# Patient Record
Sex: FEMALE | Race: WHITE | Employment: FULL TIME | URBAN - METROPOLITAN AREA
[De-identification: names, ages, dates, MRNs, and addresses within clinical notes are randomized per-mention and may not be internally consistent; named-entity substitution may affect disease eponyms.]

---

## 2017-07-24 ENCOUNTER — HOSPITAL ENCOUNTER (OUTPATIENT)
Dept: MAMMOGRAPHY | Age: 57
Discharge: HOME | End: 2017-07-24

## 2017-07-24 DIAGNOSIS — R92.8 ABNORMAL MAMMOGRAM: ICD-10-CM

## 2017-07-24 DIAGNOSIS — Z12.31 VISIT FOR SCREENING MAMMOGRAM: ICD-10-CM

## 2018-11-16 ENCOUNTER — HOSPITAL ENCOUNTER (OUTPATIENT)
Dept: MAMMOGRAPHY | Age: 58
Discharge: HOME | End: 2018-11-16
Payer: COMMERCIAL

## 2018-11-16 ENCOUNTER — HOSPITAL ENCOUNTER (OUTPATIENT)
Dept: ULTRASOUND IMAGING | Age: 58
Discharge: HOME | End: 2018-11-16
Payer: COMMERCIAL

## 2018-11-16 DIAGNOSIS — Z12.31 VISIT FOR SCREENING MAMMOGRAM: ICD-10-CM

## 2018-11-16 DIAGNOSIS — R92.8 ABNORMAL MAMMOGRAM: ICD-10-CM

## 2018-11-16 PROCEDURE — 77067 SCR MAMMO BI INCL CAD: CPT

## 2018-11-16 PROCEDURE — 76642 ULTRASOUND BREAST LIMITED: CPT

## 2018-11-19 ENCOUNTER — PRE-PROCEDURE TELEPHONE (OUTPATIENT)
Dept: WOMENS IMAGING | Age: 58
End: 2018-11-19

## 2018-11-19 RX ORDER — LORAZEPAM 1 MG/1
1 TABLET ORAL
COMMUNITY
Start: 2018-10-02 | End: 2018-12-31

## 2018-11-19 RX ORDER — ZOLPIDEM TARTRATE 12.5 MG/1
TABLET, FILM COATED, EXTENDED RELEASE ORAL
COMMUNITY
Start: 2018-09-21 | End: 2018-12-20

## 2018-11-19 RX ORDER — DEXLANSOPRAZOLE 60 MG/1
60 CAPSULE, DELAYED RELEASE ORAL
COMMUNITY
End: 2022-02-25

## 2018-11-19 RX ORDER — CIPROFLOXACIN 250 MG/1
250 TABLET, FILM COATED ORAL
COMMUNITY
Start: 2018-11-05 | End: 2019-08-06 | Stop reason: ALTCHOICE

## 2018-11-19 RX ORDER — IRBESARTAN 300 MG/1
300 TABLET ORAL
COMMUNITY
End: 2021-05-14

## 2018-12-04 ENCOUNTER — HOSPITAL ENCOUNTER (OUTPATIENT)
Dept: WOMENS IMAGING | Age: 58
Discharge: HOME | End: 2018-12-04
Payer: COMMERCIAL

## 2018-12-04 DIAGNOSIS — R92.8 ABNORMAL MAMMOGRAM: ICD-10-CM

## 2018-12-04 PROCEDURE — G0279 TOMOSYNTHESIS, MAMMO: HCPCS

## 2019-06-06 ENCOUNTER — HOSPITAL ENCOUNTER (OUTPATIENT)
Dept: WOMENS IMAGING | Age: 59
Discharge: HOME | End: 2019-06-06
Payer: COMMERCIAL

## 2019-06-06 DIAGNOSIS — R92.8 ABNORMAL MAMMOGRAM: ICD-10-CM

## 2019-06-06 PROCEDURE — G0279 TOMOSYNTHESIS, MAMMO: HCPCS

## 2019-07-02 ENCOUNTER — HOSPITAL ENCOUNTER (OUTPATIENT)
Age: 59
Setting detail: OUTPATIENT SURGERY
Discharge: HOME | End: 2019-07-02
Attending: INTERNAL MEDICINE | Admitting: INTERNAL MEDICINE
Payer: COMMERCIAL

## 2019-07-02 ENCOUNTER — ANESTHESIA EVENT (OUTPATIENT)
Dept: ENDOSCOPY | Age: 59
End: 2019-07-02
Payer: COMMERCIAL

## 2019-07-02 ENCOUNTER — ANESTHESIA (OUTPATIENT)
Dept: ENDOSCOPY | Age: 59
End: 2019-07-02
Payer: COMMERCIAL

## 2019-07-02 VITALS — SYSTOLIC BLOOD PRESSURE: 112 MMHG | DIASTOLIC BLOOD PRESSURE: 75 MMHG | OXYGEN SATURATION: 94 %

## 2019-07-02 VITALS
HEART RATE: 45 BPM | DIASTOLIC BLOOD PRESSURE: 74 MMHG | HEIGHT: 70 IN | WEIGHT: 200 LBS | OXYGEN SATURATION: 96 % | BODY MASS INDEX: 28.63 KG/M2 | RESPIRATION RATE: 20 BRPM | TEMPERATURE: 97.3 F | SYSTOLIC BLOOD PRESSURE: 141 MMHG

## 2019-07-02 PROCEDURE — 7100000010 HC PHASE II RECOVERY - FIRST 15 MIN: Performed by: INTERNAL MEDICINE

## 2019-07-02 PROCEDURE — 3609010300 HC COLONOSCOPY W/BIOPSY SINGLE/MULTIPLE: Performed by: INTERNAL MEDICINE

## 2019-07-02 PROCEDURE — 2709999900 HC NON-CHARGEABLE SUPPLY: Performed by: INTERNAL MEDICINE

## 2019-07-02 PROCEDURE — 7100000011 HC PHASE II RECOVERY - ADDTL 15 MIN: Performed by: INTERNAL MEDICINE

## 2019-07-02 PROCEDURE — 3609010600 HC COLONOSCOPY POLYPECTOMY SNARE/COLD BIOPSY: Performed by: INTERNAL MEDICINE

## 2019-07-02 PROCEDURE — 88305 TISSUE EXAM BY PATHOLOGIST: CPT

## 2019-07-02 PROCEDURE — 3700000001 HC ADD 15 MINUTES (ANESTHESIA): Performed by: INTERNAL MEDICINE

## 2019-07-02 PROCEDURE — 3700000000 HC ANESTHESIA ATTENDED CARE: Performed by: INTERNAL MEDICINE

## 2019-07-02 PROCEDURE — 6370000000 HC RX 637 (ALT 250 FOR IP): Performed by: ANESTHESIOLOGY

## 2019-07-02 PROCEDURE — 2500000003 HC RX 250 WO HCPCS: Performed by: NURSE ANESTHETIST, CERTIFIED REGISTERED

## 2019-07-02 PROCEDURE — 2580000003 HC RX 258: Performed by: NURSE ANESTHETIST, CERTIFIED REGISTERED

## 2019-07-02 PROCEDURE — 6360000002 HC RX W HCPCS: Performed by: NURSE ANESTHETIST, CERTIFIED REGISTERED

## 2019-07-02 RX ORDER — LIDOCAINE HYDROCHLORIDE 20 MG/ML
INJECTION, SOLUTION EPIDURAL; INFILTRATION; INTRACAUDAL; PERINEURAL PRN
Status: DISCONTINUED | OUTPATIENT
Start: 2019-07-02 | End: 2019-07-02 | Stop reason: SDUPTHER

## 2019-07-02 RX ORDER — SODIUM CHLORIDE, SODIUM LACTATE, POTASSIUM CHLORIDE, CALCIUM CHLORIDE 600; 310; 30; 20 MG/100ML; MG/100ML; MG/100ML; MG/100ML
INJECTION, SOLUTION INTRAVENOUS CONTINUOUS PRN
Status: DISCONTINUED | OUTPATIENT
Start: 2019-07-02 | End: 2019-07-02 | Stop reason: SDUPTHER

## 2019-07-02 RX ORDER — ZOLPIDEM TARTRATE 10 MG/1
12.5 TABLET ORAL NIGHTLY PRN
COMMUNITY
End: 2021-05-14 | Stop reason: CLARIF

## 2019-07-02 RX ORDER — PROPOFOL 10 MG/ML
INJECTION, EMULSION INTRAVENOUS PRN
Status: DISCONTINUED | OUTPATIENT
Start: 2019-07-02 | End: 2019-07-02 | Stop reason: SDUPTHER

## 2019-07-02 RX ORDER — ALPRAZOLAM 1 MG/1
1 TABLET ORAL NIGHTLY PRN
COMMUNITY
End: 2021-05-14 | Stop reason: ALTCHOICE

## 2019-07-02 RX ORDER — LORAZEPAM 1 MG/1
2 TABLET ORAL NIGHTLY
COMMUNITY

## 2019-07-02 RX ORDER — SCOLOPAMINE TRANSDERMAL SYSTEM 1 MG/1
1 PATCH, EXTENDED RELEASE TRANSDERMAL ONCE
Status: DISCONTINUED | OUTPATIENT
Start: 2019-07-02 | End: 2019-07-02 | Stop reason: HOSPADM

## 2019-07-02 RX ADMIN — PROPOFOL 25 MG: 10 INJECTION, EMULSION INTRAVENOUS at 08:55

## 2019-07-02 RX ADMIN — PROPOFOL 75 MG: 10 INJECTION, EMULSION INTRAVENOUS at 08:29

## 2019-07-02 RX ADMIN — PROPOFOL 20 MG: 10 INJECTION, EMULSION INTRAVENOUS at 09:02

## 2019-07-02 RX ADMIN — PROPOFOL 25 MG: 10 INJECTION, EMULSION INTRAVENOUS at 08:58

## 2019-07-02 RX ADMIN — PROPOFOL 25 MG: 10 INJECTION, EMULSION INTRAVENOUS at 08:46

## 2019-07-02 RX ADMIN — PROPOFOL 75 MG: 10 INJECTION, EMULSION INTRAVENOUS at 08:30

## 2019-07-02 RX ADMIN — PROPOFOL 50 MG: 10 INJECTION, EMULSION INTRAVENOUS at 08:28

## 2019-07-02 RX ADMIN — SODIUM CHLORIDE, SODIUM LACTATE, POTASSIUM CHLORIDE, AND CALCIUM CHLORIDE: 600; 310; 30; 20 INJECTION, SOLUTION INTRAVENOUS at 08:23

## 2019-07-02 RX ADMIN — PROPOFOL 50 MG: 10 INJECTION, EMULSION INTRAVENOUS at 08:37

## 2019-07-02 RX ADMIN — PROPOFOL 100 MG: 10 INJECTION, EMULSION INTRAVENOUS at 08:32

## 2019-07-02 RX ADMIN — PROPOFOL 50 MG: 10 INJECTION, EMULSION INTRAVENOUS at 08:40

## 2019-07-02 RX ADMIN — PROPOFOL 10 MG: 10 INJECTION, EMULSION INTRAVENOUS at 09:00

## 2019-07-02 RX ADMIN — PROPOFOL 25 MG: 10 INJECTION, EMULSION INTRAVENOUS at 08:50

## 2019-07-02 RX ADMIN — PROPOFOL 25 MG: 10 INJECTION, EMULSION INTRAVENOUS at 08:42

## 2019-07-02 RX ADMIN — PROPOFOL 50 MG: 10 INJECTION, EMULSION INTRAVENOUS at 08:34

## 2019-07-02 RX ADMIN — LIDOCAINE HYDROCHLORIDE 60 MG: 20 INJECTION, SOLUTION EPIDURAL; INFILTRATION; INTRACAUDAL; PERINEURAL at 08:28

## 2019-07-02 RX ADMIN — PROPOFOL 25 MG: 10 INJECTION, EMULSION INTRAVENOUS at 08:52

## 2019-07-02 RX ADMIN — PROPOFOL 50 MG: 10 INJECTION, EMULSION INTRAVENOUS at 08:35

## 2019-07-02 ASSESSMENT — PULMONARY FUNCTION TESTS
PIF_VALUE: 0
PIF_VALUE: 1
PIF_VALUE: 0

## 2019-07-02 ASSESSMENT — PAIN DESCRIPTION - LOCATION: LOCATION: ABDOMEN

## 2019-07-02 ASSESSMENT — PAIN - FUNCTIONAL ASSESSMENT
PAIN_FUNCTIONAL_ASSESSMENT: 0-10
PAIN_FUNCTIONAL_ASSESSMENT: FACES
PAIN_FUNCTIONAL_ASSESSMENT: 0-10
PAIN_FUNCTIONAL_ASSESSMENT: 0-10

## 2019-07-02 ASSESSMENT — PAIN SCALES - GENERAL: PAINLEVEL_OUTOF10: 3

## 2019-07-02 ASSESSMENT — PAIN DESCRIPTION - DESCRIPTORS: DESCRIPTORS: PRESSURE

## 2019-07-02 ASSESSMENT — PAIN DESCRIPTION - ORIENTATION: ORIENTATION: UPPER

## 2019-07-02 NOTE — H&P
[address] [place] [address]   Pre-operative History and Physical    Patient: Aby Kinney  : 1960     History Obtained From:  patient, electronic medical record    HISTORY OF PRESENT ILLNESS:    The patient is a 60 y.o. female who presents for a colonoscopy for screening for cancer/surveillance of polyps. Past Medical History:    Cervical dystonia  IBS  Anxiety  GERD  Rosacea  Anemia  GERD  Fatt liver  EPI  H/o polyp  Past Surgical History:    Breast surgery  Medications Prior to Admission:   No current facility-administered medications on file prior to encounter. Current Outpatient Medications on File Prior to Encounter   Medication Sig Dispense Refill    Evolocumab (REPATHA SC) Inject into the skin      LORazepam (ATIVAN) 1 MG tablet Take 1 mg by mouth every 6 hours as needed for Anxiety.  zolpidem (AMBIEN) 10 MG tablet Take 12.5 mg by mouth nightly as needed for Sleep.  ALPRAZolam (XANAX) 1 MG tablet Take 1 mg by mouth nightly as needed for Sleep.  dexlansoprazole (DEXILANT) 60 MG CPDR delayed release capsule Take 60 mg by mouth      irbesartan (AVAPRO) 300 MG tablet Take 300 mg by mouth      ciprofloxacin (CIPRO) 250 MG tablet Take 250 mg by mouth       Allergies:  Atorvastatin; Iodine; Pravastatin; Rosuvastatin; Shellfish allergy; and Sulfa antibiotics    History of allergic reaction to anesthesia:  No    Social History:   TOBACCO:   has no tobacco history on file. ETOH:  +EtOH  DRUGS:   has no drug history on file. Family History:   History reviewed. No pertinent family history.     PHYSICAL EXAM:      BP (!) 121/96   Pulse 72   Temp 97.3 °F (36.3 °C)   Resp 16   Ht 5' 10\" (1.778 m)   Wt 200 lb (90.7 kg)   SpO2 97%   BMI 28.70 kg/m²  I        Heart:  No m/r/g +s1/s2 RRR    Lungs:  CTA bilaterally    Abdomen:  Soft, nontender, non distended; +bs    ASA Grade:  ASA 2 - Patient with mild systemic disease with no functional limitations    Mallampati Class:  Class

## 2019-07-02 NOTE — ANESTHESIA PRE PROCEDURE
Department of Anesthesiology  Preprocedure Note       Name:  Vivian Sherman   Age:  60 y.o.  :  1960                                          MRN:  [id number]         Date:  2019      Surgeon: Talita Leach):  Raul Shin MD    Procedure: COLONOSCOPY WITH MAC (N/A )    Medications prior to admission:   Prior to Admission medications    Medication Sig Start Date End Date Taking? Authorizing Provider   dexlansoprazole (DEXILANT) 60 MG CPDR delayed release capsule Take 60 mg by mouth    Historical Provider, MD   irbesartan (AVAPRO) 300 MG tablet Take 300 mg by mouth    Historical Provider, MD   ciprofloxacin (CIPRO) 250 MG tablet Take 250 mg by mouth 18   Historical Provider, MD       Current medications:    Current Facility-Administered Medications   Medication Dose Route Frequency Provider Last Rate Last Dose    scopolamine (TRANSDERM-SCOP) transdermal patch 1 patch  1 patch Transdermal Once Harriett Toth MD           Allergies: Allergies   Allergen Reactions    Atorvastatin     Iodine Swelling    Pravastatin     Rosuvastatin     Shellfish Allergy Itching    Sulfa Antibiotics Nausea Only       Problem List:  There is no problem list on file for this patient. Past Medical History:  History reviewed. No pertinent past medical history. Past Surgical History:  History reviewed. No pertinent surgical history.     Social History:    Social History     Tobacco Use    Smoking status: Not on file   Substance Use Topics    Alcohol use: Not on file                                Counseling given: Not Answered      Vital Signs (Current):   Vitals:    19 0718   BP: (!) 121/96   Pulse: 72   Resp: 16   Temp: 97.3 °F (36.3 °C)   SpO2: 97%   Weight: 200 lb (90.7 kg)   Height: 5' 10\" (1.778 m)                                              BP Readings from Last 3 Encounters:   19 (!) 121/96       NPO Status:

## 2019-07-02 NOTE — PROCEDURES
orifice the ileocecal valve and other normal anatomy and a picture was obtained. The scope was then withdrawn (>6minutes) with close inspection of the mucosa in a circumferential manor. TI normal.    Retroflexed views under the ICV and of right colon normal.     8 polyps 1 to 11mm removed with forceps and cold snare from throughout the colon. Mild diverticulosis throughout the colon. Retroflexion showed hemorrhoids. No immediate complications. The preparation was good. Estimated blood loss none    Impression:  8 polyps  Mild diverticulosis  hemorrhoids  Plan:  The patient is aware it is their responsibility to call for biopsy results in 7 days. Repeat colonoscopy in 2 years.   High fiber diet

## 2019-07-23 ENCOUNTER — FOLLOWUP TELEPHONE ENCOUNTER (OUTPATIENT)
Dept: WOMENS IMAGING | Age: 59
End: 2019-07-23

## 2019-07-24 ENCOUNTER — CLINICAL DOCUMENTATION (OUTPATIENT)
Dept: INTERVENTIONAL RADIOLOGY/VASCULAR | Age: 59
End: 2019-07-24

## 2019-07-24 NOTE — PROGRESS NOTES
Spoke to patient to schedule Liver biopsy requested with Dr. Uma Alfaro.  Patient scheduled for Aug 6th at 9 am.

## 2019-08-01 ENCOUNTER — FOLLOWUP TELEPHONE ENCOUNTER (OUTPATIENT)
Dept: WOMENS IMAGING | Age: 59
End: 2019-08-01

## 2019-08-06 ENCOUNTER — ANESTHESIA EVENT (OUTPATIENT)
Dept: CT IMAGING | Age: 59
End: 2019-08-06

## 2019-08-06 ENCOUNTER — HOSPITAL ENCOUNTER (OUTPATIENT)
Dept: CT IMAGING | Age: 59
Discharge: HOME | End: 2019-08-06
Payer: COMMERCIAL

## 2019-08-06 ENCOUNTER — ANESTHESIA (OUTPATIENT)
Dept: CT IMAGING | Age: 59
End: 2019-08-06

## 2019-08-06 VITALS
TEMPERATURE: 97.4 F | SYSTOLIC BLOOD PRESSURE: 159 MMHG | DIASTOLIC BLOOD PRESSURE: 84 MMHG | HEART RATE: 52 BPM | RESPIRATION RATE: 16 BRPM | OXYGEN SATURATION: 96 % | WEIGHT: 210 LBS | HEIGHT: 70 IN | BODY MASS INDEX: 30.06 KG/M2

## 2019-08-06 DIAGNOSIS — K76.0 FATTY METAMORPHOSIS OF LIVER: ICD-10-CM

## 2019-08-06 LAB
BASOPHILS ABSOLUTE: 0.1 K/UL (ref 0–0.2)
BASOPHILS RELATIVE PERCENT: 1.2 %
EOSINOPHILS ABSOLUTE: 0.1 K/UL (ref 0–0.6)
EOSINOPHILS RELATIVE PERCENT: 1.2 %
GLUCOSE BLD-MCNC: 86 MG/DL (ref 70–99)
HCT VFR BLD CALC: 36.8 % (ref 36–48)
HEMOGLOBIN: 12.1 G/DL (ref 12–16)
INR BLD: 1.03 (ref 0.86–1.14)
LYMPHOCYTES ABSOLUTE: 1.4 K/UL (ref 1–5.1)
LYMPHOCYTES RELATIVE PERCENT: 31.5 %
MCH RBC QN AUTO: 29.4 PG (ref 26–34)
MCHC RBC AUTO-ENTMCNC: 33 G/DL (ref 31–36)
MCV RBC AUTO: 89 FL (ref 80–100)
MONOCYTES ABSOLUTE: 0.4 K/UL (ref 0–1.3)
MONOCYTES RELATIVE PERCENT: 9.5 %
NEUTROPHILS ABSOLUTE: 2.4 K/UL (ref 1.7–7.7)
NEUTROPHILS RELATIVE PERCENT: 56.6 %
PDW BLD-RTO: 15.7 % (ref 12.4–15.4)
PERFORMED ON: NORMAL
PLATELET # BLD: 166 K/UL (ref 135–450)
PMV BLD AUTO: 8.6 FL (ref 5–10.5)
PROTHROMBIN TIME: 11.7 SEC (ref 9.8–13)
RBC # BLD: 4.13 M/UL (ref 4–5.2)
WBC # BLD: 4.3 K/UL (ref 4–11)

## 2019-08-06 PROCEDURE — 7100000000 HC PACU RECOVERY - FIRST 15 MIN

## 2019-08-06 PROCEDURE — 2709999900 CT NEEDLE BIOPSY LIVER PERCUTANEOUS

## 2019-08-06 PROCEDURE — 7100000011 HC PHASE II RECOVERY - ADDTL 15 MIN

## 2019-08-06 PROCEDURE — 88307 TISSUE EXAM BY PATHOLOGIST: CPT

## 2019-08-06 PROCEDURE — 6360000002 HC RX W HCPCS: Performed by: ANESTHESIOLOGY

## 2019-08-06 PROCEDURE — 88313 SPECIAL STAINS GROUP 2: CPT

## 2019-08-06 PROCEDURE — 7100000010 HC PHASE II RECOVERY - FIRST 15 MIN

## 2019-08-06 PROCEDURE — 85025 COMPLETE CBC W/AUTO DIFF WBC: CPT

## 2019-08-06 PROCEDURE — 85610 PROTHROMBIN TIME: CPT

## 2019-08-06 PROCEDURE — 7100000001 HC PACU RECOVERY - ADDTL 15 MIN

## 2019-08-06 PROCEDURE — 36415 COLL VENOUS BLD VENIPUNCTURE: CPT

## 2019-08-06 RX ORDER — FENTANYL CITRATE 50 UG/ML
50 INJECTION, SOLUTION INTRAMUSCULAR; INTRAVENOUS EVERY 5 MIN PRN
Status: DISCONTINUED | OUTPATIENT
Start: 2019-08-06 | End: 2019-08-07 | Stop reason: HOSPADM

## 2019-08-06 RX ORDER — ONDANSETRON 2 MG/ML
4 INJECTION INTRAMUSCULAR; INTRAVENOUS EVERY 8 HOURS PRN
Status: DISCONTINUED | OUTPATIENT
Start: 2019-08-06 | End: 2019-08-07 | Stop reason: HOSPADM

## 2019-08-06 RX ORDER — PROMETHAZINE HYDROCHLORIDE 25 MG/ML
6.25 INJECTION, SOLUTION INTRAMUSCULAR; INTRAVENOUS
Status: ACTIVE | OUTPATIENT
Start: 2019-08-06 | End: 2019-08-06

## 2019-08-06 RX ORDER — ONDANSETRON 2 MG/ML
4 INJECTION INTRAMUSCULAR; INTRAVENOUS
Status: COMPLETED | OUTPATIENT
Start: 2019-08-06 | End: 2019-08-06

## 2019-08-06 RX ORDER — SODIUM CHLORIDE, SODIUM LACTATE, POTASSIUM CHLORIDE, CALCIUM CHLORIDE 600; 310; 30; 20 MG/100ML; MG/100ML; MG/100ML; MG/100ML
INJECTION, SOLUTION INTRAVENOUS CONTINUOUS
Status: DISCONTINUED | OUTPATIENT
Start: 2019-08-06 | End: 2019-08-07 | Stop reason: HOSPADM

## 2019-08-06 RX ORDER — FENTANYL CITRATE 50 UG/ML
25 INJECTION, SOLUTION INTRAMUSCULAR; INTRAVENOUS EVERY 5 MIN PRN
Status: DISCONTINUED | OUTPATIENT
Start: 2019-08-06 | End: 2019-08-07 | Stop reason: HOSPADM

## 2019-08-06 RX ADMIN — HYDROMORPHONE HYDROCHLORIDE 0.25 MG: 1 INJECTION, SOLUTION INTRAMUSCULAR; INTRAVENOUS; SUBCUTANEOUS at 12:18

## 2019-08-06 RX ADMIN — ONDANSETRON 4 MG: 2 INJECTION INTRAMUSCULAR; INTRAVENOUS at 14:02

## 2019-08-06 RX ADMIN — HYDROMORPHONE HYDROCHLORIDE 0.25 MG: 1 INJECTION, SOLUTION INTRAMUSCULAR; INTRAVENOUS; SUBCUTANEOUS at 12:08

## 2019-08-06 RX ADMIN — HYDROMORPHONE HYDROCHLORIDE 0.5 MG: 1 INJECTION, SOLUTION INTRAMUSCULAR; INTRAVENOUS; SUBCUTANEOUS at 12:46

## 2019-08-06 ASSESSMENT — PAIN SCALES - GENERAL
PAINLEVEL_OUTOF10: 0
PAINLEVEL_OUTOF10: 2
PAINLEVEL_OUTOF10: 4
PAINLEVEL_OUTOF10: 7
PAINLEVEL_OUTOF10: 4
PAINLEVEL_OUTOF10: 2

## 2019-08-06 ASSESSMENT — PAIN - FUNCTIONAL ASSESSMENT
PAIN_FUNCTIONAL_ASSESSMENT: 0-10
PAIN_FUNCTIONAL_ASSESSMENT: 0-10

## 2019-08-06 ASSESSMENT — PAIN DESCRIPTION - DESCRIPTORS: DESCRIPTORS: SORE

## 2019-08-08 NOTE — ANESTHESIA POSTPROCEDURE EVALUATION
Department of Anesthesiology  Postprocedure Note    Patient: Melina Arreaga  MRN: [id number]  YOB: 1960  Date of evaluation: 8/8/2019  Time:  9:09 AM     Procedure Summary     Date:  08/06/19 Room / Location:  [facility] CT Scan; [facility] Radiology    Anesthesia Start:  21  Anesthesia Stop:  1140    Procedure:  CT NEEDLE BIOPSY LIVER PERCUTANEOUS Diagnosis:  Fatty metamorphosis of liver    Scheduled Providers:  [address] [place] [address] Radiologist Responsible Provider:      Anesthesia Type:  MAC ASA Status:  3          Anesthesia Type: MAC    Matthieu Phase I: Matthieu Score: 9    Matthieu Phase II: Matthieu Score: 10    Last vitals: Reviewed and per EMR flowsheets.        Anesthesia Post Evaluation    Patient location during evaluation: PACU  Patient participation: complete - patient participated  Level of consciousness: awake and alert  Airway patency: patent  Nausea & Vomiting: no nausea and no vomiting  Complications: no  Cardiovascular status: blood pressure returned to baseline  Respiratory status: acceptable  Hydration status: stable

## 2019-10-10 ENCOUNTER — APPOINTMENT (RX ONLY)
Dept: URBAN - METROPOLITAN AREA CLINIC 170 | Facility: CLINIC | Age: 59
Setting detail: DERMATOLOGY
End: 2019-10-10

## 2019-10-10 DIAGNOSIS — L71.8 OTHER ROSACEA: ICD-10-CM

## 2019-10-10 DIAGNOSIS — L82.1 OTHER SEBORRHEIC KERATOSIS: ICD-10-CM

## 2019-10-10 DIAGNOSIS — L81.4 OTHER MELANIN HYPERPIGMENTATION: ICD-10-CM

## 2019-10-10 DIAGNOSIS — L73.8 OTHER SPECIFIED FOLLICULAR DISORDERS: ICD-10-CM

## 2019-10-10 DIAGNOSIS — T78.40 ALLERGY, UNSPECIFIED: ICD-10-CM

## 2019-10-10 DIAGNOSIS — D22 MELANOCYTIC NEVI: ICD-10-CM

## 2019-10-10 DIAGNOSIS — L64.8 OTHER ANDROGENIC ALOPECIA: ICD-10-CM

## 2019-10-10 DIAGNOSIS — D18.0 HEMANGIOMA: ICD-10-CM

## 2019-10-10 DIAGNOSIS — L57.0 ACTINIC KERATOSIS: ICD-10-CM

## 2019-10-10 PROBLEM — D18.01 HEMANGIOMA OF SKIN AND SUBCUTANEOUS TISSUE: Status: ACTIVE | Noted: 2019-10-10

## 2019-10-10 PROBLEM — D22.5 MELANOCYTIC NEVI OF TRUNK: Status: ACTIVE | Noted: 2019-10-10

## 2019-10-10 PROBLEM — D48.5 NEOPLASM OF UNCERTAIN BEHAVIOR OF SKIN: Status: ACTIVE | Noted: 2019-10-10

## 2019-10-10 PROBLEM — T78.40XA ALLERGY, UNSPECIFIED, INITIAL ENCOUNTER: Status: ACTIVE | Noted: 2019-10-10

## 2019-10-10 PROBLEM — D89.9 DISORDER INVOLVING THE IMMUNE MECHANISM, UNSPECIFIED: Status: ACTIVE | Noted: 2019-10-10

## 2019-10-10 PROCEDURE — ? FULL BODY SKIN EXAM

## 2019-10-10 PROCEDURE — ? BIOPSY BY SHAVE METHOD

## 2019-10-10 PROCEDURE — 11103 TANGNTL BX SKIN EA SEP/ADDL: CPT

## 2019-10-10 PROCEDURE — ? COUNSELING

## 2019-10-10 PROCEDURE — ? PRESCRIPTION MEDICATION MANAGEMENT

## 2019-10-10 PROCEDURE — ? ORDER TESTS

## 2019-10-10 PROCEDURE — ? PRESCRIPTION SAMPLES PROVIDED

## 2019-10-10 PROCEDURE — ? ADDITIONAL NOTES

## 2019-10-10 PROCEDURE — 11102 TANGNTL BX SKIN SINGLE LES: CPT

## 2019-10-10 PROCEDURE — 99203 OFFICE O/P NEW LOW 30 MIN: CPT | Mod: 25

## 2019-10-10 PROCEDURE — ? PRESCRIPTION

## 2019-10-10 PROCEDURE — ? EDUCATIONAL RESOURCES PROVIDED

## 2019-10-10 RX ORDER — FLUOROURACIL 5 MG/G
CREAM TOPICAL
Qty: 1 | Refills: 2 | Status: ERX | COMMUNITY
Start: 2019-10-10

## 2019-10-10 RX ADMIN — FLUOROURACIL: 5 CREAM TOPICAL at 00:00

## 2019-10-10 ASSESSMENT — LOCATION SIMPLE DESCRIPTION DERM
LOCATION SIMPLE: CHEST
LOCATION SIMPLE: LEFT TEMPLE
LOCATION SIMPLE: LEFT CHEEK
LOCATION SIMPLE: LEFT ZYGOMA
LOCATION SIMPLE: RIGHT TEMPLE
LOCATION SIMPLE: RIGHT CHEEK
LOCATION SIMPLE: LEFT BREAST
LOCATION SIMPLE: LEFT LIP

## 2019-10-10 ASSESSMENT — LOCATION DETAILED DESCRIPTION DERM
LOCATION DETAILED: LEFT MEDIAL BREAST 10-11:00 REGION
LOCATION DETAILED: RIGHT INFERIOR LATERAL MALAR CHEEK
LOCATION DETAILED: LEFT INFERIOR MEDIAL MALAR CHEEK
LOCATION DETAILED: LEFT INFERIOR VERMILION LIP
LOCATION DETAILED: LEFT LATERAL BUCCAL CHEEK
LOCATION DETAILED: LEFT CENTRAL ZYGOMA
LOCATION DETAILED: LEFT MID TEMPLE
LOCATION DETAILED: RIGHT LATERAL SUPERIOR CHEST
LOCATION DETAILED: STERNAL NOTCH
LOCATION DETAILED: RIGHT CENTRAL TEMPLE
LOCATION DETAILED: MIDDLE STERNUM

## 2019-10-10 ASSESSMENT — LOCATION ZONE DERM
LOCATION ZONE: FACE
LOCATION ZONE: FACE
LOCATION ZONE: TRUNK
LOCATION ZONE: LIP

## 2019-10-10 NOTE — PROCEDURE: MIPS QUALITY
Detail Level: Detailed
Quality 226: Preventive Care And Screening: Tobacco Use: Screening And Cessation Intervention: Patient screened for tobacco use and is an ex/non-smoker
Quality 431: Preventive Care And Screening: Unhealthy Alcohol Use - Screening: Patient screened for unhealthy alcohol use using a single question and scores 2 or greater episodes per year and brief intervention did not occur
Quality 110: Preventive Care And Screening: Influenza Immunization: Influenza immunization was not ordered or administered, reason not given
Quality 130: Documentation Of Current Medications In The Medical Record: Current Medications Documented

## 2019-10-10 NOTE — PROCEDURE: ADDITIONAL NOTES
Additional Notes: Hairloss questionairre given to patient. Will bring back at next appointment.
Detail Level: Simple

## 2019-10-10 NOTE — PROCEDURE: PRESCRIPTION MEDICATION MANAGEMENT
Otc Regimen: Zyrtec, claritin, or allegra. If continues then  follow up with allergist
Render In Strict Bullet Format?: No
Detail Level: Zone

## 2019-10-10 NOTE — HPI: EVALUATION OF SKIN LESION(S)
What Type Of Note Output Would You Prefer (Optional)?: Bullet Format
Hpi Title: Evaluation of Skin Lesions
How Severe Are Your Spot(S)?: mild
Have Your Spot(S) Been Treated In The Past?: has not been treated
Family Member: Sister
Additional History: Left cheek yellow indent spot.  Multiple Small yellow bumps on face. Pimple type growth on left lower eyelid.

## 2019-10-10 NOTE — PROCEDURE: BIOPSY BY SHAVE METHOD
Electrodesiccation Text: The wound bed was treated with electrodesiccation after the biopsy was performed.
X Size Of Lesion In Cm: 0.9
Biopsy Type: H and E
Wound Care: Aquaphor
Consent: Written consent was obtained and risks were reviewed including but not limited to scarring, infection, bleeding, scabbing, incomplete removal, nerve damage and allergy to anesthesia.
Bill For Surgical Tray: no
Depth Of Biopsy: dermis
Notification Instructions: Patient will be notified of biopsy results. However, patient instructed to call the office if not contacted within 2 weeks.
Dressing: Band-Aid
Type Of Destruction Used: Curettage
Anesthesia Volume In Cc (Will Not Render If 0): 2
Electrodesiccation And Curettage Text: The wound bed was treated with electrodesiccation and curettage after the biopsy was performed.
Detail Level: Detailed
Biopsy Method: double edge Personna blade
Hemostasis: Aluminum Chloride
Curettage Text: The wound bed was treated with curettage after the biopsy was performed.
Billing Type: Third-Party Bill
Silver Nitrate Text: The wound bed was treated with silver nitrate after the biopsy was performed.
Was A Bandage Applied: Yes
Anticipated Plan (Based On Presumed Biopsy Results): Call either way
Anesthesia Type: 1% Xylocaine with epinephrine
Cryotherapy Text: The wound bed was treated with cryotherapy after the biopsy was performed.
Additional Anesthesia Volume In Cc (Will Not Render If 0): 0
Size Of Lesion In Cm: 0.6
Lab Facility: [id number]
Lab: -[id number]
Additional Anticipated Plans: If malignant consider Mohs surgery
Post-Care Instructions: I reviewed with the patient in detail post-care instructions. Patient is to keep the biopsy site dry overnight, and then apply bacitracin twice daily until healed. Patient may apply hydrogen peroxide soaks to remove any crusting.
X Size Of Lesion In Cm: 0.7
Additional Anticipated Plans: If malignant consider excision
Size Of Lesion In Cm: 1.9

## 2019-10-10 NOTE — HPI: OTHER
Condition:: Lip swelling
Please Describe Your Condition:: After eating popcorn. Used hydrocortisone and aquaphor and took benadryl 3 times and it helped some.

## 2019-12-03 ENCOUNTER — HOSPITAL ENCOUNTER (OUTPATIENT)
Dept: WOMENS IMAGING | Age: 59
Discharge: HOME | End: 2019-12-03
Payer: COMMERCIAL

## 2019-12-03 DIAGNOSIS — Z12.31 BREAST CANCER SCREENING BY MAMMOGRAM: ICD-10-CM

## 2019-12-03 PROCEDURE — 77063 BREAST TOMOSYNTHESIS BI: CPT

## 2019-12-10 ENCOUNTER — RX ONLY (OUTPATIENT)
Age: 59
Setting detail: RX ONLY
End: 2019-12-10

## 2019-12-10 RX ORDER — IVERMECTIN 10 MG/G
CREAM TOPICAL
Qty: 1 | Refills: 3 | Status: ERX | COMMUNITY
Start: 2019-12-10

## 2019-12-10 RX ORDER — OXYMETAZOLINE HYDROCHLORIDE 10 MG/G
CREAM TOPICAL
Qty: 1 | Refills: 3 | Status: ERX | COMMUNITY
Start: 2019-12-10

## 2020-03-16 ENCOUNTER — APPOINTMENT (RX ONLY)
Dept: URBAN - METROPOLITAN AREA CLINIC 170 | Facility: CLINIC | Age: 60
Setting detail: DERMATOLOGY
End: 2020-03-16

## 2020-03-16 DIAGNOSIS — Z85.828 PERSONAL HISTORY OF OTHER MALIGNANT NEOPLASM OF SKIN: ICD-10-CM

## 2020-03-16 DIAGNOSIS — L57.0 ACTINIC KERATOSIS: ICD-10-CM

## 2020-03-16 PROCEDURE — ? PRESCRIPTION

## 2020-03-16 PROCEDURE — 99213 OFFICE O/P EST LOW 20 MIN: CPT

## 2020-03-16 PROCEDURE — ? PRESCRIPTION MEDICATION MANAGEMENT

## 2020-03-16 PROCEDURE — ? COUNSELING

## 2020-03-16 RX ORDER — LIDOCAINE AND PRILOCAINE 25; 25 MG/G; MG/G
CREAM TOPICAL
Qty: 1 | Refills: 1 | Status: ERX | COMMUNITY
Start: 2020-03-16

## 2020-03-16 RX ADMIN — LIDOCAINE AND PRILOCAINE: 25; 25 CREAM TOPICAL at 00:00

## 2020-03-16 ASSESSMENT — LOCATION SIMPLE DESCRIPTION DERM: LOCATION SIMPLE: LEFT CHEEK

## 2020-03-16 ASSESSMENT — LOCATION DETAILED DESCRIPTION DERM: LOCATION DETAILED: LEFT INFERIOR MEDIAL MALAR CHEEK

## 2020-03-16 ASSESSMENT — LOCATION ZONE DERM: LOCATION ZONE: FACE

## 2020-03-16 NOTE — PROCEDURE: MIPS QUALITY
Detail Level: Detailed
Quality 226: Preventive Care And Screening: Tobacco Use: Screening And Cessation Intervention: Patient screened for tobacco use and is an ex/non-smoker
Quality 130: Documentation Of Current Medications In The Medical Record: Current Medications with Name, Dosage, Frequency, or Route not Documented, Reason not Given
Quality 431: Preventive Care And Screening: Unhealthy Alcohol Use - Screening: Patient screened for unhealthy alcohol use using a single question and scores less than 2 times per year

## 2020-03-16 NOTE — PROCEDURE: PRESCRIPTION MEDICATION MANAGEMENT
Samples Given: Cetaphil cream
Discontinue Regimen: Fluorouracil cream 5%
Render In Strict Bullet Format?: No
Detail Level: Zone

## 2020-05-21 ENCOUNTER — OFFICE VISIT (OUTPATIENT)
Dept: PRIMARY CARE CLINIC | Age: 60
End: 2020-05-21
Payer: COMMERCIAL

## 2020-05-21 PROCEDURE — 99213 OFFICE O/P EST LOW 20 MIN: CPT | Performed by: NURSE PRACTITIONER

## 2020-05-24 LAB
SARS-COV-2: NOT DETECTED
SOURCE: NORMAL

## 2020-10-22 ENCOUNTER — APPOINTMENT (RX ONLY)
Dept: URBAN - METROPOLITAN AREA CLINIC 170 | Facility: CLINIC | Age: 60
Setting detail: DERMATOLOGY
End: 2020-10-22

## 2020-10-22 DIAGNOSIS — L57.0 ACTINIC KERATOSIS: ICD-10-CM | Status: RESOLVED

## 2020-10-22 DIAGNOSIS — Z85.828 PERSONAL HISTORY OF OTHER MALIGNANT NEOPLASM OF SKIN: ICD-10-CM

## 2020-10-22 DIAGNOSIS — L81.4 OTHER MELANIN HYPERPIGMENTATION: ICD-10-CM

## 2020-10-22 DIAGNOSIS — D18.0 HEMANGIOMA: ICD-10-CM

## 2020-10-22 DIAGNOSIS — L82.1 OTHER SEBORRHEIC KERATOSIS: ICD-10-CM

## 2020-10-22 DIAGNOSIS — D22 MELANOCYTIC NEVI: ICD-10-CM

## 2020-10-22 PROBLEM — D22.5 MELANOCYTIC NEVI OF TRUNK: Status: ACTIVE | Noted: 2020-10-22

## 2020-10-22 PROBLEM — D18.01 HEMANGIOMA OF SKIN AND SUBCUTANEOUS TISSUE: Status: ACTIVE | Noted: 2020-10-22

## 2020-10-22 PROCEDURE — ? PRESCRIPTION MEDICATION MANAGEMENT

## 2020-10-22 PROCEDURE — 99214 OFFICE O/P EST MOD 30 MIN: CPT

## 2020-10-22 PROCEDURE — ? COUNSELING

## 2020-10-22 PROCEDURE — ? ADDITIONAL NOTES

## 2020-10-22 ASSESSMENT — LOCATION ZONE DERM
LOCATION ZONE: FACE
LOCATION ZONE: TRUNK

## 2020-10-22 ASSESSMENT — LOCATION DETAILED DESCRIPTION DERM
LOCATION DETAILED: MIDDLE STERNUM
LOCATION DETAILED: LEFT LATERAL MALAR CHEEK
LOCATION DETAILED: STERNAL NOTCH
LOCATION DETAILED: LEFT MEDIAL BREAST 10-11:00 REGION
LOCATION DETAILED: UPPER STERNUM

## 2020-10-22 ASSESSMENT — LOCATION SIMPLE DESCRIPTION DERM
LOCATION SIMPLE: LEFT CHEEK
LOCATION SIMPLE: CHEST
LOCATION SIMPLE: LEFT BREAST

## 2020-10-22 NOTE — HPI: EVALUATION OF SKIN LESION(S)
What Type Of Note Output Would You Prefer (Optional)?: Bullet Format
Hpi Title: Evaluation of Skin Lesions
How Severe Are Your Spot(S)?: mild
Have Your Spot(S) Been Treated In The Past?: has not been treated
Additional History: Patient stated she used carac cream on her face, she said that she thinks they came back. Has a spot on the face next to her MOHS scar. Says she scrubbed it with a loofa x days and it would cause scabbing, then it would heal up and it repeats.

## 2020-10-22 NOTE — PROCEDURE: PRESCRIPTION MEDICATION MANAGEMENT
Render In Strict Bullet Format?: No
Detail Level: Zone
Plan: If scaliness comes back and is persistent she will restart carac as directed.

## 2020-11-16 ENCOUNTER — APPOINTMENT (RX ONLY)
Dept: URBAN - METROPOLITAN AREA CLINIC 170 | Facility: CLINIC | Age: 60
Setting detail: DERMATOLOGY
End: 2020-11-16

## 2020-11-16 DIAGNOSIS — L64.8 OTHER ANDROGENIC ALOPECIA: ICD-10-CM

## 2020-11-16 PROCEDURE — ? ORDER TESTS

## 2020-12-21 RX ORDER — FLUOROURACIL 5 MG/G
CREAM TOPICAL
Qty: 1 | Refills: 2 | Status: ERX

## 2021-01-20 ENCOUNTER — APPOINTMENT (RX ONLY)
Dept: URBAN - METROPOLITAN AREA CLINIC 170 | Facility: CLINIC | Age: 61
Setting detail: DERMATOLOGY
End: 2021-01-20

## 2021-01-20 PROBLEM — C44.311 BASAL CELL CARCINOMA OF SKIN OF NOSE: Status: ACTIVE | Noted: 2021-01-20

## 2021-01-20 PROCEDURE — ? BIOPSY BY SHAVE METHOD

## 2021-01-20 PROCEDURE — ? ADDITIONAL NOTES

## 2021-01-20 PROCEDURE — 11102 TANGNTL BX SKIN SINGLE LES: CPT

## 2021-01-20 NOTE — PROCEDURE: BIOPSY BY SHAVE METHOD
Detail Level: Detailed
Depth Of Biopsy: dermis
Was A Bandage Applied: Yes
Size Of Lesion In Cm: 1.2
X Size Of Lesion In Cm: 0.6
Anticipated Plan (Based On Presumed Biopsy Results): Call patient with results.
Additional Anticipated Plans: If malignant consider referral to plastic surgery
Biopsy Type: H and E
Biopsy Method: double edge Personna blade
Anesthesia Type: 1% Xylocaine with [id number] epinephrine and sodium bicarbonate
Anesthesia Volume In Cc (Will Not Render If 0): 3
Additional Anesthesia Volume In Cc (Will Not Render If 0): 0
Hemostasis: Electrodesiccation
Wound Care: Vaseline
Dressing: Band-Aid
Destruction After The Procedure: No
Type Of Destruction Used: Curettage
Curettage Text: The wound bed was treated with curettage after the biopsy was performed.
Cryotherapy Text: The wound bed was treated with cryotherapy after the biopsy was performed.
Electrodesiccation Text: The wound bed was treated with electrodesiccation after the biopsy was performed.
Electrodesiccation And Curettage Text: The wound bed was treated with electrodesiccation and curettage after the biopsy was performed.
Silver Nitrate Text: The wound bed was treated with silver nitrate after the biopsy was performed.
Lab: -[id number]
Consent: Written consent was obtained and risks were reviewed including but not limited to scarring, infection, bleeding, scabbing, incomplete removal, nerve damage and allergy to anesthesia.
Post-Care Instructions: I reviewed with the patient in detail post-care instructions. Patient is to keep the biopsy site dry overnight, and then apply bacitracin twice daily until healed. Patient may apply hydrogen peroxide soaks to remove any crusting.
Notification Instructions: Patient will be notified of biopsy results. However, patient instructed to call the office if not contacted within 2 weeks.
Billing Type: Third-Party Bill
Information: Selecting Yes will display possible errors in your note based on the variables you have selected. This validation is only offered as a suggestion for you. PLEASE NOTE THAT THE VALIDATION TEXT WILL BE REMOVED WHEN YOU FINALIZE YOUR NOTE. IF YOU WANT TO FAX A PRELIMINARY NOTE YOU WILL NEED TO TOGGLE THIS TO 'NO' IF YOU DO NOT WANT IT IN YOUR FAXED NOTE.

## 2021-01-20 NOTE — HPI: SKIN LESION
What Type Of Note Output Would You Prefer (Optional)?: Bullet Format
How Severe Is Your Skin Lesion?: moderate
Has Your Skin Lesion Been Treated?: been treated
Is This A New Presentation, Or A Follow-Up?: Skin Lesion
Additional History: She treated her whole nose. The nose is red, But the sore is near spot from previous  basal cell.   She has completed 25 days of the carac cream but it got worse. She could see white bumps under neath it and it scabbed.  She is worried about it being close to her eye and believes it looks a little yellow near her tear duct and the bottom of her left eye is swollen. She wants to know if this is something you would do or need to be referred out. This swelling never completely went away after surgery. It is uncomfortable. If you need to do a biopsy, she wants numbing cream prior to the lidocaine shot because she remembers how painful the last biopsy was. If needs further treatment she does not want to do MOHS because of where it is located.

## 2021-02-24 ENCOUNTER — IMMUNIZATION (OUTPATIENT)
Dept: PRIMARY CARE CLINIC | Age: 61
End: 2021-02-24
Payer: COMMERCIAL

## 2021-02-24 PROCEDURE — 0011A COVID-19, MODERNA VACCINE 100MCG/0.5ML DOSE: CPT | Performed by: FAMILY MEDICINE

## 2021-02-24 PROCEDURE — 91301 COVID-19, MODERNA VACCINE 100MCG/0.5ML DOSE: CPT | Performed by: FAMILY MEDICINE

## 2021-03-16 ENCOUNTER — HOSPITAL ENCOUNTER (OUTPATIENT)
Dept: ULTRASOUND IMAGING | Age: 61
Discharge: HOME | End: 2021-03-16
Payer: COMMERCIAL

## 2021-03-16 ENCOUNTER — HOSPITAL ENCOUNTER (OUTPATIENT)
Dept: WOMENS IMAGING | Age: 61
Discharge: HOME | End: 2021-03-16
Payer: COMMERCIAL

## 2021-03-16 DIAGNOSIS — N63.0 LUMP OR MASS IN BREAST: ICD-10-CM

## 2021-03-16 DIAGNOSIS — R92.8 ABNORMAL MAMMOGRAM: ICD-10-CM

## 2021-03-16 DIAGNOSIS — N63.0 LUMP IN FEMALE BREAST: ICD-10-CM

## 2021-03-16 PROCEDURE — 76642 ULTRASOUND BREAST LIMITED: CPT

## 2021-03-16 PROCEDURE — G0279 TOMOSYNTHESIS, MAMMO: HCPCS

## 2021-03-24 ENCOUNTER — IMMUNIZATION (OUTPATIENT)
Dept: PRIMARY CARE CLINIC | Age: 61
End: 2021-03-24
Payer: COMMERCIAL

## 2021-03-24 PROCEDURE — 91301 COVID-19, MODERNA VACCINE 100MCG/0.5ML DOSE: CPT | Performed by: FAMILY MEDICINE

## 2021-03-24 PROCEDURE — 0012A PR IMM ADMN SARSCOV2 100 MCG/0.5 ML 2ND DOSE: CPT | Performed by: FAMILY MEDICINE

## 2021-05-14 ENCOUNTER — APPOINTMENT (OUTPATIENT)
Dept: CT IMAGING | Age: 61
DRG: 897 | End: 2021-05-14
Payer: COMMERCIAL

## 2021-05-14 ENCOUNTER — HOSPITAL ENCOUNTER (INPATIENT)
Age: 61
LOS: 3 days | Discharge: HOME | DRG: 897 | End: 2021-05-17
Attending: EMERGENCY MEDICINE | Admitting: INTERNAL MEDICINE
Payer: COMMERCIAL

## 2021-05-14 DIAGNOSIS — F10.10 ALCOHOL ABUSE: ICD-10-CM

## 2021-05-14 DIAGNOSIS — F10.930 ALCOHOL WITHDRAWAL SYNDROME WITHOUT COMPLICATION (HCC): ICD-10-CM

## 2021-05-14 DIAGNOSIS — E87.1 HYPONATREMIA: Primary | ICD-10-CM

## 2021-05-14 LAB
ALBUMIN SERPL-MCNC: 4.3 G/DL (ref 3.4–5)
ALP BLD-CCNC: 86 U/L (ref 40–129)
ALT SERPL-CCNC: 73 U/L (ref 10–40)
AMORPHOUS: ABNORMAL /HPF
ANION GAP SERPL CALCULATED.3IONS-SCNC: 17 MMOL/L (ref 3–16)
AST SERPL-CCNC: 135 U/L (ref 15–37)
BACTERIA: ABNORMAL /HPF
BASOPHILS ABSOLUTE: 0 K/UL (ref 0–0.2)
BASOPHILS RELATIVE PERCENT: 1.3 %
BILIRUB SERPL-MCNC: 0.7 MG/DL (ref 0–1)
BILIRUBIN DIRECT: <0.2 MG/DL (ref 0–0.3)
BILIRUBIN URINE: NEGATIVE
BILIRUBIN, INDIRECT: ABNORMAL MG/DL (ref 0–1)
BLOOD, URINE: NEGATIVE
BUN BLDV-MCNC: 6 MG/DL (ref 7–20)
CALCIUM SERPL-MCNC: 10.1 MG/DL (ref 8.3–10.6)
CHLORIDE BLD-SCNC: 88 MMOL/L (ref 99–110)
CLARITY: CLEAR
CO2: 22 MMOL/L (ref 21–32)
COLOR: YELLOW
CREAT SERPL-MCNC: 0.5 MG/DL (ref 0.6–1.2)
CRYSTALS, UA: ABNORMAL /HPF
EOSINOPHILS ABSOLUTE: 0.1 K/UL (ref 0–0.6)
EOSINOPHILS RELATIVE PERCENT: 2.2 %
EPITHELIAL CELLS, UA: ABNORMAL /HPF (ref 0–5)
ETHANOL: 46 MG/DL (ref 0–0.08)
GFR AFRICAN AMERICAN: >60
GFR NON-AFRICAN AMERICAN: >60
GLUCOSE BLD-MCNC: 100 MG/DL (ref 70–99)
GLUCOSE URINE: NEGATIVE MG/DL
HCT VFR BLD CALC: 34.1 % (ref 36–48)
HEMOGLOBIN: 11.2 G/DL (ref 12–16)
KETONES, URINE: ABNORMAL MG/DL
LEUKOCYTE ESTERASE, URINE: ABNORMAL
LIPASE: 38 U/L (ref 13–60)
LYMPHOCYTES ABSOLUTE: 1.1 K/UL (ref 1–5.1)
LYMPHOCYTES RELATIVE PERCENT: 28.1 %
MCH RBC QN AUTO: 28.9 PG (ref 26–34)
MCHC RBC AUTO-ENTMCNC: 32.8 G/DL (ref 31–36)
MCV RBC AUTO: 88.1 FL (ref 80–100)
MICROSCOPIC EXAMINATION: YES
MONOCYTES ABSOLUTE: 0.4 K/UL (ref 0–1.3)
MONOCYTES RELATIVE PERCENT: 10.9 %
MUCUS: ABNORMAL /LPF
NEUTROPHILS ABSOLUTE: 2.2 K/UL (ref 1.7–7.7)
NEUTROPHILS RELATIVE PERCENT: 57.5 %
NITRITE, URINE: NEGATIVE
PDW BLD-RTO: 18.7 % (ref 12.4–15.4)
PH UA: 6 (ref 5–8)
PLATELET # BLD: 161 K/UL (ref 135–450)
PMV BLD AUTO: 8.6 FL (ref 5–10.5)
POTASSIUM REFLEX MAGNESIUM: 4.8 MMOL/L (ref 3.5–5.1)
PROTEIN UA: NEGATIVE MG/DL
RBC # BLD: 3.87 M/UL (ref 4–5.2)
RBC UA: ABNORMAL /HPF (ref 0–4)
SODIUM BLD-SCNC: 127 MMOL/L (ref 136–145)
SPECIFIC GRAVITY UA: 1.02 (ref 1–1.03)
TOTAL PROTEIN: 8.4 G/DL (ref 6.4–8.2)
URINE REFLEX TO CULTURE: ABNORMAL
URINE TYPE: ABNORMAL
UROBILINOGEN, URINE: 0.2 E.U./DL
WBC # BLD: 3.8 K/UL (ref 4–11)
WBC UA: ABNORMAL /HPF (ref 0–5)

## 2021-05-14 PROCEDURE — 85025 COMPLETE CBC W/AUTO DIFF WBC: CPT

## 2021-05-14 PROCEDURE — 2500000003 HC RX 250 WO HCPCS: Performed by: STUDENT IN AN ORGANIZED HEALTH CARE EDUCATION/TRAINING PROGRAM

## 2021-05-14 PROCEDURE — 96376 TX/PRO/DX INJ SAME DRUG ADON: CPT

## 2021-05-14 PROCEDURE — 74176 CT ABD & PELVIS W/O CONTRAST: CPT

## 2021-05-14 PROCEDURE — 6360000002 HC RX W HCPCS: Performed by: STUDENT IN AN ORGANIZED HEALTH CARE EDUCATION/TRAINING PROGRAM

## 2021-05-14 PROCEDURE — 83690 ASSAY OF LIPASE: CPT

## 2021-05-14 PROCEDURE — 96374 THER/PROPH/DIAG INJ IV PUSH: CPT

## 2021-05-14 PROCEDURE — 80076 HEPATIC FUNCTION PANEL: CPT

## 2021-05-14 PROCEDURE — 2580000003 HC RX 258: Performed by: EMERGENCY MEDICINE

## 2021-05-14 PROCEDURE — 82077 ASSAY SPEC XCP UR&BREATH IA: CPT

## 2021-05-14 PROCEDURE — 80048 BASIC METABOLIC PNL TOTAL CA: CPT

## 2021-05-14 PROCEDURE — 99283 EMERGENCY DEPT VISIT LOW MDM: CPT

## 2021-05-14 PROCEDURE — C9113 INJ PANTOPRAZOLE SODIUM, VIA: HCPCS | Performed by: STUDENT IN AN ORGANIZED HEALTH CARE EDUCATION/TRAINING PROGRAM

## 2021-05-14 PROCEDURE — 84443 ASSAY THYROID STIM HORMONE: CPT

## 2021-05-14 PROCEDURE — 6370000000 HC RX 637 (ALT 250 FOR IP): Performed by: STUDENT IN AN ORGANIZED HEALTH CARE EDUCATION/TRAINING PROGRAM

## 2021-05-14 PROCEDURE — 81001 URINALYSIS AUTO W/SCOPE: CPT

## 2021-05-14 PROCEDURE — 36415 COLL VENOUS BLD VENIPUNCTURE: CPT

## 2021-05-14 PROCEDURE — 6360000002 HC RX W HCPCS: Performed by: EMERGENCY MEDICINE

## 2021-05-14 PROCEDURE — 1200000000 HC SEMI PRIVATE

## 2021-05-14 PROCEDURE — 2580000003 HC RX 258: Performed by: STUDENT IN AN ORGANIZED HEALTH CARE EDUCATION/TRAINING PROGRAM

## 2021-05-14 RX ORDER — CHLORDIAZEPOXIDE HYDROCHLORIDE 5 MG/1
5 CAPSULE, GELATIN COATED ORAL EVERY 4 HOURS PRN
Status: DISCONTINUED | OUTPATIENT
Start: 2021-05-14 | End: 2021-05-14

## 2021-05-14 RX ORDER — CHLORDIAZEPOXIDE HYDROCHLORIDE 25 MG/1
25 CAPSULE, GELATIN COATED ORAL 4 TIMES DAILY
Status: DISCONTINUED | OUTPATIENT
Start: 2021-05-14 | End: 2021-05-15

## 2021-05-14 RX ORDER — VALSARTAN 160 MG/1
160 TABLET ORAL DAILY
Status: DISCONTINUED | OUTPATIENT
Start: 2021-05-15 | End: 2021-05-17 | Stop reason: HOSPADM

## 2021-05-14 RX ORDER — LORAZEPAM 1 MG/1
1 TABLET ORAL
Status: DISCONTINUED | OUTPATIENT
Start: 2021-05-14 | End: 2021-05-17 | Stop reason: HOSPADM

## 2021-05-14 RX ORDER — SODIUM CHLORIDE 9 MG/ML
25 INJECTION, SOLUTION INTRAVENOUS PRN
Status: DISCONTINUED | OUTPATIENT
Start: 2021-05-14 | End: 2021-05-17 | Stop reason: HOSPADM

## 2021-05-14 RX ORDER — ONDANSETRON 2 MG/ML
4 INJECTION INTRAMUSCULAR; INTRAVENOUS EVERY 6 HOURS PRN
Status: DISCONTINUED | OUTPATIENT
Start: 2021-05-14 | End: 2021-05-17 | Stop reason: HOSPADM

## 2021-05-14 RX ORDER — VALSARTAN 160 MG/1
160 TABLET ORAL DAILY
COMMUNITY

## 2021-05-14 RX ORDER — LORAZEPAM 2 MG/ML
2 INJECTION INTRAMUSCULAR ONCE
Status: COMPLETED | OUTPATIENT
Start: 2021-05-14 | End: 2021-05-14

## 2021-05-14 RX ORDER — MULTIVITAMIN WITH IRON
1 TABLET ORAL DAILY
Status: DISCONTINUED | OUTPATIENT
Start: 2021-05-14 | End: 2021-05-17 | Stop reason: HOSPADM

## 2021-05-14 RX ORDER — ZOLPIDEM TARTRATE 12.5 MG/1
12.5 TABLET, FILM COATED, EXTENDED RELEASE ORAL NIGHTLY PRN
COMMUNITY

## 2021-05-14 RX ORDER — PANTOPRAZOLE SODIUM 40 MG/1
40 GRANULE, DELAYED RELEASE ORAL
COMMUNITY

## 2021-05-14 RX ORDER — DIPHENHYDRAMINE HYDROCHLORIDE 50 MG/ML
25 INJECTION INTRAMUSCULAR; INTRAVENOUS ONCE
Status: DISCONTINUED | OUTPATIENT
Start: 2021-05-14 | End: 2021-05-14

## 2021-05-14 RX ORDER — SODIUM CHLORIDE 0.9 % (FLUSH) 0.9 %
5-40 SYRINGE (ML) INJECTION EVERY 12 HOURS SCHEDULED
Status: DISCONTINUED | OUTPATIENT
Start: 2021-05-14 | End: 2021-05-17 | Stop reason: HOSPADM

## 2021-05-14 RX ORDER — PROMETHAZINE HYDROCHLORIDE 12.5 MG/1
12.5 TABLET ORAL EVERY 6 HOURS PRN
Status: DISCONTINUED | OUTPATIENT
Start: 2021-05-14 | End: 2021-05-17 | Stop reason: HOSPADM

## 2021-05-14 RX ORDER — LORAZEPAM 2 MG/ML
4 INJECTION INTRAMUSCULAR
Status: DISCONTINUED | OUTPATIENT
Start: 2021-05-14 | End: 2021-05-17 | Stop reason: HOSPADM

## 2021-05-14 RX ORDER — LORAZEPAM 2 MG/ML
2 INJECTION INTRAMUSCULAR
Status: DISCONTINUED | OUTPATIENT
Start: 2021-05-14 | End: 2021-05-17 | Stop reason: HOSPADM

## 2021-05-14 RX ORDER — SODIUM CHLORIDE 0.9 % (FLUSH) 0.9 %
5-40 SYRINGE (ML) INJECTION PRN
Status: DISCONTINUED | OUTPATIENT
Start: 2021-05-14 | End: 2021-05-17 | Stop reason: HOSPADM

## 2021-05-14 RX ORDER — 0.9 % SODIUM CHLORIDE 0.9 %
1000 INTRAVENOUS SOLUTION INTRAVENOUS ONCE
Status: COMPLETED | OUTPATIENT
Start: 2021-05-14 | End: 2021-05-14

## 2021-05-14 RX ORDER — LORAZEPAM 2 MG/ML
1 INJECTION INTRAMUSCULAR
Status: DISCONTINUED | OUTPATIENT
Start: 2021-05-14 | End: 2021-05-17 | Stop reason: HOSPADM

## 2021-05-14 RX ORDER — ACETAMINOPHEN 650 MG/1
650 SUPPOSITORY RECTAL EVERY 6 HOURS PRN
Status: DISCONTINUED | OUTPATIENT
Start: 2021-05-14 | End: 2021-05-17 | Stop reason: HOSPADM

## 2021-05-14 RX ORDER — ACETAMINOPHEN 325 MG/1
650 TABLET ORAL EVERY 6 HOURS PRN
Status: DISCONTINUED | OUTPATIENT
Start: 2021-05-14 | End: 2021-05-17 | Stop reason: HOSPADM

## 2021-05-14 RX ORDER — LORAZEPAM 1 MG/1
4 TABLET ORAL
Status: DISCONTINUED | OUTPATIENT
Start: 2021-05-14 | End: 2021-05-17 | Stop reason: HOSPADM

## 2021-05-14 RX ORDER — SODIUM CHLORIDE 9 MG/ML
INJECTION, SOLUTION INTRAVENOUS CONTINUOUS
Status: DISCONTINUED | OUTPATIENT
Start: 2021-05-14 | End: 2021-05-16

## 2021-05-14 RX ORDER — AMOXICILLIN 500 MG/1
500 TABLET, FILM COATED ORAL 3 TIMES DAILY
Status: ON HOLD | COMMUNITY
End: 2021-05-17 | Stop reason: HOSPADM

## 2021-05-14 RX ORDER — PANTOPRAZOLE SODIUM 40 MG/10ML
40 INJECTION, POWDER, LYOPHILIZED, FOR SOLUTION INTRAVENOUS DAILY
Status: DISCONTINUED | OUTPATIENT
Start: 2021-05-14 | End: 2021-05-15

## 2021-05-14 RX ORDER — LORAZEPAM 1 MG/1
3 TABLET ORAL
Status: DISCONTINUED | OUTPATIENT
Start: 2021-05-14 | End: 2021-05-17 | Stop reason: HOSPADM

## 2021-05-14 RX ORDER — METHYLPREDNISOLONE SODIUM SUCCINATE 40 MG/ML
40 INJECTION, POWDER, LYOPHILIZED, FOR SOLUTION INTRAMUSCULAR; INTRAVENOUS ONCE
Status: DISCONTINUED | OUTPATIENT
Start: 2021-05-14 | End: 2021-05-14

## 2021-05-14 RX ORDER — LORAZEPAM 1 MG/1
2 TABLET ORAL
Status: DISCONTINUED | OUTPATIENT
Start: 2021-05-14 | End: 2021-05-17 | Stop reason: HOSPADM

## 2021-05-14 RX ORDER — LORAZEPAM 2 MG/ML
3 INJECTION INTRAMUSCULAR
Status: DISCONTINUED | OUTPATIENT
Start: 2021-05-14 | End: 2021-05-17 | Stop reason: HOSPADM

## 2021-05-14 RX ORDER — POLYETHYLENE GLYCOL 3350 17 G/17G
17 POWDER, FOR SOLUTION ORAL DAILY PRN
Status: DISCONTINUED | OUTPATIENT
Start: 2021-05-14 | End: 2021-05-17 | Stop reason: HOSPADM

## 2021-05-14 RX ADMIN — PANTOPRAZOLE SODIUM 40 MG: 40 INJECTION, POWDER, FOR SOLUTION INTRAVENOUS at 22:23

## 2021-05-14 RX ADMIN — LORAZEPAM 2 MG: 2 INJECTION INTRAMUSCULAR; INTRAVENOUS at 20:32

## 2021-05-14 RX ADMIN — SODIUM CHLORIDE: 9 INJECTION, SOLUTION INTRAVENOUS at 21:52

## 2021-05-14 RX ADMIN — FOLIC ACID: 5 INJECTION, SOLUTION INTRAMUSCULAR; INTRAVENOUS; SUBCUTANEOUS at 22:34

## 2021-05-14 RX ADMIN — SODIUM CHLORIDE 1000 ML: 9 INJECTION, SOLUTION INTRAVENOUS at 20:32

## 2021-05-14 RX ADMIN — THERA TABS 1 TABLET: TAB at 22:23

## 2021-05-14 RX ADMIN — LORAZEPAM 2 MG: 2 INJECTION INTRAMUSCULAR; INTRAVENOUS at 17:37

## 2021-05-14 RX ADMIN — CHLORDIAZEPOXIDE HYDROCHLORIDE 25 MG: 25 CAPSULE ORAL at 22:23

## 2021-05-14 ASSESSMENT — ENCOUNTER SYMPTOMS
NAUSEA: 0
ABDOMINAL PAIN: 1
DIARRHEA: 0
SHORTNESS OF BREATH: 1
VOMITING: 0
COUGH: 0

## 2021-05-14 NOTE — ED NOTES
Patient complains of bloating and alcohol withdrawal.  PIV placed using aseptic technique per hospital policy. Blood collected and sent to lab.      Annita Yoon RN  05/14/21 5740

## 2021-05-14 NOTE — ED NOTES
Bed: B22-22  Expected date:   Expected time:   Means of arrival:   Comments:  [address] Luís Garibay RN  05/14/21 5632

## 2021-05-14 NOTE — ED PROVIDER NOTES
[address] [place]          ATTENDING PHYSICIAN NOTE       Date of evaluation: 5/14/2021    Chief Complaint     Bloated (abdominal bloating and weight gain)      History of Present Illness     Satinder Davis is a 57 y.o. female who presents at the request of her gastroenterologist for an evaluation for progressive abdominal distention and bloating over the last several months and weight gain. The patient drinks alcohol and her consumption has gone up as a result of the pandemic. She reports drinking 1 [place] about every 4 hours around-the-clock to prevent symptoms of withdrawal developing. She wants to be alcohol free by July when her son is getting . She is afraid that she will go through fairly severe withdrawal if not managed medically. She tried to go to [facility] but did not get a good feeling about the place therefore she came here on the advice of her GI doctor. She is also concerned about the weight gain over the last several months. She feels like she is not eating very much but is gaining weight and is concerned about liver disease and ascites. She does have some slight right upper quadrant pain from time to time but is not nauseous or vomiting. She recently completed a course of antibiotics for a tooth problem and developed a yeast infection and is finishing treatment for that. She also complains of shortness of breath which has been present for many months as well which has already been evaluated by her cardiologist down at [facility].    Review of Systems     Review of Systems   Constitutional: Positive for activity change and appetite change. Negative for chills and fever. Respiratory: Positive for shortness of breath. Negative for cough. Cardiovascular: Negative for chest pain. Gastrointestinal: Positive for abdominal pain. Negative for diarrhea, nausea and vomiting.    Genitourinary: Negative for difficulty urinating and dysuria. Neurological: Positive for tremors. Psychiatric/Behavioral: Positive for sleep disturbance. Negative for agitation and hallucinations. The patient is nervous/anxious. All other systems reviewed and are negative. Past Medical, Surgical, Family, and Social History     She has a past medical history of CAD (coronary artery disease), Cervical dystonia, CHF (congestive heart failure) ([address] Dental crown present, Fatty liver, Hyperlipidemia, Hypertension, Lichen sclerosus, and PONV (postoperative nausea and vomiting). She has a past surgical history that includes Breast surgery; Endoscopy, colon, diagnostic; Colonoscopy; Endometrial ablation; Colonoscopy (N/A, 7/2/2019); and Colonoscopy (7/2/2019). Her family history is not on file. She reports that she has never smoked. She has never used smokeless tobacco. She reports current alcohol use of about 3.0 standard drinks of alcohol per week. She reports that she does not use drugs. Medications     Current Discharge Medication List      CONTINUE these medications which have NOT CHANGED    Details   pantoprazole sodium (PROTONIX) 40 MG PACK packet Take 40 mg by mouth every morning (before breakfast)      valsartan (DIOVAN) 160 MG tablet Take 160 mg by mouth daily 1/2 to 1 tablet daily based on BP      Amoxicillin 500 MG TABS Take 500 mg by mouth 3 times daily End date: 05/14/21      polyethyl glycol-propyl glycol 0.4-0.3 % (SYSTANE) 0.4-0.3 % ophthalmic solution Place 1 drop into both eyes nightly as needed for Dry Eyes      zolpidem (AMBIEN CR) 12.5 MG extended release tablet Take 12.5 mg by mouth nightly as needed for Sleep. TERCONAZ[place]LE [place] Place vaginally      LORazepam (ATIVAN) 1 MG tablet Take 2 mg by mouth nightly. Allergies     She is allergic to atorvastatin; iodine; pravastatin; rosuvastatin; shellfish allergy; sulfa antibiotics; and tylenol [acetaminophen].     Physical Exam     INITIAL VITALS: BP: (!) 166/98, Temp: 98.4 °F MPV 8.6 5.0 - 10.5 fL    Neutrophils % 57.5 %    Lymphocytes % 28.1 %    Monocytes % 10.9 %    Eosinophils % 2.2 %    Basophils % 1.3 %    Neutrophils Absolute 2.2 1.7 - 7.7 K/uL    Lymphocytes Absolute 1.1 1.0 - 5.1 K/uL    Monocytes Absolute 0.4 0.0 - 1.3 K/uL    Eosinophils Absolute 0.1 0.0 - 0.6 K/uL    Basophils Absolute 0.0 0.0 - 0.2 K/uL   Basic Metabolic Panel w/ Reflex to MG   Result Value Ref Range    Sodium 127 (L) 136 - 145 mmol/L    Potassium reflex Magnesium 4.8 3.5 - 5.1 mmol/L    Chloride 88 (L) 99 - 110 mmol/L    CO2 22 21 - 32 mmol/L    Anion Gap 17 (H) 3 - 16    Glucose 100 (H) 70 - 99 mg/dL    BUN 6 (L) 7 - 20 mg/dL    CREATININE 0.5 (L) 0.6 - 1.2 mg/dL    GFR Non-African American >60 >60    GFR African American >60 >60    Calcium 10.1 8.3 - 10.6 mg/dL   Hepatic Function Panel   Result Value Ref Range    Total Protein 8.4 (H) 6.4 - 8.2 g/dL    Albumin 4.3 3.4 - 5.0 g/dL    Alkaline Phosphatase 86 40 - 129 U/L    ALT 73 (H) 10 - 40 U/L     (H) 15 - 37 U/L    Total Bilirubin 0.7 0.0 - 1.0 mg/dL    Bilirubin, Direct <0.2 0.0 - 0.3 mg/dL    Bilirubin, Indirect see below 0.0 - 1.0 mg/dL   Lipase   Result Value Ref Range    Lipase 38.0 13.0 - 60.0 U/L   Urinalysis Reflex to Culture    Specimen: Urine, clean catch   Result Value Ref Range    Color, UA Yellow Straw/Yellow    Clarity, UA Clear Clear    Glucose, Ur Negative Negative mg/dL    Bilirubin Urine Negative Negative    Ketones, Urine TRACE (A) Negative mg/dL    Specific Gravity, UA 1.025 1.005 - 1.030    Blood, Urine Negative Negative    pH, UA 6.0 5.0 - 8.0    Protein, UA Negative Negative mg/dL    Urobilinogen, Urine 0.2 <2.0 E.U./dL    Nitrite, Urine Negative Negative    Leukocyte Esterase, Urine SMALL (A) Negative    Microscopic Examination YES     Urine Type NotGiven     Urine Reflex to Culture Not Indicated    Microscopic Urinalysis   Result Value Ref Range    Mucus, UA 1+ (A) None Seen /LPF    WBC, UA 0-2 0 - 5 /HPF    RBC, UA 0-2 0 - 4 /HPF    Epithelial Cells, UA 11-20 (A) 0 - 5 /HPF    Bacteria, UA 2+ (A) None Seen /HPF    Amorphous, UA 2+ /HPF    Crystals, UA 1+ Ca. Oxalate (A) None Seen /HPF     RECENT VITALS:  BP: (!) 171/97,Temp: 98.4 °F (36.9 °C), Pulse: 87, Resp: 18, SpO2: 95 %       ED Course     Nursing Notes, Past Medical Hx, Past Surgical Hx, Social Hx,Allergies, and Family Hx were reviewed. patient was given the following medications:  Orders Placed This Encounter   Medications    LORazepam (ATIVAN) injection 2 mg    DISCONTD: methylPREDNISolone sodium (SOLU-MEDROL) injection 40 mg    DISCONTD: diphenhydrAMINE (BENADRYL) injection 25 mg    LORazepam (ATIVAN) injection 2 mg    0.9 % sodium chloride bolus       CONSULTS:  IP CONSULT TO HOSPITALIST  IP CONSULT TO [facility] Lucas Mcqueen DECISIONMAKING / ASSESSMENT / Rashid Petros is a 57 y.o. female presenting with anxiety over the COVID-19 pandemic and resulting use of alcohol who now wants to stop drinking. Her evaluation has found her to be hyponatremic to 127 for which IV fluids were initiated. The patient has a drink every 4 hours and here has required 5 mg of Ativan to treat the tremors and withdrawal symptoms. I do think the patient could experience significant withdrawal if not managed medically based on the amount of Ativan she has needed here. Evaluation of her abdomen found only gallstones and sludge but no other acute intra-abdominal process. There was no ascites. I suspect the abdominal distention that she is describing is related to weight gain as opposed to anything intra-abdominal as there is nothing on the scan that would account for it. I think the patient needs to be admitted for correction of her electrolytes and treatment of alcohol withdrawal.  The hospitalist was contacted and admitting resident contacted. Clinical Impression     1. Hyponatremia    2.  [facility] withdrawal syndrome [facility]        Disposition DISPOSITION Admitted 05/14/2021 09:03:12 PM       Roger Robbins MD  05/14/21 [id number]

## 2021-05-15 LAB
A/G RATIO: 1.2 (ref 1.1–2.2)
ALBUMIN SERPL-MCNC: 3.9 G/DL (ref 3.4–5)
ALP BLD-CCNC: 74 U/L (ref 40–129)
ALT SERPL-CCNC: 53 U/L (ref 10–40)
AMPHETAMINE SCREEN, URINE: NORMAL
ANION GAP SERPL CALCULATED.3IONS-SCNC: 11 MMOL/L (ref 3–16)
AST SERPL-CCNC: 84 U/L (ref 15–37)
BARBITURATE SCREEN URINE: NORMAL
BASOPHILS ABSOLUTE: 0 K/UL (ref 0–0.2)
BASOPHILS RELATIVE PERCENT: 1.2 %
BENZODIAZEPINE SCREEN, URINE: NORMAL
BILIRUB SERPL-MCNC: 1.2 MG/DL (ref 0–1)
BUN BLDV-MCNC: 7 MG/DL (ref 7–20)
C DIFF TOXIN/ANTIGEN: NORMAL
CALCIUM SERPL-MCNC: 9.6 MG/DL (ref 8.3–10.6)
CANNABINOID SCREEN URINE: NORMAL
CHLORIDE BLD-SCNC: 92 MMOL/L (ref 99–110)
CO2: 26 MMOL/L (ref 21–32)
COCAINE METABOLITE SCREEN URINE: NORMAL
CREAT SERPL-MCNC: 0.6 MG/DL (ref 0.6–1.2)
EOSINOPHILS ABSOLUTE: 0.1 K/UL (ref 0–0.6)
EOSINOPHILS RELATIVE PERCENT: 4.4 %
ETHANOL: NORMAL MG/DL (ref 0–0.08)
GFR AFRICAN AMERICAN: >60
GFR NON-AFRICAN AMERICAN: >60
GLOBULIN: 3.2 G/DL
GLUCOSE BLD-MCNC: 110 MG/DL (ref 70–99)
HCT VFR BLD CALC: 30.5 % (ref 36–48)
HEMOGLOBIN: 10.1 G/DL (ref 12–16)
LYMPHOCYTES ABSOLUTE: 1 K/UL (ref 1–5.1)
LYMPHOCYTES RELATIVE PERCENT: 30.7 %
Lab: NORMAL
MCH RBC QN AUTO: 29.1 PG (ref 26–34)
MCHC RBC AUTO-ENTMCNC: 33 G/DL (ref 31–36)
MCV RBC AUTO: 88.3 FL (ref 80–100)
METHADONE SCREEN, URINE: NORMAL
MONOCYTES ABSOLUTE: 0.4 K/UL (ref 0–1.3)
MONOCYTES RELATIVE PERCENT: 11.6 %
NEUTROPHILS ABSOLUTE: 1.7 K/UL (ref 1.7–7.7)
NEUTROPHILS RELATIVE PERCENT: 52.1 %
OPIATE SCREEN URINE: NORMAL
OSMOLALITY URINE: 237 MOSM/KG (ref 390–1070)
OSMOLALITY: 270 MOSM/KG (ref 278–305)
OXYCODONE URINE: NORMAL
PDW BLD-RTO: 18.5 % (ref 12.4–15.4)
PH UA: 8
PHENCYCLIDINE SCREEN URINE: NORMAL
PLATELET # BLD: 127 K/UL (ref 135–450)
PMV BLD AUTO: 8.3 FL (ref 5–10.5)
POTASSIUM REFLEX MAGNESIUM: 4.2 MMOL/L (ref 3.5–5.1)
PROPOXYPHENE SCREEN: NORMAL
RBC # BLD: 3.45 M/UL (ref 4–5.2)
SODIUM BLD-SCNC: 126 MMOL/L (ref 136–145)
SODIUM BLD-SCNC: 129 MMOL/L (ref 136–145)
SODIUM BLD-SCNC: 130 MMOL/L (ref 136–145)
SODIUM URINE: 82 MMOL/L
TOTAL PROTEIN: 7.1 G/DL (ref 6.4–8.2)
TSH REFLEX: 2.14 UIU/ML (ref 0.27–4.2)
WBC # BLD: 3.2 K/UL (ref 4–11)

## 2021-05-15 PROCEDURE — 6370000000 HC RX 637 (ALT 250 FOR IP): Performed by: STUDENT IN AN ORGANIZED HEALTH CARE EDUCATION/TRAINING PROGRAM

## 2021-05-15 PROCEDURE — 84300 ASSAY OF URINE SODIUM: CPT

## 2021-05-15 PROCEDURE — 1200000000 HC SEMI PRIVATE

## 2021-05-15 PROCEDURE — 87324 CLOSTRIDIUM AG IA: CPT

## 2021-05-15 PROCEDURE — 83935 ASSAY OF URINE OSMOLALITY: CPT

## 2021-05-15 PROCEDURE — 87493 C DIFF AMPLIFIED PROBE: CPT

## 2021-05-15 PROCEDURE — 84295 ASSAY OF SERUM SODIUM: CPT

## 2021-05-15 PROCEDURE — 80053 COMPREHEN METABOLIC PANEL: CPT

## 2021-05-15 PROCEDURE — 82077 ASSAY SPEC XCP UR&BREATH IA: CPT

## 2021-05-15 PROCEDURE — 85025 COMPLETE CBC W/AUTO DIFF WBC: CPT

## 2021-05-15 PROCEDURE — 36415 COLL VENOUS BLD VENIPUNCTURE: CPT

## 2021-05-15 PROCEDURE — 6360000002 HC RX W HCPCS: Performed by: STUDENT IN AN ORGANIZED HEALTH CARE EDUCATION/TRAINING PROGRAM

## 2021-05-15 PROCEDURE — 80061 LIPID PANEL: CPT

## 2021-05-15 PROCEDURE — 2580000003 HC RX 258: Performed by: STUDENT IN AN ORGANIZED HEALTH CARE EDUCATION/TRAINING PROGRAM

## 2021-05-15 PROCEDURE — C9113 INJ PANTOPRAZOLE SODIUM, VIA: HCPCS | Performed by: STUDENT IN AN ORGANIZED HEALTH CARE EDUCATION/TRAINING PROGRAM

## 2021-05-15 PROCEDURE — 83930 ASSAY OF BLOOD OSMOLALITY: CPT

## 2021-05-15 PROCEDURE — 87449 NOS EACH ORGANISM AG IA: CPT

## 2021-05-15 PROCEDURE — 80307 DRUG TEST PRSMV CHEM ANLYZR: CPT

## 2021-05-15 RX ORDER — SERTRALINE HYDROCHLORIDE 25 MG/1
25 TABLET, FILM COATED ORAL DAILY
Status: DISCONTINUED | OUTPATIENT
Start: 2021-05-16 | End: 2021-05-17 | Stop reason: HOSPADM

## 2021-05-15 RX ORDER — PANTOPRAZOLE SODIUM 40 MG/1
40 TABLET, DELAYED RELEASE ORAL
Status: DISCONTINUED | OUTPATIENT
Start: 2021-05-15 | End: 2021-05-16

## 2021-05-15 RX ORDER — GAUZE BANDAGE 2" X 2"
100 BANDAGE TOPICAL DAILY
Status: DISCONTINUED | OUTPATIENT
Start: 2021-05-15 | End: 2021-05-17 | Stop reason: HOSPADM

## 2021-05-15 RX ORDER — CHLORDIAZEPOXIDE HYDROCHLORIDE 25 MG/1
50 CAPSULE, GELATIN COATED ORAL 4 TIMES DAILY
Status: DISCONTINUED | OUTPATIENT
Start: 2021-05-15 | End: 2021-05-16

## 2021-05-15 RX ADMIN — CHLORDIAZEPOXIDE HYDROCHLORIDE 50 MG: 25 CAPSULE ORAL at 16:21

## 2021-05-15 RX ADMIN — PANTOPRAZOLE SODIUM 40 MG: 40 TABLET, DELAYED RELEASE ORAL at 16:21

## 2021-05-15 RX ADMIN — PANTOPRAZOLE SODIUM 40 MG: 40 INJECTION, POWDER, FOR SOLUTION INTRAVENOUS at 09:12

## 2021-05-15 RX ADMIN — LORAZEPAM 1 MG: 1 TABLET ORAL at 14:02

## 2021-05-15 RX ADMIN — VALSARTAN 160 MG: 160 TABLET, FILM COATED ORAL at 09:11

## 2021-05-15 RX ADMIN — LORAZEPAM 2 MG: 2 INJECTION INTRAMUSCULAR; INTRAVENOUS at 02:49

## 2021-05-15 RX ADMIN — SODIUM CHLORIDE: 9 INJECTION, SOLUTION INTRAVENOUS at 13:41

## 2021-05-15 RX ADMIN — CHLORDIAZEPOXIDE HYDROCHLORIDE 50 MG: 25 CAPSULE ORAL at 22:21

## 2021-05-15 RX ADMIN — THIAMINE HCL TAB 100 MG 100 MG: 100 TAB at 12:29

## 2021-05-15 RX ADMIN — ONDANSETRON 4 MG: 2 INJECTION INTRAMUSCULAR; INTRAVENOUS at 20:06

## 2021-05-15 RX ADMIN — ENOXAPARIN SODIUM 40 MG: 40 INJECTION SUBCUTANEOUS at 09:11

## 2021-05-15 RX ADMIN — CHLORDIAZEPOXIDE HYDROCHLORIDE 50 MG: 25 CAPSULE ORAL at 12:29

## 2021-05-15 RX ADMIN — LORAZEPAM 4 MG: 2 INJECTION INTRAMUSCULAR; INTRAVENOUS at 01:49

## 2021-05-15 RX ADMIN — CHLORDIAZEPOXIDE HYDROCHLORIDE 25 MG: 25 CAPSULE ORAL at 09:11

## 2021-05-15 RX ADMIN — LORAZEPAM 4 MG: 2 INJECTION INTRAMUSCULAR; INTRAVENOUS at 00:49

## 2021-05-15 NOTE — PROGRESS NOTES
4 Eyes Admission Assessment     I agree as the admission nurse that 2 RN's have performed a thorough Head to Toe Skin Assessment on the patient. ALL assessment sites listed below have been assessed on admission. Areas assessed by both nurses: lula and gurvinder  [x]   Head, Face, and Ears   [x]   Shoulders, Back, and Chest  [x]   Arms, Elbows, and Hands   [x]   Coccyx, Sacrum, and Ischum  [x]   Legs, Feet, and Heels        Does the Patient have Skin Breakdown?   No         Germán Prevention initiated:  No   Wound Care Orders initiated:  No      [facility] nurse consulted for Pressure Injury (Stage 3,4, Unstageable, DTI, NWPT, and Complex wounds):  No      Nurse 1 eSignature: Electronically signed by Sarah Pinto RN on 5/15/21 at 12:21 AM EDT    **SHARE this note so that the co-signing nurse is able to place an eSignature**    Nurse 2 eSignature: Electronically signed by Yodit Riley RN on 5/15/21 at 6:47 AM EDT

## 2021-05-15 NOTE — PLAN OF CARE
Problem: Discharge Planning:  Goal: Discharged to appropriate level of care  Description: Discharged to appropriate level of care  Outcome: Ongoing     Problem: Fluid Volume - Deficit:  Goal: Absence of fluid volume deficit signs and symptoms  Description: Absence of fluid volume deficit signs and symptoms  Outcome: Ongoing     Problem: Nutrition Deficit:  Goal: Ability to achieve adequate nutritional intake will improve  Description: Ability to achieve adequate nutritional intake will improve  Outcome: Ongoing     Problem: Sleep Pattern Disturbance:  Goal: Appears well-rested  Description: Appears well-rested  Outcome: Ongoing     Problem: Violence - Risk of, Self/Other-Directed:  Goal: Knowledge of developmental care interventions  Description: Absence of violence  Outcome: Ongoing     Problem: Pain:  Goal: Pain level will decrease  Description: Pain level will decrease  Outcome: Ongoing     Problem: Pain:  Goal: Control of acute pain  Description: Control of acute pain  Outcome: Ongoing     Problem: Pain:  Goal: Control of chronic pain  Description: Control of chronic pain  Outcome: Ongoing     Problem: Falls - Risk of:  Goal: Will remain free from falls  Description: Will remain free from falls  Outcome: Ongoing     Problem: Falls - Risk of:  Goal: Absence of physical injury  Description: Absence of physical injury  Outcome: Ongoing

## 2021-05-15 NOTE — H&P
Internal Medicine  PGY 1  History & Physical      CC: abdominal bloating     HistoryObtained From:  patient    HISTORY OF PRESENT ILLNESS:  Camacho Saldana is a 57 y.o. female with PMH of EtOH abuse, HTN, DOMENICO, who p/w abdominal bloating. Patient follows Dr. Josefina Washington and was advised to come to the ER for evaluation of progressive abdominal distention and weight gain. Patient states she drinks 1 [place] (3 oz of bourbon/drink) every 4 hours each day. She was diagnosed with spasmodic torticollis and takes Ativan and Ambien every night to help sleep. She found that alcohol helps with her spasms and temporarily reduces her symptoms. She claims she would wake up in the middle of the night to have a drink and go back to sleep. She recently has had her father and brother pass away from liver disease associated with over consumption of alcohol and with the pandemic occurring, she found that drinking alcohol helps her cope. She is concerned about her weight gain over the past several months as she states she does not eat much and is concerned about liver disease and ascites. She endorses right upper quadrant pain 4/10, non-radiating, and not associated with nausea or vomiting. There is no alleviating or aggravating factors. She does has shortness of breath but is being worked up by her cardiologist at [facility]. She denies any chest pain, fever, chills, headaches, jaundice, or changes in bowel/urinary habits.     ED course:   Vitals: Hypertensive 166/98  Exam: Abdominal distention, normal bowel sounds, abdominal tenderness in RUQ, no guarding/rebound; no jaundice; tremor present; neck torticollis present  Labs: Hyponatremic (127), transaminitis (ALT/AST: 73/135)   Imaging: CT abdomen - no acute abnormalities, diffuse hepatic steatosis, galbladder stone & sludge  Treatments: 1 liter NS, Ativan 2mg x2, rally pack x1    Past Medical History:        Diagnosis Date    CAD (coronary artery disease)     Cervical dystonia     CHF [facility]congestive heart failure) ([facility] [address]     Dental crown present     upper right and bonding    Fatty liver     Hyperlipidemia     Hypertension     Lichen sclerosus     PONV (postoperative nausea and vomiting)        Past Surgical History:        Procedure Laterality Date    BREAST SURGERY      COLONOSCOPY      COLONOSCOPY N/A 7/2/2019    COLONOSCOPY WITH BIOPSY performed by Glory Salgado MD at [facility]  7/2/2019    COLONOSCOPY POLYPECTOMY SNARE/COLD BIOPSY performed by Glory Salgado MD at [facility], [place], DIAGNOSTIC         Medications Prior to Admission:    · Medications Prior to Admission: pantoprazole sodium (PROTONIX) 40 MG PACK packet, Take 40 mg by mouth every morning (before breakfast)  · valsartan (DIOVAN) 160 MG tablet, Take 160 mg by mouth daily 1/2 to 1 tablet daily based on BP  · Amoxicillin 500 MG TABS, Take 500 mg by mouth 3 times daily End date: 05/14/21  · polyethyl glycol-propyl glycol 0.4-0.3 % (SYSTANE) 0.4-0.3 % ophthalmic solution, Place 1 drop into both eyes nightly as needed for Dry Eyes  · zolpidem (AMBIEN CR) 12.5 MG extended release tablet, Take 12.5 mg by mouth nightly as needed for Sleep. · TERCONAZOLE VA, Place vaginally  · LORazepam (ATIVAN) 1 MG tablet, Take 2 mg by mouth nightly. ·   Allergies:  Atorvastatin, Iodine, Pravastatin, Rosuvastatin, Shellfish allergy, Sulfa antibiotics, and Tylenol [acetaminophen]    Social History:   · TOBACCO: reports that she has never smoked. She has never used smokeless tobacco.  · ETOH:   reports current alcohol use of about 3.0 standard drinks of alcohol per week. · DRUGS : no illicit drug use  · Patient currently lives with family. Family History:   · History reviewed. No pertinent family history. Review of Systems: A 10 point review of systems was conducted, significant findings as noted in HPI.     Physical Exam  Constitutional: Appearance: Normal appearance. She is overweight. HENT:      Head: Normocephalic and atraumatic. Mouth/Throat:      Mouth: Mucous membranes are moist.      Pharynx: Oropharynx is clear. Neck:      Musculoskeletal: Neck supple. Decreased range of motion. Torticollis present. Cardiovascular:      Rate and Rhythm: Normal rate and regular rhythm. Pulses: Normal pulses. Heart sounds: Normal heart sounds. Pulmonary:      Effort: Pulmonary effort is normal.      Breath sounds: Normal breath sounds. Abdominal:      General: Bowel sounds are normal. There is distension. Palpations: Abdomen is soft. Tenderness: There is abdominal tenderness (RUQ). Skin:     General: Skin is warm and dry. Capillary Refill: Capillary refill takes less than 2 seconds. Coloration: Skin is not jaundiced. Neurological:      General: No focal deficit present. Mental Status: She is alert and oriented to person, place, and time. Mental status is at baseline. Psychiatric:         Mood and Affect: Mood is anxious. Vitals:    05/14/21 1730   BP: (!) 171/97   Pulse: 87   Resp: 18   Temp:    SpO2: 95%       DATA:    Labs:  BMP:   Recent Labs     05/14/21 1818   *   K 4.8   CL 88*   CO2 22   BUN 6*   CREATININE 0.5*   GLUCOSE 100*     CBC:   Recent Labs     05/14/21 1818   WBC 3.8*   HGB 11.2*   HCT 34.1*          LFT's:   Recent Labs     05/14/21 1818   *   ALT 73*   BILITOT 0.7   ALKPHOS 86     Troponin: No results for input(s): TROPONINI in the last 72 hours. BNP: No results for input(s): BNP in the last 72 hours. ABGs: No results for input(s): PHART, NHG1HRM, PO2ART in the last 72 hours. INR: No results for input(s): INR in the last 72 hours.     U/A:  Recent Labs     05/14/21 1818   COLORU Yellow   PHUR 6.0   WBCUA 0-2   RBCUA 0-2   MUCUS 1+*   BACTERIA 2+*   CLARITYU Clear   SPECGRAV 1.025   LEUKOCYTESUR SMALL*   UROBILINOGEN 0.2   BILIRUBINUR Negative BLOODU Negative   GLUCOSEU Negative   AMORPHOUS 2+       CT ABDOMEN PELVIS WO CONTRAST Additional Contrast? None   Final Result      1. No acute abnormality on noncontrast CT of the abdomen and pelvis. 2.  Diffuse hepatic steatosis. Right hemidiaphragmatic elevation. 3.  Gallbladder stone and sludge. 4.  Small fat-containing bilateral inguinal hernias. ASSESSMENT AND PLAN:  Anthony Gavin is a 57 y.o. female who presents with abdominal bloating and was admitted for treatment of alcohol withdrawal.    Alcohol Withdrawal Syndrome, without complication  - [facility] protocol  - Rally pack daily x3  - Librium 25mg QID  - Protonix 40mg IV daily  - Seizure precautions  - NS at 100 cc/hr  - Monitor K & Mg, replete as needed     Hyponatremia  Baseline Na around ~134.  - Continue IVF  - Monitor Na     Alcohol-Associated Fatty Liver Disease  CT abdomen/pelvis: diffuse hepatic steatosis. No evidence of cirrhosis.    - Monitor LFTs  - Counseled patient in the importance of alcohol cessation    Essential Hypertension  - Continue home Valsartan    Spasmodic Torticollis  - Continue Ativan    Generalized Anxiety Disorder  - Continue Ativan  - Needs outpatient follow up for initiating alternative first-line therapy (SSRI/SNRI)      Code Status: Full Code  FEN: IVF: NS; DIET GENERAL;  PPX: Protonix, Lovenox  DISPO: GMF      This patient will be discussed with attending, Shelby Barreto MD.    Pepe Valadez MD, PGY- 1  5/14/2021,  9:57 PM

## 2021-05-15 NOTE — PROGRESS NOTES
Internal Medicine PGY- 3 Resident Progress Note    PCP: Mila Cartagena    Date of Admission: 5/14/2021    Chief Complaint: ETOH abuse, anixety    Subjective: Pt here for ab pain, anxiety; reported that she is feeling anxious still. Has required 10mg of ativan since admission last night. Drinking problem worse since past 1 year after covid. She reported that before covid she only used to drink once a week. Reported that her ab pain is \"cramping\" which is related to her anxiety. Medications:  Reviewed    Infusion Medications    sodium chloride      sodium chloride 100 mL/hr at 05/14/21 2152     Scheduled Medications    thiamine mononitrate  100 mg Oral Daily    chlordiazePOXIDE  50 mg Oral 4x Daily    valsartan  160 mg Oral Daily    sodium chloride flush  5-40 mL Intravenous 2 times per day    enoxaparin  40 mg Subcutaneous Daily    multivitamin  1 tablet Oral Daily    pantoprazole  40 mg Intravenous Daily     PRN Meds: sodium chloride flush, sodium chloride, promethazine **OR** ondansetron, polyethylene glycol, acetaminophen **OR** acetaminophen, LORazepam **OR** LORazepam **OR** LORazepam **OR** LORazepam **OR** LORazepam **OR** LORazepam **OR** LORazepam **OR** LORazepam      Intake/Output Summary (Last 24 hours) at 5/15/2021 1222  Last data filed at 5/15/2021 1207  Gross per 24 hour   Intake 1015.6 ml   Output 900 ml   Net 115.6 ml       Physical Exam Performed:    BP (!) 164/91   Pulse 65   Temp 97.9 °F (36.6 °C) (Oral)   Resp 20   Ht 5' 10\" (1.778 m)   Wt 231 lb 11.3 oz (105.1 kg)   SpO2 96%   BMI 33.25 kg/m²     General appearance: No apparent distress, appears stated age and cooperative. HEENT: Pupils equal, round, and reactive to light. Respiratory:  Normal respiratory effort. Clear to auscultation, bilaterally without Rales/Wheezes/Rhonchi. Cardiovascular: Regular rate and rhythm with normal S1/S2 without murmurs, rubs or gallops.   Abdomen: Soft, non-tender, non-distended with normal bowel sounds. Musculoskeletal: No clubbing, cyanosis or edema bilaterally. Mild tremors of outstretched hands. Neurologic:  Neurovascularly intact without any focal sensory/motor deficits. Cranial nerves: II-XII intact, grossly non-focal.  Psychiatric: Alert and oriented, thought content appropriate, normal insight  Peripheral Pulses: +2 palpable, equal bilaterally     Labs:   Recent Labs     05/14/21 1818 05/15/21  0633   WBC 3.8* 3.2*   HGB 11.2* 10.1*   HCT 34.1* 30.5*    127*     Recent Labs     05/14/21 1818 05/15/21  0633   * 129*   K 4.8 4.2   CL 88* 92*   CO2 22 26   BUN 6* 7   CREATININE 0.5* 0.6   CALCIUM 10.1 9.6     Recent Labs     05/14/21  1818 05/15/21  0633   * 84*   ALT 73* 53*   BILIDIR <0.2  --    BILITOT 0.7 1.2*   ALKPHOS 86 74     No results for input(s): INR in the last 72 hours. No results for input(s): Birder Hof in the last 72 hours. Urinalysis:      Lab Results   Component Value Date    NITRU Negative 05/14/2021    WBCUA 0-2 05/14/2021    BACTERIA 2+ 05/14/2021    RBCUA 0-2 05/14/2021    BLOODU Negative 05/14/2021    SPECGRAV 1.025 05/14/2021    GLUCOSEU Negative 05/14/2021       Radiology:  CT ABDOMEN PELVIS WO CONTRAST Additional Contrast? None   Final Result      1. No acute abnormality on noncontrast CT of the abdomen and pelvis. 2.  Diffuse hepatic steatosis. Right hemidiaphragmatic elevation. 3.  Gallbladder stone and sludge. 4.  Small fat-containing bilateral inguinal hernias. Assessment/Plan:    ETOH Abuse:   Inc to Librium 50 4 times a day  Ativan PRN  Thiamine, Multivitamin  Consult to SW    Ab pain: related to anxiety, uncontrolled GERD  PPI BID w/ meals    Anxiety  Will start on Sertraline    HTN  Cont home meds    DVT Prophylaxis: lovenox  Diet: DIET GENERAL;  Code Status: Full Code    Discussed the patient with MD Los Gould MD  Internal Medicine Resident PGY-3  Contact via [employer]

## 2021-05-15 NOTE — ED NOTES
Pharmacy  Note  - Admission Medication History    List of [id number] medications is complete. I reviewed Rx fill history via \"Complete Dispense Report\" in Epic, and spoke to patient on phone.       The following changes made to hqxij-qu-tbncksyej medication list:    ADDED:   1) amoxicillin 500 mg: END DATE: 05/14/21  2) terconazole: END DATE: 05/14/21  3) Systane eye drops       Please call with questions:  [phone] ([address] [place] [address])    5/14/2021 8:41 PM  Sandrita Alvarez  PharmD Candidate   Class of 2023

## 2021-05-15 NOTE — PROGRESS NOTES
Patient alert and oriented times four. Patient vss this shift. Patient iv is in left forearm. Patient ambulating standby assist. Patient continent of bowel and bladder. Patient has no complaints. Patient adbominal sounds active. Patient skin clean dry and inact. Patient breath sounds clear. Patient bed locked in lowest position with bed alarm on. Call light bedside table and belongings in reach. Patient encouraged to call with any and all needs. Patient verbalizes understanding and call appropriately. Will continue to monitor.

## 2021-05-16 LAB
A/G RATIO: 1.1 (ref 1.1–2.2)
ALBUMIN SERPL-MCNC: 3.7 G/DL (ref 3.4–5)
ALP BLD-CCNC: 66 U/L (ref 40–129)
ALT SERPL-CCNC: 46 U/L (ref 10–40)
ANION GAP SERPL CALCULATED.3IONS-SCNC: 14 MMOL/L (ref 3–16)
AST SERPL-CCNC: 64 U/L (ref 15–37)
BASOPHILS ABSOLUTE: 0 K/UL (ref 0–0.2)
BASOPHILS RELATIVE PERCENT: 1.4 %
BILIRUB SERPL-MCNC: 1 MG/DL (ref 0–1)
BUN BLDV-MCNC: 6 MG/DL (ref 7–20)
CALCIUM SERPL-MCNC: 9.5 MG/DL (ref 8.3–10.6)
CHLORIDE BLD-SCNC: 95 MMOL/L (ref 99–110)
CHOLESTEROL, TOTAL: 218 MG/DL (ref 0–199)
CO2: 22 MMOL/L (ref 21–32)
CREAT SERPL-MCNC: 0.6 MG/DL (ref 0.6–1.2)
EOSINOPHILS ABSOLUTE: 0.2 K/UL (ref 0–0.6)
EOSINOPHILS RELATIVE PERCENT: 4.9 %
GFR AFRICAN AMERICAN: >60
GFR NON-AFRICAN AMERICAN: >60
GLOBULIN: 3.3 G/DL
GLUCOSE BLD-MCNC: 106 MG/DL (ref 70–99)
HCT VFR BLD CALC: 30 % (ref 36–48)
HDLC SERPL-MCNC: 51 MG/DL (ref 40–60)
HEMOGLOBIN: 9.9 G/DL (ref 12–16)
LDL CHOLESTEROL CALCULATED: 138 MG/DL
LYMPHOCYTES ABSOLUTE: 1.2 K/UL (ref 1–5.1)
LYMPHOCYTES RELATIVE PERCENT: 34.4 %
MCH RBC QN AUTO: 29.6 PG (ref 26–34)
MCHC RBC AUTO-ENTMCNC: 33.2 G/DL (ref 31–36)
MCV RBC AUTO: 89.3 FL (ref 80–100)
MONOCYTES ABSOLUTE: 0.4 K/UL (ref 0–1.3)
MONOCYTES RELATIVE PERCENT: 10.2 %
NEUTROPHILS ABSOLUTE: 1.7 K/UL (ref 1.7–7.7)
NEUTROPHILS RELATIVE PERCENT: 49.1 %
PDW BLD-RTO: 18.6 % (ref 12.4–15.4)
PLATELET # BLD: 121 K/UL (ref 135–450)
PMV BLD AUTO: 8.5 FL (ref 5–10.5)
POTASSIUM REFLEX MAGNESIUM: 4 MMOL/L (ref 3.5–5.1)
RBC # BLD: 3.36 M/UL (ref 4–5.2)
SODIUM BLD-SCNC: 131 MMOL/L (ref 136–145)
SODIUM BLD-SCNC: 132 MMOL/L (ref 136–145)
TOTAL PROTEIN: 7 G/DL (ref 6.4–8.2)
TRIGL SERPL-MCNC: 146 MG/DL (ref 0–150)
VLDLC SERPL CALC-MCNC: 29 MG/DL
WBC # BLD: 3.5 K/UL (ref 4–11)

## 2021-05-16 PROCEDURE — 36415 COLL VENOUS BLD VENIPUNCTURE: CPT

## 2021-05-16 PROCEDURE — 6370000000 HC RX 637 (ALT 250 FOR IP): Performed by: STUDENT IN AN ORGANIZED HEALTH CARE EDUCATION/TRAINING PROGRAM

## 2021-05-16 PROCEDURE — 80053 COMPREHEN METABOLIC PANEL: CPT

## 2021-05-16 PROCEDURE — 1200000000 HC SEMI PRIVATE

## 2021-05-16 PROCEDURE — 6370000000 HC RX 637 (ALT 250 FOR IP): Performed by: INTERNAL MEDICINE

## 2021-05-16 PROCEDURE — 2580000003 HC RX 258: Performed by: STUDENT IN AN ORGANIZED HEALTH CARE EDUCATION/TRAINING PROGRAM

## 2021-05-16 PROCEDURE — 84295 ASSAY OF SERUM SODIUM: CPT

## 2021-05-16 PROCEDURE — 85025 COMPLETE CBC W/AUTO DIFF WBC: CPT

## 2021-05-16 PROCEDURE — 6360000002 HC RX W HCPCS: Performed by: STUDENT IN AN ORGANIZED HEALTH CARE EDUCATION/TRAINING PROGRAM

## 2021-05-16 RX ORDER — CHLORDIAZEPOXIDE HYDROCHLORIDE 25 MG/1
25 CAPSULE, GELATIN COATED ORAL 4 TIMES DAILY
Status: DISCONTINUED | OUTPATIENT
Start: 2021-05-16 | End: 2021-05-17

## 2021-05-16 RX ORDER — FAMOTIDINE 20 MG/1
20 TABLET, FILM COATED ORAL 2 TIMES DAILY
Status: DISCONTINUED | OUTPATIENT
Start: 2021-05-16 | End: 2021-05-17 | Stop reason: HOSPADM

## 2021-05-16 RX ADMIN — ENOXAPARIN SODIUM 40 MG: 40 INJECTION SUBCUTANEOUS at 08:44

## 2021-05-16 RX ADMIN — CHLORDIAZEPOXIDE HYDROCHLORIDE 25 MG: 25 CAPSULE ORAL at 21:13

## 2021-05-16 RX ADMIN — THIAMINE HCL TAB 100 MG 100 MG: 100 TAB at 08:44

## 2021-05-16 RX ADMIN — LORAZEPAM 1 MG: 1 TABLET ORAL at 22:12

## 2021-05-16 RX ADMIN — CHLORDIAZEPOXIDE HYDROCHLORIDE 25 MG: 25 CAPSULE ORAL at 12:17

## 2021-05-16 RX ADMIN — FAMOTIDINE 20 MG: 20 TABLET ORAL at 21:13

## 2021-05-16 RX ADMIN — Medication 10 ML: at 21:12

## 2021-05-16 RX ADMIN — LORAZEPAM 1 MG: 1 TABLET ORAL at 00:51

## 2021-05-16 RX ADMIN — PANTOPRAZOLE SODIUM 40 MG: 40 TABLET, DELAYED RELEASE ORAL at 07:12

## 2021-05-16 RX ADMIN — CHLORDIAZEPOXIDE HYDROCHLORIDE 25 MG: 25 CAPSULE ORAL at 17:32

## 2021-05-16 RX ADMIN — VALSARTAN 160 MG: 160 TABLET, FILM COATED ORAL at 08:44

## 2021-05-16 RX ADMIN — CHLORDIAZEPOXIDE HYDROCHLORIDE 50 MG: 25 CAPSULE ORAL at 08:44

## 2021-05-16 ASSESSMENT — PAIN SCALES - GENERAL
PAINLEVEL_OUTOF10: 0

## 2021-05-16 NOTE — PLAN OF CARE
Problem: Pain:  Goal: Pain level will decrease  Description: Pain level will decrease  Note: Denies pain at this time, vitals stable       Problem: Falls - Risk of:  Goal: Will remain free from falls  Description: Will remain free from falls  Note:  Pt free from injury or falls at this time, fall precautions in place, bed in low position, side rail up x2, Orr Fall Risk: High (45 and higher), up with assist, calls with needs, call light in reach, will continue to monitor. Pt verbalizes understanding of fall risk procedures.

## 2021-05-16 NOTE — PLAN OF CARE
Problem: Fluid Volume - Deficit:  Goal: Absence of fluid volume deficit signs and symptoms  Description: Absence of fluid volume deficit signs and symptoms  Outcome: Ongoing  Note: Pt tolerating PO liquid and food. Pt had IV fluids infusing but discontinued this am. Pt continues to have urine output monitored by a hat in her toilet, charted in flowsheets. Problem: Falls - Risk of:  Goal: Will remain free from falls  Description: Will remain free from falls  5/16/2021 1524 by Reyna Schaefer RN  Outcome: Ongoing  Note: Patient is a fall risk. See Fall Risk assessment for details. Bed is in low, lock position; call light/belongings within reach. No attempts to get out of bed have been made, calls appropriately when assistance is needed, camera at bedside as well. Bed alarm and hourly rounds in place for safety. Problem: Anxiety:  Goal: Level of anxiety will decrease  Description: Level of anxiety will decrease  Outcome: Ongoing  Note: Pt reports her anxiety is better today than yesterday. Continues to receive librium scheduled, also has PRN ativan per CIWA scale if needed.

## 2021-05-16 NOTE — PROGRESS NOTES
Internal Medicine PGY- 1 Resident Progress Note    PCP: Vicenta Simms    Date of Admission: 5/14/2021    Chief Complaint: ETOH abuse, anixety    Subjective:   Required 1mg Ativan in the past 24hrs 2/2 increase in Librium, which is improved from 11mg in the past 24hr. Pt seen at the bedside. Continues to report feeling anxious. Endorses mild nausea and queasiness in her stomach after eating this morning. Otherwise denies any CP, vomitin, SOB, dysuria. She did endorse 2 episodes of diarrhea. last night and this morning, no blood. Medications:  Reviewed    Infusion Medications    sodium chloride      sodium chloride 100 mL/hr at 05/15/21 1341     Scheduled Medications    thiamine mononitrate  100 mg Oral Daily    chlordiazePOXIDE  50 mg Oral 4x Daily    sertraline  25 mg Oral Daily    pantoprazole  40 mg Oral BID AC    valsartan  160 mg Oral Daily    sodium chloride flush  5-40 mL Intravenous 2 times per day    enoxaparin  40 mg Subcutaneous Daily    multivitamin  1 tablet Oral Daily     PRN Meds: sodium chloride flush, sodium chloride, promethazine **OR** ondansetron, polyethylene glycol, acetaminophen **OR** acetaminophen, LORazepam **OR** LORazepam **OR** LORazepam **OR** LORazepam **OR** LORazepam **OR** LORazepam **OR** LORazepam **OR** LORazepam      Intake/Output Summary (Last 24 hours) at 5/16/2021 0807  Last data filed at 5/16/2021 5796  Gross per 24 hour   Intake 3438.95 ml   Output 1550 ml   Net 1888.95 ml       Physical Exam Performed:    /80   Pulse 65   Temp 98.2 °F (36.8 °C) (Oral)   Resp 18   Ht 5' 10\" (1.778 m)   Wt 231 lb 11.3 oz (105.1 kg)   SpO2 98%   BMI 33.25 kg/m²     General appearance: No apparent distress, appears stated age and cooperative. HEENT: Pupils equal, round, and reactive to light. Respiratory:  Normal respiratory effort. Clear to auscultation, bilaterally without Rales/Wheezes/Rhonchi.   Cardiovascular: Regular rate and rhythm with normal S1/S2 without murmurs, rubs or gallops. Abdomen: Soft, non-tender, non-distended with normal bowel sounds. Musculoskeletal: No clubbing, cyanosis or edema bilaterally. Mild tremors of outstretched hands. Neurologic:  Neurovascularly intact without any focal sensory/motor deficits. Cranial nerves: II-XII intact, grossly non-focal.  Psychiatric: Alert and oriented, thought content appropriate, normal insight  Peripheral Pulses: +2 palpable, equal bilaterally     Labs:   Recent Labs     05/14/21  1818 05/15/21  0633 05/16/21  0237   WBC 3.8* 3.2* 3.5*   HGB 11.2* 10.1* 9.9*   HCT 34.1* 30.5* 30.0*    127* 121*     Recent Labs     05/14/21  1818 05/15/21  0633 05/15/21  1614 05/15/21  2120 05/16/21  0237   * 129* 126* 130* 131*   K 4.8 4.2  --   --  4.0   CL 88* 92*  --   --  95*   CO2 22 26  --   --  22   BUN 6* 7  --   --  6*   CREATININE 0.5* 0.6  --   --  0.6   CALCIUM 10.1 9.6  --   --  9.5     Recent Labs     05/14/21  1818 05/15/21  0633 05/16/21  0237   * 84* 64*   ALT 73* 53* 46*   BILIDIR <0.2  --   --    BILITOT 0.7 1.2* 1.0   ALKPHOS 86 74 66     No results for input(s): INR in the last 72 hours. No results for input(s): Spencer Ang in the last 72 hours. Urinalysis:      Lab Results   Component Value Date    NITRU Negative 05/14/2021    WBCUA 0-2 05/14/2021    BACTERIA 2+ 05/14/2021    RBCUA 0-2 05/14/2021    BLOODU Negative 05/14/2021    SPECGRAV 1.025 05/14/2021    GLUCOSEU Negative 05/14/2021       Radiology:  CT ABDOMEN PELVIS WO CONTRAST Additional Contrast? None   Final Result      1. No acute abnormality on noncontrast CT of the abdomen and pelvis. 2.  Diffuse hepatic steatosis. Right hemidiaphragmatic elevation. 3.  Gallbladder stone and sludge. 4.  Small fat-containing bilateral inguinal hernias.                Assessment/Plan:    ETOH Abuse:  - Continue Librium 50 4 times a day  - Ativan PRN (1 mg overnight)  - Thiamine, Multivitamin  - Consult to SW  - As pt in 48-72hr window and still symptomatic, will continue to monitor    Abdominal Pain, Diarrhea  Per pt related to anxiety, uncontrolled GERD. Had only 2 episodes of diarrhea. History of abx use prior to admission. No leukocytosis. Low suspicion for Cdiff. - PPI BID switched to Pepcid as pt had a Cdiff toxin sent. - Will not start abx at this time as low suspicion.     Anxiety  - Pt does not want to start sertraline  - On benzos as above    HTN  Cont home meds    DVT Prophylaxis: lovenox  Diet: DIET GENERAL;  Code Status: Full Code    Discussed the patient with MD Kayy Blanco MD, MD  Internal Medicine Resident PGY-1  Contact via [email or web address]

## 2021-05-17 VITALS
WEIGHT: 231.7 LBS | TEMPERATURE: 98.5 F | SYSTOLIC BLOOD PRESSURE: 133 MMHG | OXYGEN SATURATION: 94 % | HEART RATE: 55 BPM | DIASTOLIC BLOOD PRESSURE: 80 MMHG | RESPIRATION RATE: 19 BRPM | HEIGHT: 70 IN | BODY MASS INDEX: 33.17 KG/M2

## 2021-05-17 LAB
A/G RATIO: 1.3 (ref 1.1–2.2)
ALBUMIN SERPL-MCNC: 3.8 G/DL (ref 3.4–5)
ALP BLD-CCNC: 69 U/L (ref 40–129)
ALT SERPL-CCNC: 42 U/L (ref 10–40)
ANION GAP SERPL CALCULATED.3IONS-SCNC: 12 MMOL/L (ref 3–16)
AST SERPL-CCNC: 59 U/L (ref 15–37)
BASOPHILS ABSOLUTE: 0.1 K/UL (ref 0–0.2)
BASOPHILS RELATIVE PERCENT: 2.3 %
BILIRUB SERPL-MCNC: 0.9 MG/DL (ref 0–1)
BUN BLDV-MCNC: 8 MG/DL (ref 7–20)
CALCIUM SERPL-MCNC: 9.7 MG/DL (ref 8.3–10.6)
CHLORIDE BLD-SCNC: 97 MMOL/L (ref 99–110)
CO2: 23 MMOL/L (ref 21–32)
CORTISOL TOTAL: 14.1 UG/DL
CREAT SERPL-MCNC: 0.7 MG/DL (ref 0.6–1.2)
EOSINOPHILS ABSOLUTE: 0.2 K/UL (ref 0–0.6)
EOSINOPHILS RELATIVE PERCENT: 4.9 %
GFR AFRICAN AMERICAN: >60
GFR NON-AFRICAN AMERICAN: >60
GLOBULIN: 2.9 G/DL
GLUCOSE BLD-MCNC: 106 MG/DL (ref 70–99)
HCT VFR BLD CALC: 30.1 % (ref 36–48)
HEMOGLOBIN: 9.9 G/DL (ref 12–16)
LYMPHOCYTES ABSOLUTE: 1.5 K/UL (ref 1–5.1)
LYMPHOCYTES RELATIVE PERCENT: 35.7 %
MCH RBC QN AUTO: 29.4 PG (ref 26–34)
MCHC RBC AUTO-ENTMCNC: 32.8 G/DL (ref 31–36)
MCV RBC AUTO: 89.6 FL (ref 80–100)
MONOCYTES ABSOLUTE: 0.5 K/UL (ref 0–1.3)
MONOCYTES RELATIVE PERCENT: 12.3 %
NEUTROPHILS ABSOLUTE: 1.8 K/UL (ref 1.7–7.7)
NEUTROPHILS RELATIVE PERCENT: 44.8 %
PDW BLD-RTO: 18.6 % (ref 12.4–15.4)
PLATELET # BLD: 131 K/UL (ref 135–450)
PMV BLD AUTO: 9 FL (ref 5–10.5)
POTASSIUM REFLEX MAGNESIUM: 4.4 MMOL/L (ref 3.5–5.1)
RBC # BLD: 3.36 M/UL (ref 4–5.2)
SODIUM BLD-SCNC: 131 MMOL/L (ref 136–145)
SODIUM BLD-SCNC: 132 MMOL/L (ref 136–145)
SODIUM BLD-SCNC: 133 MMOL/L (ref 136–145)
TOTAL PROTEIN: 6.7 G/DL (ref 6.4–8.2)
WBC # BLD: 4.1 K/UL (ref 4–11)

## 2021-05-17 PROCEDURE — 6370000000 HC RX 637 (ALT 250 FOR IP): Performed by: STUDENT IN AN ORGANIZED HEALTH CARE EDUCATION/TRAINING PROGRAM

## 2021-05-17 PROCEDURE — 80053 COMPREHEN METABOLIC PANEL: CPT

## 2021-05-17 PROCEDURE — 85025 COMPLETE CBC W/AUTO DIFF WBC: CPT

## 2021-05-17 PROCEDURE — 6360000002 HC RX W HCPCS: Performed by: STUDENT IN AN ORGANIZED HEALTH CARE EDUCATION/TRAINING PROGRAM

## 2021-05-17 PROCEDURE — 36415 COLL VENOUS BLD VENIPUNCTURE: CPT

## 2021-05-17 PROCEDURE — 2580000003 HC RX 258: Performed by: STUDENT IN AN ORGANIZED HEALTH CARE EDUCATION/TRAINING PROGRAM

## 2021-05-17 PROCEDURE — 82533 TOTAL CORTISOL: CPT

## 2021-05-17 PROCEDURE — 84295 ASSAY OF SERUM SODIUM: CPT

## 2021-05-17 RX ORDER — CHLORDIAZEPOXIDE HYDROCHLORIDE 25 MG/1
25 CAPSULE, GELATIN COATED ORAL 3 TIMES DAILY PRN
Qty: 15 CAPSULE | Refills: 0 | Status: SHIPPED | OUTPATIENT
Start: 2021-05-17 | End: 2021-05-17

## 2021-05-17 RX ORDER — CHLORDIAZEPOXIDE HYDROCHLORIDE 25 MG/1
25 CAPSULE, GELATIN COATED ORAL 3 TIMES DAILY PRN
Qty: 15 CAPSULE | Refills: 0 | Status: SHIPPED | OUTPATIENT
Start: 2021-05-17 | End: 2021-05-22

## 2021-05-17 RX ORDER — CHLORDIAZEPOXIDE HYDROCHLORIDE 25 MG/1
25 CAPSULE, GELATIN COATED ORAL 3 TIMES DAILY
Status: DISCONTINUED | OUTPATIENT
Start: 2021-05-17 | End: 2021-05-17 | Stop reason: HOSPADM

## 2021-05-17 RX ORDER — LORAZEPAM 1 MG/1
2 TABLET ORAL NIGHTLY
Qty: 6 TABLET | Refills: 0 | Status: SHIPPED | OUTPATIENT
Start: 2021-05-17 | End: 2021-05-20

## 2021-05-17 RX ORDER — SERTRALINE HYDROCHLORIDE 25 MG/1
25 TABLET, FILM COATED ORAL DAILY
Qty: 30 TABLET | Refills: 3 | Status: SHIPPED | OUTPATIENT
Start: 2021-05-17 | End: 2021-05-17 | Stop reason: HOSPADM

## 2021-05-17 RX ORDER — CHLORDIAZEPOXIDE HYDROCHLORIDE 25 MG/1
25 CAPSULE, GELATIN COATED ORAL 3 TIMES DAILY PRN
Qty: 30 CAPSULE | Refills: 0 | Status: SHIPPED | OUTPATIENT
Start: 2021-05-17 | End: 2021-05-17

## 2021-05-17 RX ORDER — MULTIVITAMIN WITH IRON
1 TABLET ORAL DAILY
Qty: 30 TABLET | Refills: 0 | Status: SHIPPED | OUTPATIENT
Start: 2021-05-17

## 2021-05-17 RX ORDER — VILAZODONE HYDROCHLORIDE 10 MG/1
10 TABLET ORAL DAILY
Qty: 30 TABLET | Refills: 0 | Status: ON HOLD | OUTPATIENT
Start: 2021-05-17 | End: 2022-02-26

## 2021-05-17 RX ORDER — THIAMINE MONONITRATE (VIT B1) 100 MG
100 TABLET ORAL DAILY
Qty: 30 TABLET | Refills: 0 | Status: SHIPPED | OUTPATIENT
Start: 2021-05-17

## 2021-05-17 RX ADMIN — Medication 5 ML: at 10:24

## 2021-05-17 RX ADMIN — VALSARTAN 160 MG: 160 TABLET, FILM COATED ORAL at 10:18

## 2021-05-17 RX ADMIN — SERTRALINE HYDROCHLORIDE 25 MG: 25 TABLET ORAL at 10:18

## 2021-05-17 RX ADMIN — CHLORDIAZEPOXIDE HYDROCHLORIDE 25 MG: 25 CAPSULE ORAL at 10:32

## 2021-05-17 RX ADMIN — FAMOTIDINE 20 MG: 20 TABLET ORAL at 10:18

## 2021-05-17 RX ADMIN — ENOXAPARIN SODIUM 40 MG: 40 INJECTION SUBCUTANEOUS at 10:20

## 2021-05-17 RX ADMIN — THIAMINE HCL TAB 100 MG 100 MG: 100 TAB at 10:18

## 2021-05-17 RX ADMIN — LORAZEPAM 1 MG: 1 TABLET ORAL at 06:56

## 2021-05-17 ASSESSMENT — PAIN SCALES - GENERAL: PAINLEVEL_OUTOF10: 0

## 2021-05-17 NOTE — DISCHARGE INSTR - COC
Continuity of Care Form    Patient Name: Jacobo Flores   :  1960  MRN:  [id number]    Admit date:  2021  Discharge date:  ***    Code Status Order: Full Code   Advance Directives:   Advance Care Flowsheet Documentation     Date/Time Healthcare Directive Type of Healthcare Directive Copy in [address] Agent's Name Healthcare Agent's Phone Number    21 [phone]  No, patient does not have an advance directive for healthcare treatment -- -- -- -- --          Admitting Physician:  Subha Ryan MD  PCP: [address] [place] [address]    Discharging Nurse: [facility] Unit/Room#: 2273/9503-13  Discharging Unit Phone Number: ***    Emergency Contact:   Extended Emergency Contact Information  Primary Emergency Contact: Paul Ulrich  Address: [address] [place] [address], [address] [place] [address] Phone: [phone]  Work Phone: [phone]  Mobile Phone: [phone]  Relation: Spouse    Past Surgical History:  Past Surgical History:   Procedure Laterality Date    BREAST SURGERY      COLONOSCOPY      COLONOSCOPY N/A 2019    COLONOSCOPY WITH BIOPSY performed by Concepción Alegria MD at [facility]  2019    COLONOSCOPY POLYPECTOMY SNARE/COLD BIOPSY performed by Concepción Alegria MD at [address] [place] [address], [place], DIAGNOSTIC         Immunization History:   Immunization History   Administered Date(s) Administered    COVID-19, SHANTAL Pugh, 100mcg/0.5mL 2021, 2021       Active Problems:  Patient Active Problem List   Diagnosis Code    Alcohol abuse F10.10       Isolation/Infection:   Isolation          C Diff Contact        Patient Infection Status     Infection Onset Added Last Indicated Last Indicated By Review Planned Expiration Resolved Resolved By    C-diff Rule Out  21 Taniya Presley, EDMUND        Indeterminate on 5/15; awaiting PCR Resolved    C-diff Rule Out 05/15/21 05/15/21 05/15/21 Clostridium difficile toxin/antigen (Ordered)   05/15/21 Rule-Out Test Resulted          Nurse Assessment:  Last Vital Signs: /80   Pulse 55   Temp 98.5 °F (36.9 °C) (Oral)   Resp 19   Ht 5' 10\" (1.778 m)   Wt 231 lb 11.3 oz (105.1 kg)   SpO2 94%   BMI 33.25 kg/m²     Last documented pain score (0-10 scale): Pain Level: 0  Last Weight:   Wt Readings from Last 1 Encounters:   05/14/21 231 lb 11.3 oz (105.1 kg)     Mental Status:  {IP PT MENTAL STATUS:20030}    IV Access:  { EMILEE IV ACCESS:657646214}    Nursing Mobility/ADLs:  Walking   {P DME ZKAT:356335083}  Transfer  {P DME UXAJ:442507342}  Bathing  {P DME TDCT:594483957}  Dressing  {P DME FVST:137305783}  Toileting  {P DME LQDT:664242983}  Feeding  {P DME RLII:268543013}  Med Admin  {P DME RWAQ:545176336}  Med Delivery   { EMILEE MED Delivery:185929570}    Wound Care Documentation and Therapy:        Elimination:  Continence:   · Bowel: {YES / UI:89818}  · Bladder: {YES / NW:74479}  Urinary Catheter: {Urinary Catheter:235212918}   Colostomy/Ileostomy/Ileal Conduit: {YES / XV:44409}       Date of Last BM: ***    Intake/Output Summary (Last 24 hours) at 5/17/2021 1108  Last data filed at 5/17/2021 0830  Gross per 24 hour   Intake 900 ml   Output 1550 ml   Net -650 ml     I/O last 3 completed shifts:   In: 1080 [P.O.:1080]  Out: 1450 [Urine:1450]    Safety Concerns:     [employer] Safety Concerns:794372578}    Impairments/Disabilities:      [employer] Impairments/Disabilities:563544773}    Nutrition Therapy:  Current Nutrition Therapy:   [employer] Diet List:108881351}    Routes of Feeding: {P DME Other Feedings:534763488}  Liquids: {Slp liquid thickness:54634}  Daily Fluid Restriction: {CHP DME Yes amt example:246168888}  Last Modified Barium Swallow with Video (Video Swallowing Test): {Done Not Done TXLX:419387387}    Treatments at the Time of Hospital Discharge:   Respiratory Treatments:

## 2021-05-17 NOTE — PROGRESS NOTES
Physician Progress Note      PATIENT:               Scot Das  CSN #:                  [id number]  :                       1960  ADMIT DATE:       2021 4:42 PM  [address] [place] DATE:  RESPONDING  PROVIDER #:        Yanique Gonsalves MD          QUERY TEXT:    Pt admitted with ETOH  withdrawal and hyponatremia. Pt noted to have Serum   NA: 127, OSMO: 270, URINE NA: 82, URINE OSMO: 237. If possible, please   document in the progress notes and discharge summary if you are evaluating and   / or treating any of the following: The medical record reflects the following:  Risk Factors: Alcohol dependence, ? malnutrition  Clinical Indicators: Serum NA: 127, OSMO: 270, URINE NA: 82, URINE OSMO: 237  Treatment: 1L NS Bolus, NS @ 100/hr, IVPB MVI, Thiamine, CIWA protocol  Options provided:  -- SIADH  -- Hyponatremia without SIADH  -- Other - I will add my own diagnosis  -- Disagree - Not applicable / Not valid  -- Disagree - Clinically unable to determine / Unknown  -- Refer to Clinical Documentation Reviewer    PROVIDER RESPONSE TEXT:    Not my patient    Query created by:  Chuckie Chen on 5/15/2021 1:53 PM      Electronically signed by:  Yanique Gonsalves MD 2021 10:30 AM

## 2021-05-17 NOTE — PROGRESS NOTES
Patient discharged to home with spouse, discharge instructions reviewed, patient verbalized understanding. Patient IV removed, belongings taken at time of discharge.

## 2021-05-17 NOTE — DISCHARGE SUMMARY
Hospital Medicine Discharge Summary    Patient ID: Devendra Shah   Gender: female  : 1960   Age: 57 y.o. MRN: [id number]  Code Status: Full Code    Patient's PCP: Mila Cartagena    Admit Date: 2021     Discharge Date: 2021     Admitting Physician: Diamond Jiménez MD     Discharge Physician: Samanta Lai MD     Discharge Diagnoses:    Alcohol use disorder  Abdominal Pain, Diarrhea  Hyponatremia 2/2 SIADH  Alcohol-Associated Fatty Liver Disease  Spasmodic Torticollis  Generalized Anxiety Disorder  Depression  HTN    The patient was seen and examined on day of discharge and this discharge summary is in conjunction with any daily progress note from day of discharge. Hospital Course:  Heather Hart is a 58 yo F with a PMHx Alcohol use disorder, DOMENICO, depression, spasmodic torticollis who presented to the [facility]. for progressive abdominal distention and weight gain. Patient reported that she has been consistently increasing her alcohol consumption due to the [place] pandemic and being stuck at home. Just prior to admission she endorsed drinking 1 [place] (3 oz of bourbon/drink) every 4 hours throughout every day and night (She wakes in the night for her drink). She was admitted for alcohol withdrawal and was found to be hyponatremic as well. She notably runs mildly hyponatremic. She was placed on CIWA protocol with ativan and required up to 11mg in a 24hr period at one point. Pt was started on Librium up-titrated to 50mg QID and then titrated down to 25mg TID before being discharged on the same dose PRN for 5 days. Additionally, pt was discharged with a 3-day supply of Ativan 2mg nightly as she has just ran out of her medication. She takes it due to her torticollis as it helps her sleep. Pt was told that consuming alcohol with librium will likely make her feel very poorly and patient was understanding and stated that she is done with drinking.  Additionally, as pt did not tolerate her Sertraline for her anxiety and depression, she was started on Vilazodone. Hyponatremia likely 2/2 SIADH as pt with low serum osmolality, Uosm in 200's and Татьяна in 80's. Thyroid and cortisol derangements were ruled-out. SIADH cause unknown at this point. Sodium did improve throughout hospital stay off fluids. Patient is to follow up with her PCP in 1 week. On the date of discharge, the patient reported feeling stable. The patient was found to not be in any acute distress, with vital signs within normal limits, and no new abnormalities on physical examination. Further, the patient expressed appropriate understanding of, and agreement with, the discharge recommendations, medications, and plan. Disposition:  Home    Physical Exam Performed:     /80   Pulse 55   Temp 98.5 °F (36.9 °C) (Oral)   Resp 19   Ht 5' 10\" (1.778 m)   Wt 231 lb 11.3 oz (105.1 kg)   SpO2 94%   BMI 33.25 kg/m²       General appearance:  No apparent distress, appears stated age and cooperative. HEENT:  Normal cephalic, atraumatic without obvious deformity. Pupils equal, round, and reactive to light. Extra ocular muscles intact. Conjunctivae/corneas clear. Neck: Supple, with full range of motion. No jugular venous distention. Trachea midline. Respiratory:  Normal respiratory effort. Clear to auscultation, bilaterally without Rales/Wheezes/Rhonchi. Cardiovascular:  Regular rate and rhythm with normal S1/S2 without murmurs, rubs or gallops. Abdomen: Soft, non-tender, non-distended with normal bowel sounds. Musculoskeletal:  No clubbing, cyanosis or edema bilaterally. Full range of motion without deformity. Skin: Skin color, texture, turgor normal.  No rashes or lesions. Neurologic:  Neurovascularly intact without any focal sensory/motor deficits.  Cranial nerves: II-XII intact, grossly non-focal. Mild tremors of outstretched hands - improved  Psychiatric:  Alert and oriented, thought content appropriate, normal insight  Capillary Refill: Brisk,< 3 seconds   Peripheral Pulses: +2 palpable, equal bilaterally       Labs: For convenience and continuity at follow-up the following most recent labs are provided:      CBC:    Lab Results   Component Value Date    WBC 4.1 05/17/2021    HGB 9.9 05/17/2021    HCT 30.1 05/17/2021     05/17/2021       Renal:    Lab Results   Component Value Date     05/17/2021    K 4.4 05/17/2021    CL 97 05/17/2021    CO2 23 05/17/2021    BUN 8 05/17/2021    CREATININE 0.7 05/17/2021    CALCIUM 9.7 05/17/2021         Significant Diagnostic Studies    Radiology:   CT ABDOMEN PELVIS WO CONTRAST Additional Contrast? None   Final Result      1. No acute abnormality on noncontrast CT of the abdomen and pelvis. 2.  Diffuse hepatic steatosis. Right hemidiaphragmatic elevation. 3.  Gallbladder stone and sludge. 4.  Small fat-containing bilateral inguinal hernias. Consults:     IP CONSULT TO HOSPITALIST  IP CONSULT TO SOCIAL WORK    Disposition:  Home     Condition at Discharge: Stable    Discharge Instructions/Follow-up:   Please continue to take Librium as needed for anxiety and tremors and when you feel you need more alcohol. Space them out to every 8hours for up to five days. Take your 2mg ativan nightly as needed.     See your PCP in 1 week for post-hospital admission follow up      Do not continue to drink alcohol. Consuming alcohol with librium will likely make you feel very poorly.     Start taking Vilazodone.     Continue to take your other medications as prescribed. Present back to the hospital for any worsening in symptoms, jitteriness, palpitations or confusion.     Code Status:  Full Code     Activity: activity as tolerated    Diet: regular diet      Discharge Medications:     Discharge Medication List as of 5/17/2021 12:11 PM           Details   Multiple Vitamin (MULTIVITAMIN) TABS tablet Take 1 tablet by mouth daily, Disp-30 tablet, R-0Normal      thiamine

## 2021-05-17 NOTE — CARE COORDINATION
Case Management Assessment            Discharge Note                    Date / Time of Note: 5/17/2021 11:27 AM                  Discharge Note Completed by: MARIA C Hart, ENOCHW    Patient Name: Geoff Islas   YOB: 1960  Diagnosis: Alcohol abuse [F10.10]   Date / Time: 5/14/2021  4:42 PM    Current PCP: Jasmyne Torres patient: No    Hospitalization in the last 30 days: No    Advance Directives:  Code Status: Full Code  [place] DNR form completed and on chart: No    Financial:  Payor: Adriana Head / Plan: [employer]  / Product Type: *No Product type* /      Pharmacy:    [id number] [address] [place] [address] [phone] - F [phone]  [address] [place] [id number]  Phone: [phone] Fax: [phone]      Assistance purchasing medications?: Potential Assistance Purchasing Medications: No  Assistance provided by Case Management: None at this time    Does patient want to participate in local refill/ meds to beds program?: No    Meds To Beds General Rules:  1. Can ONLY be done Monday- Friday between 8:30am-5pm  2. Prescription(s) must be in pharmacy by 3pm to be filled same day  3. Copy of patient's insurance/ prescription drug card and patient face sheet must be sent along with the prescription(s)  4. Cost of Rx cannot be added to hospital bill. If financial assistance is needed, please contact unit  or ;  or  CANNOT provide pharmacy voucher for patients co-pays  5.  Patients can then  the prescription on their way out of the hospital at discharge, or pharmacy can deliver to the bedside if staff is available. (payment due at time of pick-up or delivery - cash, check, or card accepted)     Able to afford home medications/ co-pay costs: Yes    ADLS:  Current PT AM-PAC Score:   /24  Current OT AM-PAC Score:   /24      DISCHARGE Disposition: Home- No Services Needed    LOC at discharge: Not Applicable  EMILEE Completed: Yes    Notification completed in HENS/PAS?:  Not Applicable    IMM Completed:   Not Indicated    Transportation:  Transportation PLAN for discharge: family   Mode of Transport: Private [place] ordered at discharge: No  [address]: Not Applicable  Orders faxed: No    Durable Medical Equipment:  DME Provider: None  Equipment obtained during hospitalization:     Home Oxygen and Respiratory Equipment:  Oxygen needed at discharge?: No  [address]: Not Applicable  Portable tank available for discharge?: No    Dialysis:  Dialysis patient: No    Dialysis Center:  Not Applicable    Additional CM Notes: Pt from home w/ SO, EtOH resources provided at DC, Pt's family will transport home at DC, No needs at DC. The Plan for Transition of Care is related to the following treatment goals of Alcohol abuse [F10.10]    The Patient and/or patient representative Alyssa Ocampo and her family were provided with a choice of provider and agrees with the discharge plan Yes    Freedom of choice list was provided with basic dialogue that supports the patient's individualized plan of care/goals and shares the quality data associated with the providers.  No    Care Transitions patient: No    MARIA C Langston, [facility]  Case Management Department  Ph: [phone]  Fax: [phone]

## 2021-05-17 NOTE — PLAN OF CARE
Problem: Discharge Planning:  Goal: Discharged to appropriate level of care  Description: Discharged to appropriate level of care  Outcome: Completed     Problem: Fluid Volume - Deficit:  Goal: Absence of fluid volume deficit signs and symptoms  Description: Absence of fluid volume deficit signs and symptoms  Outcome: Completed     Problem: Nutrition Deficit:  Goal: Ability to achieve adequate nutritional intake will improve  Description: Ability to achieve adequate nutritional intake will improve  Outcome: Completed     Problem: Sleep Pattern Disturbance:  Goal: Appears well-rested  Description: Appears well-rested  5/17/2021 1104 by Lizzette Aldridge RN  Outcome: Completed  5/16/2021 2319 by Mary Castillo RN  Note: Patient calm and resting after taking 1mg PO ativan     Problem: Violence - Risk of, Self/Other-Directed:  Goal: Knowledge of developmental care interventions  Description: Absence of violence  Outcome: Completed     Problem: Pain:  Goal: Pain level will decrease  Description: Pain level will decrease  5/17/2021 1104 by Lizzette Aldridge RN  Outcome: Completed  5/16/2021 2319 by Mary Castillo RN  Note: Denies pain at this time, vitals stable, assisted to position of comfort. Goal: Control of acute pain  Description: Control of acute pain  Outcome: Completed  Goal: Control of chronic pain  Description: Control of chronic pain  Outcome: Completed     Problem: Falls - Risk of:  Goal: Will remain free from falls  Description: Will remain free from falls  5/17/2021 1104 by Lizzette Aldridge RN  Outcome: Completed  5/16/2021 2319 by Mary Castillo RN  Note:  Pt free from injury or falls at this time, fall precautions in place, bed in low position, side rail up x2, Orr Fall Risk: High (45 and higher), up with assist, calls with needs, call light in reach, will continue to monitor. Pt verbalizes understanding of fall risk procedures.     Goal: Absence of physical injury  Description: Absence of physical injury  Outcome: Completed     Problem: Anxiety:  Goal: Level of anxiety will decrease  Description: Level of anxiety will decrease  5/17/2021 1104 by Mitzi Henning, RN  Outcome: Completed  5/16/2021 2311 by Antonella Dallas RN  Note: Patient continues to have periods of anxiety and restlessness, states she doesn't like to be alone, says ativan is helping with the anxiety

## 2021-05-17 NOTE — PLAN OF CARE
Problem: Falls - Risk of:  Goal: Will remain free from falls  Description: Will remain free from falls  5/16/2021 2319 by Aminta Hernandez RN  Note:  Pt free from injury or falls at this time, fall precautions in place, bed in low position, side rail up x2, Orr Fall Risk: High (45 and higher), up with assist, calls with needs, call light in reach, will continue to monitor. Pt verbalizes understanding of fall risk procedures. Problem: Anxiety:  Goal: Level of anxiety will decrease  Description: Level of anxiety will decrease  5/16/2021 2319 by Aminta Hernandez RN  Note: Patient continues to have periods of anxiety and restlessness, states she doesn't like to be alone, says ativan is helping with the anxiety     Problem: Sleep Pattern Disturbance:  Goal: Appears well-rested  Description: Appears well-rested  Note: Patient calm and resting after taking 1mg PO ativan     Problem: Pain:  Goal: Pain level will decrease  Description: Pain level will decrease  Note: Denies pain at this time, vitals stable, assisted to position of comfort.

## 2021-05-17 NOTE — PROGRESS NOTES
Patient states she feels restless, assisted to walk around the room, vitals remain stable, CIWA score 2, assisted to position of comfort, patient encouraged to call with needs, call light in reach, items within reach, will continue to monitor

## 2021-05-18 LAB
C. DIFFICILE TOXIN MOLECULAR: ABNORMAL
ORGANISM: ABNORMAL

## 2021-05-18 NOTE — PROGRESS NOTES
Physician Progress Note      PATIENT:               Heidy Alonso  CSN #:                  [id number]  :                       1960  ADMIT DATE:       2021 4:42 PM  [address] [place] DATE:        2021 1:30 PM  RESPONDING  PROVIDER #:        Nikita Velazquez MD          QUERY TEXT:    Pt admitted with ETOH  withdrawal and hyponatremia. Pt noted to have Serum   NA: 127, OSMO: 270, URINE NA: 82, URINE OSMO: 237. If possible, please   document in the progress notes and discharge summary if you are evaluating and   / or treating any of the following: The medical record reflects the following:  Risk Factors: Alcohol dependence, ? malnutrition  Clinical Indicators: Serum NA: 127, OSMO: 270, URINE NA: 82, URINE OSMO: 237  Treatment: 1L NS Bolus, NS @ 100/hr, IVPB MVI, Thiamine, CIWA protocol  Options provided:  -- SIADH  -- Hyponatremia without SIADH  -- Other - I will add my own diagnosis  -- Disagree - Not applicable / Not valid  -- Disagree - Clinically unable to determine / Unknown  -- Refer to Clinical Documentation Reviewer    PROVIDER RESPONSE TEXT:    This patient has SIADH.     Query created by: Angy Bunch on 2021 5:56 AM      Electronically signed by:  Nikita Velazquez MD 2021 8:01 AM

## 2021-05-19 RX ORDER — VANCOMYCIN HYDROCHLORIDE 125 MG/1
125 CAPSULE ORAL 4 TIMES DAILY
Qty: 40 CAPSULE | Refills: 0 | Status: SHIPPED | OUTPATIENT
Start: 2021-05-19 | End: 2021-05-29

## 2021-07-01 ENCOUNTER — APPOINTMENT (RX ONLY)
Dept: URBAN - METROPOLITAN AREA CLINIC 170 | Facility: CLINIC | Age: 61
Setting detail: DERMATOLOGY
End: 2021-07-01

## 2021-07-01 DIAGNOSIS — L72.0 EPIDERMAL CYST: ICD-10-CM

## 2021-07-01 PROCEDURE — ? PRESCRIPTION SAMPLES PROVIDED

## 2021-07-01 PROCEDURE — 99212 OFFICE O/P EST SF 10 MIN: CPT

## 2021-07-01 PROCEDURE — ? ADDITIONAL NOTES

## 2021-07-01 PROCEDURE — ? COUNSELING

## 2021-07-01 ASSESSMENT — LOCATION DETAILED DESCRIPTION DERM: LOCATION DETAILED: SUPERIOR MID FOREHEAD

## 2021-07-01 ASSESSMENT — LOCATION ZONE DERM: LOCATION ZONE: FACE

## 2021-07-01 ASSESSMENT — LOCATION SIMPLE DESCRIPTION DERM: LOCATION SIMPLE: SUPERIOR FOREHEAD

## 2021-07-01 NOTE — HPI: BUMPS
How Severe Are Your Bumps?: moderate
Have Your Bumps Been Treated?: not been treated
Is This A New Presentation, Or A Follow-Up?: Bumps
Additional History: She had taken antibiotics for a cracked tooth. She was in the hospital for a procedure and developed C- Diff. Was put on Vancomycin and skin now feels itchy, has bumps and sometimes look like fibrous tissue. Where she had mohs now has veins and seems to be getting worse. Want to know if related to antibiotics. Has been dieting and no alcohol. Lost about 25 lbs.

## 2022-02-24 ENCOUNTER — HOSPITAL ENCOUNTER (INPATIENT)
Age: 62
LOS: 4 days | Discharge: HOME HEALTH | DRG: 897 | End: 2022-03-01
Attending: EMERGENCY MEDICINE | Admitting: INTERNAL MEDICINE
Payer: COMMERCIAL

## 2022-02-24 DIAGNOSIS — F10.932 ALCOHOL WITHDRAWAL SYNDROME WITH PERCEPTUAL DISTURBANCE (HCC): Primary | ICD-10-CM

## 2022-02-24 DIAGNOSIS — S42.294D OTHER CLOSED NONDISPLACED FRACTURE OF PROXIMAL END OF RIGHT HUMERUS WITH ROUTINE HEALING, SUBSEQUENT ENCOUNTER: ICD-10-CM

## 2022-02-24 PROCEDURE — 99285 EMERGENCY DEPT VISIT HI MDM: CPT

## 2022-02-25 ENCOUNTER — APPOINTMENT (OUTPATIENT)
Dept: GENERAL RADIOLOGY | Age: 62
DRG: 897 | End: 2022-02-25
Payer: COMMERCIAL

## 2022-02-25 PROBLEM — F10.239 ALCOHOL DEPENDENCE WITH WITHDRAWAL (HCC): Status: ACTIVE | Noted: 2022-02-25

## 2022-02-25 LAB
A/G RATIO: 1.2 (ref 1.1–2.2)
A/G RATIO: 1.4 (ref 1.1–2.2)
ALBUMIN SERPL-MCNC: 4.2 G/DL (ref 3.4–5)
ALBUMIN SERPL-MCNC: 4.3 G/DL (ref 3.4–5)
ALP BLD-CCNC: 87 U/L (ref 40–129)
ALP BLD-CCNC: 93 U/L (ref 40–129)
ALT SERPL-CCNC: 32 U/L (ref 10–40)
ALT SERPL-CCNC: 35 U/L (ref 10–40)
AMMONIA: 59 UMOL/L (ref 11–51)
ANION GAP SERPL CALCULATED.3IONS-SCNC: 13 MMOL/L (ref 3–16)
ANION GAP SERPL CALCULATED.3IONS-SCNC: 16 MMOL/L (ref 3–16)
AST SERPL-CCNC: 79 U/L (ref 15–37)
AST SERPL-CCNC: 92 U/L (ref 15–37)
BASE EXCESS VENOUS: 1.3 MMOL/L (ref -2–3)
BASOPHILS ABSOLUTE: 0 K/UL (ref 0–0.2)
BASOPHILS ABSOLUTE: 0 K/UL (ref 0–0.2)
BASOPHILS RELATIVE PERCENT: 0.2 %
BASOPHILS RELATIVE PERCENT: 0.6 %
BILIRUB SERPL-MCNC: 1.1 MG/DL (ref 0–1)
BILIRUB SERPL-MCNC: 1.1 MG/DL (ref 0–1)
BILIRUBIN DIRECT: 0.3 MG/DL (ref 0–0.3)
BILIRUBIN, INDIRECT: 0.8 MG/DL (ref 0–1)
BUN BLDV-MCNC: 8 MG/DL (ref 7–20)
BUN BLDV-MCNC: 8 MG/DL (ref 7–20)
CALCIUM SERPL-MCNC: 10.1 MG/DL (ref 8.3–10.6)
CALCIUM SERPL-MCNC: 9.9 MG/DL (ref 8.3–10.6)
CARBOXYHEMOGLOBIN: 1.7 % (ref 0–1.5)
CHLORIDE BLD-SCNC: 92 MMOL/L (ref 99–110)
CHLORIDE BLD-SCNC: 93 MMOL/L (ref 99–110)
CO2: 22 MMOL/L (ref 21–32)
CO2: 23 MMOL/L (ref 21–32)
CREAT SERPL-MCNC: <0.5 MG/DL (ref 0.6–1.2)
CREAT SERPL-MCNC: <0.5 MG/DL (ref 0.6–1.2)
EKG ATRIAL RATE: 82 BPM
EKG DIAGNOSIS: NORMAL
EKG P AXIS: 9 DEGREES
EKG P-R INTERVAL: 164 MS
EKG Q-T INTERVAL: 380 MS
EKG QRS DURATION: 96 MS
EKG QTC CALCULATION (BAZETT): 443 MS
EKG R AXIS: 27 DEGREES
EKG T AXIS: 47 DEGREES
EKG VENTRICULAR RATE: 82 BPM
EOSINOPHILS ABSOLUTE: 0 K/UL (ref 0–0.6)
EOSINOPHILS ABSOLUTE: 0 K/UL (ref 0–0.6)
EOSINOPHILS RELATIVE PERCENT: 0 %
EOSINOPHILS RELATIVE PERCENT: 0.1 %
ETHANOL: NORMAL MG/DL (ref 0–0.08)
GFR AFRICAN AMERICAN: >60
GFR AFRICAN AMERICAN: >60
GFR NON-AFRICAN AMERICAN: >60
GFR NON-AFRICAN AMERICAN: >60
GLUCOSE BLD-MCNC: 112 MG/DL (ref 70–99)
GLUCOSE BLD-MCNC: 159 MG/DL (ref 70–99)
HCO3 VENOUS: 25.9 MMOL/L (ref 24–28)
HCT VFR BLD CALC: 29.8 % (ref 36–48)
HCT VFR BLD CALC: 32.3 % (ref 36–48)
HEMOGLOBIN, VEN, REDUCED: 19.3 %
HEMOGLOBIN: 10.8 G/DL (ref 12–16)
HEMOGLOBIN: 9.7 G/DL (ref 12–16)
LACTIC ACID: 2.3 MMOL/L (ref 0.4–2)
LIPASE: 49 U/L (ref 13–60)
LYMPHOCYTES ABSOLUTE: 0.4 K/UL (ref 1–5.1)
LYMPHOCYTES ABSOLUTE: 0.8 K/UL (ref 1–5.1)
LYMPHOCYTES RELATIVE PERCENT: 13.1 %
LYMPHOCYTES RELATIVE PERCENT: 5.4 %
MCH RBC QN AUTO: 27.3 PG (ref 26–34)
MCH RBC QN AUTO: 27.5 PG (ref 26–34)
MCHC RBC AUTO-ENTMCNC: 32.5 G/DL (ref 31–36)
MCHC RBC AUTO-ENTMCNC: 33.3 G/DL (ref 31–36)
MCV RBC AUTO: 82.6 FL (ref 80–100)
MCV RBC AUTO: 83.8 FL (ref 80–100)
METHEMOGLOBIN VENOUS: 0.4 % (ref 0–1.5)
MONOCYTES ABSOLUTE: 0.5 K/UL (ref 0–1.3)
MONOCYTES ABSOLUTE: 0.6 K/UL (ref 0–1.3)
MONOCYTES RELATIVE PERCENT: 7.4 %
MONOCYTES RELATIVE PERCENT: 8.8 %
NEUTROPHILS ABSOLUTE: 4.7 K/UL (ref 1.7–7.7)
NEUTROPHILS ABSOLUTE: 6.5 K/UL (ref 1.7–7.7)
NEUTROPHILS RELATIVE PERCENT: 77.4 %
NEUTROPHILS RELATIVE PERCENT: 87 %
O2 SAT, VEN: 80 %
PCO2, VEN: 40.2 MMHG (ref 41–51)
PDW BLD-RTO: 19.3 % (ref 12.4–15.4)
PDW BLD-RTO: 19.6 % (ref 12.4–15.4)
PH VENOUS: 7.42 (ref 7.35–7.45)
PLATELET # BLD: 132 K/UL (ref 135–450)
PLATELET # BLD: 169 K/UL (ref 135–450)
PMV BLD AUTO: 7.7 FL (ref 5–10.5)
PMV BLD AUTO: 8.3 FL (ref 5–10.5)
PO2, VEN: 47.2 MMHG (ref 25–40)
POTASSIUM REFLEX MAGNESIUM: 3.8 MMOL/L (ref 3.5–5.1)
POTASSIUM REFLEX MAGNESIUM: 4 MMOL/L (ref 3.5–5.1)
PRO-BNP: 63 PG/ML (ref 0–124)
RBC # BLD: 3.56 M/UL (ref 4–5.2)
RBC # BLD: 3.91 M/UL (ref 4–5.2)
SODIUM BLD-SCNC: 129 MMOL/L (ref 136–145)
SODIUM BLD-SCNC: 130 MMOL/L (ref 136–145)
TCO2 CALC VENOUS: 27 MMOL/L
TOTAL PROTEIN: 7.3 G/DL (ref 6.4–8.2)
TOTAL PROTEIN: 7.9 G/DL (ref 6.4–8.2)
TROPONIN: <0.01 NG/ML
TROPONIN: <0.01 NG/ML
WBC # BLD: 6.1 K/UL (ref 4–11)
WBC # BLD: 7.4 K/UL (ref 4–11)

## 2022-02-25 PROCEDURE — 6370000000 HC RX 637 (ALT 250 FOR IP): Performed by: EMERGENCY MEDICINE

## 2022-02-25 PROCEDURE — 36415 COLL VENOUS BLD VENIPUNCTURE: CPT

## 2022-02-25 PROCEDURE — 71045 X-RAY EXAM CHEST 1 VIEW: CPT

## 2022-02-25 PROCEDURE — 97530 THERAPEUTIC ACTIVITIES: CPT

## 2022-02-25 PROCEDURE — HZ2ZZZZ DETOXIFICATION SERVICES FOR SUBSTANCE ABUSE TREATMENT: ICD-10-PCS | Performed by: INTERNAL MEDICINE

## 2022-02-25 PROCEDURE — 82803 BLOOD GASES ANY COMBINATION: CPT

## 2022-02-25 PROCEDURE — 6360000002 HC RX W HCPCS: Performed by: PHYSICIAN ASSISTANT

## 2022-02-25 PROCEDURE — 82140 ASSAY OF AMMONIA: CPT

## 2022-02-25 PROCEDURE — 83690 ASSAY OF LIPASE: CPT

## 2022-02-25 PROCEDURE — 6370000000 HC RX 637 (ALT 250 FOR IP): Performed by: INTERNAL MEDICINE

## 2022-02-25 PROCEDURE — 6360000002 HC RX W HCPCS: Performed by: EMERGENCY MEDICINE

## 2022-02-25 PROCEDURE — 93010 ELECTROCARDIOGRAM REPORT: CPT | Performed by: INTERNAL MEDICINE

## 2022-02-25 PROCEDURE — 97535 SELF CARE MNGMENT TRAINING: CPT

## 2022-02-25 PROCEDURE — 2500000003 HC RX 250 WO HCPCS: Performed by: EMERGENCY MEDICINE

## 2022-02-25 PROCEDURE — 80053 COMPREHEN METABOLIC PANEL: CPT

## 2022-02-25 PROCEDURE — 97116 GAIT TRAINING THERAPY: CPT

## 2022-02-25 PROCEDURE — 84484 ASSAY OF TROPONIN QUANT: CPT

## 2022-02-25 PROCEDURE — 2580000003 HC RX 258: Performed by: EMERGENCY MEDICINE

## 2022-02-25 PROCEDURE — 85025 COMPLETE CBC W/AUTO DIFF WBC: CPT

## 2022-02-25 PROCEDURE — 82077 ASSAY SPEC XCP UR&BREATH IA: CPT

## 2022-02-25 PROCEDURE — 6360000002 HC RX W HCPCS: Performed by: INTERNAL MEDICINE

## 2022-02-25 PROCEDURE — 93005 ELECTROCARDIOGRAM TRACING: CPT | Performed by: INTERNAL MEDICINE

## 2022-02-25 PROCEDURE — 83880 ASSAY OF NATRIURETIC PEPTIDE: CPT

## 2022-02-25 PROCEDURE — 83605 ASSAY OF LACTIC ACID: CPT

## 2022-02-25 PROCEDURE — 2060000000 HC ICU INTERMEDIATE R&B

## 2022-02-25 PROCEDURE — 96375 TX/PRO/DX INJ NEW DRUG ADDON: CPT

## 2022-02-25 PROCEDURE — 97166 OT EVAL MOD COMPLEX 45 MIN: CPT

## 2022-02-25 PROCEDURE — 96374 THER/PROPH/DIAG INJ IV PUSH: CPT

## 2022-02-25 PROCEDURE — 2580000003 HC RX 258: Performed by: INTERNAL MEDICINE

## 2022-02-25 PROCEDURE — 96376 TX/PRO/DX INJ SAME DRUG ADON: CPT

## 2022-02-25 PROCEDURE — 97162 PT EVAL MOD COMPLEX 30 MIN: CPT

## 2022-02-25 RX ORDER — KETOROLAC TROMETHAMINE 30 MG/ML
15 INJECTION, SOLUTION INTRAMUSCULAR; INTRAVENOUS ONCE
Status: COMPLETED | OUTPATIENT
Start: 2022-02-25 | End: 2022-02-25

## 2022-02-25 RX ORDER — OXYCODONE HYDROCHLORIDE 5 MG/1
5 TABLET ORAL EVERY 4 HOURS PRN
Status: DISCONTINUED | OUTPATIENT
Start: 2022-02-25 | End: 2022-02-25

## 2022-02-25 RX ORDER — LORAZEPAM 2 MG/ML
2 INJECTION INTRAMUSCULAR ONCE
Status: COMPLETED | OUTPATIENT
Start: 2022-02-25 | End: 2022-02-25

## 2022-02-25 RX ORDER — KETOROLAC TROMETHAMINE 30 MG/ML
30 INJECTION, SOLUTION INTRAMUSCULAR; INTRAVENOUS EVERY 6 HOURS PRN
Status: DISCONTINUED | OUTPATIENT
Start: 2022-02-25 | End: 2022-02-25

## 2022-02-25 RX ORDER — MULTIVITAMIN WITH IRON
1 TABLET ORAL DAILY
Status: DISCONTINUED | OUTPATIENT
Start: 2022-02-25 | End: 2022-03-01 | Stop reason: HOSPADM

## 2022-02-25 RX ORDER — VALSARTAN 160 MG/1
160 TABLET ORAL DAILY
Status: DISCONTINUED | OUTPATIENT
Start: 2022-02-25 | End: 2022-03-01 | Stop reason: HOSPADM

## 2022-02-25 RX ORDER — PANTOPRAZOLE SODIUM 40 MG/1
40 TABLET, DELAYED RELEASE ORAL
Status: DISCONTINUED | OUTPATIENT
Start: 2022-02-25 | End: 2022-03-01 | Stop reason: HOSPADM

## 2022-02-25 RX ORDER — ONDANSETRON 2 MG/ML
4 INJECTION INTRAMUSCULAR; INTRAVENOUS EVERY 6 HOURS PRN
Status: DISCONTINUED | OUTPATIENT
Start: 2022-02-25 | End: 2022-03-01 | Stop reason: HOSPADM

## 2022-02-25 RX ORDER — ACETAMINOPHEN 325 MG/1
650 TABLET ORAL EVERY 6 HOURS PRN
Status: DISCONTINUED | OUTPATIENT
Start: 2022-02-25 | End: 2022-03-01 | Stop reason: HOSPADM

## 2022-02-25 RX ORDER — LORAZEPAM 1 MG/1
3 TABLET ORAL
Status: DISCONTINUED | OUTPATIENT
Start: 2022-02-25 | End: 2022-03-01

## 2022-02-25 RX ORDER — LORAZEPAM 2 MG/ML
4 INJECTION INTRAMUSCULAR
Status: DISCONTINUED | OUTPATIENT
Start: 2022-02-25 | End: 2022-03-01

## 2022-02-25 RX ORDER — SODIUM CHLORIDE 0.9 % (FLUSH) 0.9 %
5-40 SYRINGE (ML) INJECTION PRN
Status: DISCONTINUED | OUTPATIENT
Start: 2022-02-25 | End: 2022-03-01 | Stop reason: HOSPADM

## 2022-02-25 RX ORDER — CHLORDIAZEPOXIDE HYDROCHLORIDE 25 MG/1
25 CAPSULE, GELATIN COATED ORAL ONCE
Status: COMPLETED | OUTPATIENT
Start: 2022-02-25 | End: 2022-02-25

## 2022-02-25 RX ORDER — SODIUM CHLORIDE 0.9 % (FLUSH) 0.9 %
10 SYRINGE (ML) INJECTION PRN
Status: DISCONTINUED | OUTPATIENT
Start: 2022-02-25 | End: 2022-03-01 | Stop reason: HOSPADM

## 2022-02-25 RX ORDER — SODIUM CHLORIDE 0.9 % (FLUSH) 0.9 %
5-40 SYRINGE (ML) INJECTION EVERY 12 HOURS SCHEDULED
Status: DISCONTINUED | OUTPATIENT
Start: 2022-02-25 | End: 2022-03-01 | Stop reason: HOSPADM

## 2022-02-25 RX ORDER — SODIUM CHLORIDE 9 MG/ML
25 INJECTION, SOLUTION INTRAVENOUS PRN
Status: DISCONTINUED | OUTPATIENT
Start: 2022-02-25 | End: 2022-03-01 | Stop reason: HOSPADM

## 2022-02-25 RX ORDER — KETOROLAC TROMETHAMINE 15 MG/ML
15 INJECTION, SOLUTION INTRAMUSCULAR; INTRAVENOUS EVERY 6 HOURS PRN
Status: DISPENSED | OUTPATIENT
Start: 2022-02-25 | End: 2022-02-28

## 2022-02-25 RX ORDER — LORAZEPAM 2 MG/ML
1 INJECTION INTRAMUSCULAR
Status: DISCONTINUED | OUTPATIENT
Start: 2022-02-25 | End: 2022-03-01

## 2022-02-25 RX ORDER — ACETAMINOPHEN 650 MG/1
650 SUPPOSITORY RECTAL EVERY 6 HOURS PRN
Status: DISCONTINUED | OUTPATIENT
Start: 2022-02-25 | End: 2022-02-25

## 2022-02-25 RX ORDER — LIDOCAINE 4 G/G
1 PATCH TOPICAL DAILY
Status: DISCONTINUED | OUTPATIENT
Start: 2022-02-25 | End: 2022-03-01 | Stop reason: HOSPADM

## 2022-02-25 RX ORDER — OXYCODONE HYDROCHLORIDE 5 MG/1
10 TABLET ORAL EVERY 4 HOURS PRN
Status: COMPLETED | OUTPATIENT
Start: 2022-02-25 | End: 2022-02-25

## 2022-02-25 RX ORDER — LORAZEPAM 2 MG/ML
2 INJECTION INTRAMUSCULAR
Status: DISCONTINUED | OUTPATIENT
Start: 2022-02-25 | End: 2022-03-01

## 2022-02-25 RX ORDER — MORPHINE SULFATE 2 MG/ML
2 INJECTION, SOLUTION INTRAMUSCULAR; INTRAVENOUS EVERY 6 HOURS PRN
Status: DISCONTINUED | OUTPATIENT
Start: 2022-02-25 | End: 2022-02-26

## 2022-02-25 RX ORDER — SODIUM CHLORIDE 0.9 % (FLUSH) 0.9 %
10 SYRINGE (ML) INJECTION EVERY 12 HOURS SCHEDULED
Status: DISCONTINUED | OUTPATIENT
Start: 2022-02-25 | End: 2022-03-01 | Stop reason: HOSPADM

## 2022-02-25 RX ORDER — LORAZEPAM 2 MG/ML
3 INJECTION INTRAMUSCULAR
Status: DISCONTINUED | OUTPATIENT
Start: 2022-02-25 | End: 2022-03-01

## 2022-02-25 RX ORDER — LORAZEPAM 1 MG/1
1 TABLET ORAL
Status: DISCONTINUED | OUTPATIENT
Start: 2022-02-25 | End: 2022-03-01

## 2022-02-25 RX ORDER — GAUZE BANDAGE 2" X 2"
100 BANDAGE TOPICAL DAILY
Status: DISCONTINUED | OUTPATIENT
Start: 2022-02-25 | End: 2022-03-01 | Stop reason: HOSPADM

## 2022-02-25 RX ORDER — MAGNESIUM SULFATE IN WATER 40 MG/ML
2000 INJECTION, SOLUTION INTRAVENOUS PRN
Status: DISCONTINUED | OUTPATIENT
Start: 2022-02-25 | End: 2022-03-01 | Stop reason: HOSPADM

## 2022-02-25 RX ORDER — LORAZEPAM 1 MG/1
2 TABLET ORAL
Status: DISCONTINUED | OUTPATIENT
Start: 2022-02-25 | End: 2022-03-01

## 2022-02-25 RX ORDER — PROMETHAZINE HYDROCHLORIDE 25 MG/1
12.5 TABLET ORAL EVERY 6 HOURS PRN
Status: DISCONTINUED | OUTPATIENT
Start: 2022-02-25 | End: 2022-03-01 | Stop reason: HOSPADM

## 2022-02-25 RX ORDER — CHLORDIAZEPOXIDE HYDROCHLORIDE 5 MG/1
10 CAPSULE, GELATIN COATED ORAL 3 TIMES DAILY
Status: DISCONTINUED | OUTPATIENT
Start: 2022-02-25 | End: 2022-03-01

## 2022-02-25 RX ORDER — ONDANSETRON 2 MG/ML
4 INJECTION INTRAMUSCULAR; INTRAVENOUS ONCE
Status: COMPLETED | OUTPATIENT
Start: 2022-02-25 | End: 2022-02-25

## 2022-02-25 RX ORDER — SODIUM CHLORIDE, SODIUM LACTATE, POTASSIUM CHLORIDE, AND CALCIUM CHLORIDE .6; .31; .03; .02 G/100ML; G/100ML; G/100ML; G/100ML
1000 INJECTION, SOLUTION INTRAVENOUS ONCE
Status: COMPLETED | OUTPATIENT
Start: 2022-02-25 | End: 2022-02-25

## 2022-02-25 RX ORDER — OXYCODONE HYDROCHLORIDE 5 MG/1
5 TABLET ORAL EVERY 4 HOURS PRN
Status: COMPLETED | OUTPATIENT
Start: 2022-02-25 | End: 2022-02-26

## 2022-02-25 RX ORDER — LORAZEPAM 1 MG/1
4 TABLET ORAL
Status: DISCONTINUED | OUTPATIENT
Start: 2022-02-25 | End: 2022-03-01

## 2022-02-25 RX ORDER — POTASSIUM CHLORIDE 7.45 MG/ML
10 INJECTION INTRAVENOUS PRN
Status: DISCONTINUED | OUTPATIENT
Start: 2022-02-25 | End: 2022-03-01 | Stop reason: HOSPADM

## 2022-02-25 RX ADMIN — OXYCODONE HYDROCHLORIDE 10 MG: 5 TABLET ORAL at 13:57

## 2022-02-25 RX ADMIN — KETOROLAC TROMETHAMINE 15 MG: 30 INJECTION, SOLUTION INTRAMUSCULAR at 00:21

## 2022-02-25 RX ADMIN — LORAZEPAM 2 MG: 2 INJECTION INTRAMUSCULAR; INTRAVENOUS at 00:21

## 2022-02-25 RX ADMIN — VALSARTAN 160 MG: 160 TABLET, FILM COATED ORAL at 08:36

## 2022-02-25 RX ADMIN — CHLORDIAZEPOXIDE HYDROCHLORIDE 10 MG: 5 CAPSULE ORAL at 08:31

## 2022-02-25 RX ADMIN — KETOROLAC TROMETHAMINE 15 MG: 15 INJECTION, SOLUTION INTRAMUSCULAR; INTRAVENOUS at 18:01

## 2022-02-25 RX ADMIN — CHLORDIAZEPOXIDE HYDROCHLORIDE 10 MG: 5 CAPSULE ORAL at 13:57

## 2022-02-25 RX ADMIN — LORAZEPAM 4 MG: 2 INJECTION INTRAMUSCULAR; INTRAVENOUS at 03:18

## 2022-02-25 RX ADMIN — PANTOPRAZOLE SODIUM 40 MG: 40 TABLET, DELAYED RELEASE ORAL at 05:51

## 2022-02-25 RX ADMIN — LORAZEPAM 2 MG: 2 INJECTION INTRAMUSCULAR; INTRAVENOUS at 15:41

## 2022-02-25 RX ADMIN — THIAMINE HYDROCHLORIDE: 100 INJECTION, SOLUTION INTRAMUSCULAR; INTRAVENOUS at 02:26

## 2022-02-25 RX ADMIN — SODIUM CHLORIDE, PRESERVATIVE FREE 10 ML: 5 INJECTION INTRAVENOUS at 08:33

## 2022-02-25 RX ADMIN — LORAZEPAM 4 MG: 2 INJECTION INTRAMUSCULAR; INTRAVENOUS at 22:36

## 2022-02-25 RX ADMIN — MULTIVITAMIN TABLET 1 TABLET: TABLET at 08:31

## 2022-02-25 RX ADMIN — LORAZEPAM 2 MG: 2 INJECTION INTRAMUSCULAR; INTRAVENOUS at 01:16

## 2022-02-25 RX ADMIN — OXYCODONE HYDROCHLORIDE 10 MG: 5 TABLET ORAL at 10:04

## 2022-02-25 RX ADMIN — LORAZEPAM 3 MG: 2 INJECTION INTRAMUSCULAR; INTRAVENOUS at 10:19

## 2022-02-25 RX ADMIN — LORAZEPAM 3 MG: 2 INJECTION INTRAMUSCULAR; INTRAVENOUS at 19:58

## 2022-02-25 RX ADMIN — OXYCODONE 5 MG: 5 TABLET ORAL at 22:36

## 2022-02-25 RX ADMIN — OXYCODONE 5 MG: 5 TABLET ORAL at 19:06

## 2022-02-25 RX ADMIN — ONDANSETRON 4 MG: 2 INJECTION INTRAMUSCULAR; INTRAVENOUS at 19:09

## 2022-02-25 RX ADMIN — CHLORDIAZEPOXIDE HYDROCHLORIDE 25 MG: 25 CAPSULE ORAL at 03:18

## 2022-02-25 RX ADMIN — SODIUM CHLORIDE, PRESERVATIVE FREE 10 ML: 5 INJECTION INTRAVENOUS at 19:58

## 2022-02-25 RX ADMIN — LORAZEPAM 4 MG: 2 INJECTION INTRAMUSCULAR; INTRAVENOUS at 04:36

## 2022-02-25 RX ADMIN — ONDANSETRON 4 MG: 2 INJECTION INTRAMUSCULAR; INTRAVENOUS at 00:21

## 2022-02-25 RX ADMIN — SODIUM CHLORIDE, POTASSIUM CHLORIDE, SODIUM LACTATE AND CALCIUM CHLORIDE 1000 ML: 600; 310; 30; 20 INJECTION, SOLUTION INTRAVENOUS at 01:16

## 2022-02-25 RX ADMIN — THIAMINE HCL TAB 100 MG 100 MG: 100 TAB at 08:31

## 2022-02-25 RX ADMIN — ONDANSETRON 4 MG: 2 INJECTION INTRAMUSCULAR; INTRAVENOUS at 08:36

## 2022-02-25 RX ADMIN — OXYCODONE HYDROCHLORIDE 10 MG: 5 TABLET ORAL at 03:42

## 2022-02-25 RX ADMIN — LORAZEPAM 4 MG: 2 INJECTION INTRAMUSCULAR; INTRAVENOUS at 08:32

## 2022-02-25 RX ADMIN — ENOXAPARIN SODIUM 40 MG: 100 INJECTION SUBCUTANEOUS at 08:41

## 2022-02-25 RX ADMIN — CHLORDIAZEPOXIDE HYDROCHLORIDE 10 MG: 5 CAPSULE ORAL at 19:58

## 2022-02-25 RX ADMIN — LORAZEPAM 4 MG: 2 INJECTION INTRAMUSCULAR; INTRAVENOUS at 05:51

## 2022-02-25 ASSESSMENT — PAIN DESCRIPTION - PROGRESSION
CLINICAL_PROGRESSION: GRADUALLY WORSENING
CLINICAL_PROGRESSION: NOT CHANGED
CLINICAL_PROGRESSION: RAPIDLY WORSENING

## 2022-02-25 ASSESSMENT — PAIN DESCRIPTION - PAIN TYPE
TYPE: ACUTE PAIN

## 2022-02-25 ASSESSMENT — PAIN DESCRIPTION - LOCATION
LOCATION: ARM
LOCATION: ARM;SHOULDER
LOCATION: ARM

## 2022-02-25 ASSESSMENT — PAIN DESCRIPTION - FREQUENCY
FREQUENCY: CONTINUOUS

## 2022-02-25 ASSESSMENT — PAIN DESCRIPTION - DESCRIPTORS
DESCRIPTORS: ACHING;DISCOMFORT;BURNING
DESCRIPTORS: ACHING;DISCOMFORT
DESCRIPTORS: DISCOMFORT
DESCRIPTORS: DISCOMFORT
DESCRIPTORS: DISCOMFORT;SHARP
DESCRIPTORS: DISCOMFORT

## 2022-02-25 ASSESSMENT — ENCOUNTER SYMPTOMS
DIARRHEA: 0
CONSTIPATION: 0
EYE DISCHARGE: 0
FACIAL SWELLING: 0
CHEST TIGHTNESS: 0
SORE THROAT: 0
SHORTNESS OF BREATH: 0
COUGH: 0
NAUSEA: 1
VOMITING: 0
WHEEZING: 0

## 2022-02-25 ASSESSMENT — PAIN DESCRIPTION - ONSET
ONSET: ON-GOING

## 2022-02-25 ASSESSMENT — PAIN DESCRIPTION - ORIENTATION
ORIENTATION: RIGHT

## 2022-02-25 ASSESSMENT — PAIN SCALES - GENERAL
PAINLEVEL_OUTOF10: 0
PAINLEVEL_OUTOF10: 10
PAINLEVEL_OUTOF10: 9
PAINLEVEL_OUTOF10: 10
PAINLEVEL_OUTOF10: 8
PAINLEVEL_OUTOF10: 10
PAINLEVEL_OUTOF10: 10
PAINLEVEL_OUTOF10: 6
PAINLEVEL_OUTOF10: 10
PAINLEVEL_OUTOF10: 10
PAINLEVEL_OUTOF10: 6
PAINLEVEL_OUTOF10: 10
PAINLEVEL_OUTOF10: 5
PAINLEVEL_OUTOF10: 5

## 2022-02-25 ASSESSMENT — PAIN - FUNCTIONAL ASSESSMENT
PAIN_FUNCTIONAL_ASSESSMENT: PREVENTS OR INTERFERES WITH MANY ACTIVE NOT PASSIVE ACTIVITIES
PAIN_FUNCTIONAL_ASSESSMENT: PREVENTS OR INTERFERES WITH MANY ACTIVE NOT PASSIVE ACTIVITIES

## 2022-02-25 NOTE — ED PROVIDER NOTES
[facility] ENCOUNTER          PHYSICIAN ASSISTANT NOTE       Date of evaluation: 2/24/2022    Chief Complaint     Other (alcohol withdrawal)    History of Present Illness     Bar Puentes is a 60 y.o. female who presents with a past medical history of CAD, cervical dystonia, CHF, hyperlipidemia, hypertension, alcoholism who presents with a complaint of shaking. Patient is an alcoholic who drinks 2 shots of bourbon every 3 hours. Unfortunately, the patient had a fall earlier today in which she states that she tripped over a cardboard box in her home and fell on her right side. She was taken by ambulance to [facility] where she was diagnosed with a right sided proximal humeral neck fracture. At the time the patient was imaged as well as put in a sling, given pain medication and a referral to orthopedics. Patient states that she had 2 glasses of champagne around lunchtime right after the fall. After getting home from the hospital the patient was incredibly shaky and she had another shot of bourbon before presenting to the emergency department here. Patient states that she has never had any seizures associated with alcohol withdrawal.  States that her last drink was probably several hours ago now. Patient states currently she is feeling nauseous, shaky, \"disoriented\". The patient typically takes 2 mg of Ativan for sleep. Patient denies fevers, chills, abdominal pain, chest pain, shortness of breath, vomiting, diarrhea, back pain, numbness or tingling of the extremities. Patient states that she took a dose of her Percocet prior to arriving to the emergency department. Currently endorsing 10/10 pain, constant, radiating up towards her right shoulder which is sharp in nature. Denies V/A hallucinations. Review of Systems     Review of Systems   Constitutional: Positive for diaphoresis. Negative for chills and fever.    HENT: Negative for congestion, facial swelling and sore throat. Eyes: Negative for discharge. Respiratory: Negative for cough, chest tightness, shortness of breath and wheezing. Cardiovascular: Negative for chest pain and palpitations. Gastrointestinal: Positive for nausea. Negative for constipation, diarrhea and vomiting. Genitourinary: Negative for difficulty urinating, flank pain and urgency. Musculoskeletal: Positive for arthralgias. Negative for myalgias, neck pain and neck stiffness. Neurological: Positive for dizziness, tremors, weakness and headaches. Hematological: Negative for adenopathy. Psychiatric/Behavioral: Negative for confusion. The patient is nervous/anxious. Past Medical, Surgical, Family, and Social History     She has a past medical history of C. difficile diarrhea, CAD (coronary artery disease), Cervical dystonia, CHF (congestive heart failure) ([facility] [address] Dental crown present, Fatty liver, Hyperlipidemia, Hypertension, Lichen sclerosus, and PONV (postoperative nausea and vomiting). She has a past surgical history that includes Breast surgery; Endoscopy, colon, diagnostic; Colonoscopy; Endometrial ablation; Colonoscopy (N/A, 7/2/2019); and Colonoscopy (7/2/2019). Her family history is not on file. She reports that she has never smoked. She has never used smokeless tobacco. She reports current alcohol use of about 3.0 standard drinks of alcohol per week. She reports that she does not use drugs. Medications     Previous Medications    LORAZEPAM (ATIVAN) 1 MG TABLET    Take 2 mg by mouth nightly.      MULTIPLE VITAMIN (MULTIVITAMIN) TABS TABLET    Take 1 tablet by mouth daily    PANTOPRAZOLE SODIUM (PROTONIX) 40 MG PACK PACKET    Take 40 mg by mouth every morning (before breakfast)    POLYETHYL GLYCOL-PROPYL GLYCOL 0.4-0.3 % (SYSTANE) 0.4-0.3 % OPHTHALMIC SOLUTION    Place 1 drop into both eyes nightly as needed for Dry Eyes    THIAMINE MONONITRATE (THIAMINE) 100 MG TABLET    Take 1 tablet by mouth daily VALSARTAN (DIOVAN) 160 MG TABLET    Take 160 mg by mouth daily 1/2 to 1 tablet daily based on BP    VILAZODONE HCL (VILAZODONE HCL) 10 MG TABS    Take 1 tablet by mouth daily    ZOLPIDEM (AMBIEN CR) 12.5 MG EXTENDED RELEASE TABLET    Take 12.5 mg by mouth nightly as needed for Sleep. Allergies     She is allergic to atorvastatin, iodine, pravastatin, rosuvastatin, shellfish allergy, sulfa antibiotics, tylenol [acetaminophen], and lunesta [eszopiclone]. Physical Exam     INITIAL VITALS: BP: (!) 181/102, Temp: 98.3 °F (36.8 °C), Pulse: 109, Resp: 21, SpO2: 94 %  Physical Exam  Vitals and nursing note reviewed. Constitutional:       General: She is not in acute distress. Appearance: Normal appearance. Comments: Patient is shaking on my examination. She appears very anxious. HENT:      Head: Normocephalic and atraumatic. Nose: Nose normal.      Mouth/Throat:      Mouth: Mucous membranes are moist.      Pharynx: Oropharynx is clear. Eyes:      Extraocular Movements: Extraocular movements intact. Pupils: Pupils are equal, round, and reactive to light. Cardiovascular:      Rate and Rhythm: Regular rhythm. Tachycardia present. Pulses: Normal pulses. Heart sounds: Normal heart sounds. Pulmonary:      Effort: Pulmonary effort is normal.      Breath sounds: Normal breath sounds. No wheezing, rhonchi or rales. Abdominal:      General: Abdomen is flat. Bowel sounds are normal. There is no distension. Palpations: Abdomen is soft. There is no mass. Tenderness: There is no abdominal tenderness. There is no guarding. Musculoskeletal:         General: Swelling, tenderness and signs of injury present. No deformity. Cervical back: Normal range of motion and neck supple. Comments: Tenderness to palpation of the right upper extremity. Right upper extremity is immobilized in arm sling. Sensation intact distal to the injury. Distal pulses 2+ bilaterally.  Intact strength and sensation of all extremities. Skin:     General: Skin is warm and dry. Capillary Refill: Capillary refill takes less than 2 seconds. Neurological:      General: No focal deficit present. Mental Status: She is alert and oriented to person, place, and time. Psychiatric:         Mood and Affect: Mood normal.         Behavior: Behavior normal.         Thought Content:  Thought content normal.         Judgment: Judgment normal.         Diagnostic Results     RADIOLOGY:  No orders to display     LABS:   Results for orders placed or performed during the hospital encounter of 02/24/22   CBC with Auto Differential   Result Value Ref Range    WBC 7.4 4.0 - 11.0 K/uL    RBC 3.91 (L) 4.00 - 5.20 M/uL    Hemoglobin 10.8 (L) 12.0 - 16.0 g/dL    Hematocrit 32.3 (L) 36.0 - 48.0 %    MCV 82.6 80.0 - 100.0 fL    MCH 27.5 26.0 - 34.0 pg    MCHC 33.3 31.0 - 36.0 g/dL    RDW 19.6 (H) 12.4 - 15.4 %    Platelets 514 327 - 891 K/uL    MPV 7.7 5.0 - 10.5 fL    Neutrophils % 87.0 %    Lymphocytes % 5.4 %    Monocytes % 7.4 %    Eosinophils % 0.0 %    Basophils % 0.2 %    Neutrophils Absolute 6.5 1.7 - 7.7 K/uL    Lymphocytes Absolute 0.4 (L) 1.0 - 5.1 K/uL    Monocytes Absolute 0.6 0.0 - 1.3 K/uL    Eosinophils Absolute 0.0 0.0 - 0.6 K/uL    Basophils Absolute 0.0 0.0 - 0.2 K/uL   Blood Gas, Venous   Result Value Ref Range    pH, Livan 7.418 7.350 - 7.450    pCO2, Livan 40.2 (L) 41.0 - 51.0 mmHg    pO2, Livan 47.2 (H) 25.0 - 40.0 mmHg    HCO3, Venous 25.9 24.0 - 28.0 mmol/L    Base Excess, Livan 1.3 -2.0 - 3.0 mmol/L    O2 Sat, Livan 80 Not established %    Carboxyhemoglobin 1.7 (H) 0.0 - 1.5 %    MetHgb, Livan 0.4 0.0 - 1.5 %    TC02 (Calc), Livan 27 mmol/L    Hemoglobin, Livan, Reduced 19.30 %     RECENT VITALS:  BP: (!) 173/106, Temp: 98.3 °F (36.8 °C), Pulse: 106, Resp: 21, SpO2: 94 %     Procedures     None    ED Course     Nursing Notes, Past Medical Hx,Past Surgical Hx, Social Hx, Allergies, and Family Hx were reviewed. The patient was given the following medications:  Orders Placed This Encounter   Medications    ondansetron (ZOFRAN) injection 4 mg    LORazepam (ATIVAN) injection 2 mg    ketorolac (TORADOL) injection 15 mg    sodium chloride flush 0.9 % injection 5-40 mL    sodium chloride flush 0.9 % injection 5-40 mL    0.9 % sodium chloride infusion    LORazepam (ATIVAN) injection 2 mg    lactated ringers bolus    sodium chloride 0.9 % 0,010 mL with folic acid 1 mg, adult multi-vitamin with vitamin k 10 mL, thiamine 100 mg    lidocaine 4 % external patch 1 patch     CONSULTS:  IP CONSULT TO HOSPITALIST    MEDICAL DECISION MAKING / ASSESSMENT / Radhaenrique Patiño is a 60 y.o. female with a past medical history of alcohol abuse. On exam the patient is AO x4, appears in acute distress and is visibly shaking in the room. The patient is afebrile, mildly tachycardic and hypertensive. Patient is diaphoretic. The acute evaluation for the patient's symptoms includes labs which are remarkable for CBC with red blood cell count of 3.91, hemoglobin 10.8, hematocrit of 32.3. These appear to be at the patient's baseline based on previous laboratory analysis. CMP shows sodium of 130, chloride 92, glucose of 159. Ethanol level is undetectable. Lipase within normal limits. Initial CIWA is 18. The patient was initially given 2 mg IV Ativan for acute alcohol withdrawal syndrome. On my reevaluation patient is not shaking as much as she was however I feel that another dose would be beneficial.  She was given another dose at this time of 2mg IV Ativan. I do not feel that further imaging of the patient's orthopedic injury is indicated this time. Patient states that she doesn't have any further pain in any other areas except for her right upper extremity. Patient's lower extremities are equally strong and with intact sensation. Patient has a nonfocal neurologic examination.      I did order acute alcohol withdrawal generalized protocol which includes fluids, banana bag. She was also given some Toradol, Zofran for nausea. Ultimately, patient needs admission for acute alcohol withdrawal.  I spoke to the hospitalist on call, who accepted the patient. The patient will be cared for in the emergency department until bed becomes available for her upstairs. The patient and her  at the bedside are agreeable to this plan. This patient was also evaluated by the attending physician. All care plans were discussed and agreed upon. Clinical Impression     1.  Alcohol withdrawal syndrome with perceptual disturbance [facility])      Disposition     DISPOSITION  Admit    Kobi Ford PA-C  02/25/22 0121

## 2022-02-25 NOTE — CARE COORDINATION
Case Management Assessment           Initial Evaluation                Date / Time of Evaluation: 2/25/2022 12:03 PM                 Assessment Completed by: Kris Kapadia RN    Patient Name: Jeremias Walden     YOB: 1960  Diagnosis: Alcohol dependence with withdrawal ([facility] [address] [F10.239]  Alcohol withdrawal syndrome with perceptual disturbance [facility]) [F10.232]     Date / Time: 2/24/2022 11:37 PM    Patient Admission Status: Inpatient    If patient is discharged prior to next notation, then this note serves as note for discharge by case management. Current PCP: Jasmyne Torres Patient: No    Chart Reviewed: Yes  Patient/ Family Interviewed: Yes    Initial assessment completed at bedside with: patient and spouse    Hospitalization in the last 30 days: No    Emergency Contacts:  Extended Emergency Contact Information  Primary Emergency Contact: Paul Ulrich  Address: [address] [place] [address], [address] [place] [address] Phone: [phone]  Work Phone: [phone]  Mobile Phone: [phone]  Relation: Spouse    Advance Directives:   Code Status: Full Code        Financial  Payor: Portillo Orr / Plan: [address]  / Product Type: *No Product type* /     Pre-cert required for SNF: Yes    Pharmacy    [address], [address] [place] [address],[address] [phone] F [phone]  [address] [place] [id number]  Phone: [phone] Fax: [phone]      Potential assistance Purchasing Medications: Potential Assistance Purchasing Medications: No  Does Patient want to participate in local refill/ meds to beds program?:      Meds To Beds General Rules:  1. Can ONLY be done Monday- Friday between 8:30am-5pm  2. Prescription(s) must be in pharmacy by 3pm to be filled same day  3. Copy of patient's insurance/ prescription drug card and patient face sheet must be sent along with the prescription(s)  4.  Cost of Rx cannot be added to hospital bill. If financial assistance is needed, please contact unit  or ;  or  CANNOT provide pharmacy voucher for patients co-pays  5. Patients can then  the prescription on their way out of the hospital at discharge, or pharmacy can deliver to the bedside if staff is available. (payment due at time of pick-up or delivery - cash, check, or card accepted)     Able to afford home medications/ co-pay costs: Yes    ADLS  Support Systems: Spouse/Significant Other    PT AM-PAC: 13 /24  OT AM-PAC: 9 /24    [place]: home with spouse, 2 story  Steps: 2-3    Plans to RETURN to current housing: Yes  Barriers to RETURNING to current housing: needs rehab    Theresa Avendano  Currently ACTIVE with [address]: No  Home Care Agency: Not Applicable          Durable Medical Equipment  DME Provider: n/a  Equipment: n/a    Home Oxygen and [address] prior to admission: No  Jeanne [address]: Not Applicable        DISCHARGE PLAN:  Disposition: Home- No Services Needed    Transportation PLAN for discharge: family     Factors facilitating achievement of predicted outcomes: Knowledge about rehab    Barriers to discharge: Medical complications    Additional Case Management Notes:   Patient sitting up in bed, able to answer most questions with help of spouse at bedside. Spouse states that they plan to go to outpatient therapy at [facility] once pt is medically stable and through withdraws. CM will reach out to SW to talk to pt about rehab once she is more coherent and able to participate. Patient had a fall and rt arm in sling. Spouse is hoping she can return home as he can provide 24/7 care. CM placed resources for substance abuse on AVS.  CM will continue to follow.     The Plan for Transition of Care is related to the following treatment goals of Alcohol dependence with withdrawal ([address] [F10.239]  Alcohol withdrawal syndrome with perceptual disturbance ([facility] [address] [F10.232]    The Patient and/or patient representative Alda Kohler and her family were provided with a choice of provider and agrees with the discharge plan Yes    Freedom of choice list was provided with basic dialogue that supports the patient's individualized plan of care/goals and shares the quality data associated with the providers.  Yes    Care Transition patient: No    Manolo Marrero RN  [facility]  Case Management Department  Ph: [phone]   Fax: [phone]

## 2022-02-25 NOTE — ED NOTES
Bed: A12-12  Expected date:   Expected time:   Means of arrival:   Comments:  juan diego العراقي, [facility]  02/24/22 1013 [address] [place] [address]

## 2022-02-25 NOTE — H&P
Hospital Medicine History & Physical      PCP: Zunilda Ezras    Date of Admission: 2/24/2022    Date of Service: Pt seen/examined on 02/25/2022  Pt seen/examined face to face on and admitted as inpatient with expected LOS greater than two midnights due to medical therapy        Chief Complaint:    Chief Complaint   Patient presents with    Other     alcohol withdrawal        History Of Present Illness:      60 y.o. female who presented to [facility] with past medical history of CAD, CVA, hypertension, hyperlipidemia presented to the ED due to shaking. Patient was recently admitted at [facility] where she was diagnosed with right arm fracture. The patient is wearing a sling. Patient reported that she had few drinks with her friends for the Super Bowl and then since I noticed that she is back on binge drinking daily at least 2 shots of bourbon every 3 hours. Patient reported that her last drink was around 3 PM.  Patient reported that she also had a fall today where she tripped over a cardboard box lost balance. No known relieving exacerbating factor no strength fever chills nausea vomiting chest pain abdominal pain or dysuria. Patient does report that she is feeling very anxious and has had a gradual in the past but never had a seizure or DTs. Patient does report that she did require 4 mg Ativan every hour for the first 24 hours when she was detoxing.       Past Medical History:          Diagnosis Date    C. difficile diarrhea 05/15/2021    CAD (coronary artery disease)     Cervical dystonia     CHF (congestive heart failure) (HCC)     Dental crown present     upper right and bonding    Fatty liver     Hyperlipidemia     Hypertension     Lichen sclerosus     PONV (postoperative nausea and vomiting)        Past Surgical History:          Procedure Laterality Date    BREAST SURGERY      COLONOSCOPY      COLONOSCOPY N/A 7/2/2019    COLONOSCOPY WITH BIOPSY performed by Dmitry Wahl E-Cigarette/Vaping Use Never User    Start Date     Passive Exposure     Quit Date     Counseling Given     Comments             Family History:      Reviewed in detail, and noncontributory  REVIEW OF SYSTEMS:     Constitutional:  No Fever, No Chills, No Night Sweats  ENT/Mouth:  No Nasal Congestion,  No Hoarseness, No new mouth lesion  Eyes:  No Eye Pain, No Redness, No Discharge  Cardiovascular:  No Chest Pain, No Orthopnea, No Palpitations  Respiratory:  No Cough, No Sputum, No Dyspnea  Gastrointestinal: No Vomiting, No Diarrhea, No abdominal pain  Genitourinary: No Urinary Frequency, No Hematuria, No Urinary pain  Musculoskeletal:  No worsening Arthralgias, No worsening Myalgias  Skin:  No new Skin Lesions, No new skin rash  Neuro: + Weakness, No new numbness. Psych:  No suicial ideation, No Violence ideation    PHYSICAL EXAM PERFORMED:    BP (!) 164/92   Pulse 98   Temp 98.3 °F (36.8 °C) (Oral)   Resp 27   SpO2 96%     General appearance:  mild acute distress, appears older than stated age  HEENT:   atraumatic, sclera anicteric, Conjunctivae clear. Neck: Supple,Trachea midline, no goiter  Respiratory:minimal accessory muscle usage, Normal respiratory effort. Clear to auscultation, bilaterally without wheezing  Cardiovascular:  Regular rate and rhythm, capillary refill 2 seconds  Abdomen: Soft, non-tender, non-distended with normal bowel sounds. Musculoskeletal:  No clubbing, cyanosis. trace edema LE bilaterally. Sling present  Skin: turgor normal.  No new rashes or lesions. Neurologic: Alert and oriented x4, no new focal sensory/motor deficits. Labs:     Recent Labs     02/25/22  0016   WBC 7.4   HGB 10.8*   HCT 32.3*        Recent Labs     02/25/22  0016   *   K 3.8   CL 92*   CO2 22   BUN 8   CREATININE <0.5*   CALCIUM 10.1     Recent Labs     02/25/22 0016   AST 92*   ALT 35   BILIDIR 0.3   BILITOT 1.1*   ALKPHOS 93     No results for input(s): INR in the last 72 hours.   No results for input(s): Hai Chris in the last 72 hours. Urinalysis:      Lab Results   Component Value Date    NITRU Negative 05/14/2021    WBCUA 0-2 05/14/2021    BACTERIA 2+ 05/14/2021    RBCUA 0-2 05/14/2021    BLOODU Negative 05/14/2021    SPECGRAV 1.025 05/14/2021    GLUCOSEU Negative 05/14/2021       Radiology:     CXR: I have reviewed the CXR with the following interpretation:   pending  EKG:  I have reviewed the EKG with the following interpretation:   pending  No orders to display       ASSESSMENT AND PLAN:    JOSUÉ/Mary Kay Lira 1106 Problems    Diagnosis Date Noted    Alcohol dependence with withdrawal ([facility] [F10.239] 02/25/2022     Alcohol dependence with withdrawal:  CIWA protocol initiated  Chlordiazepoxide scheduled  Ativan IV depending on scoring  Patient reported last admission required 4 mg Ativan every hour for the first 24 hours  No prior history of DTs or seizures    Normocytic anemia:  Iron panel, Hemoccult    Right arm fracture: Outpatient follow up  Appointment next week    Chronic medical conditions:  Cervical dystonia: Reported on Ativan 2 mg nightly for pain? Essential Hypertension: Continue home medication  Hyperlipidemia: Continue home medication  HFpEF: Not on lasix, continue to monitor.  No echo in years reported intermitted dyspnea, echo pending  CAD: Not on any meds, continue to monitor    Diet: advance as tolerated    DVT Prophylaxis: lovenox    Dispo:   Expected LOS greater than two [address], DO

## 2022-02-25 NOTE — PROGRESS NOTES
Pt arrived to [facility] 4457 from ED via stretcher. Pt transferred from stretcher to bed via sliding with sheet. VS taken, assessment complete, height and weight obtained, pt oriented to room and educated on call light.

## 2022-02-25 NOTE — ED PROVIDER NOTES
ED Attending Attestation Note     Date of evaluation: 2/24/2022    This patient was seen by the advance practice provider. I have seen and examined the patient, agree with the workup, evaluation, management and diagnosis. The care plan has been discussed. I have reviewed the ECG and concur with the DELLA's interpretation. My assessment reveals 2year-old female with a history of alcohol abuse, and a recent humeral fracture presenting with complaint of alcohol withdrawal.  She last drank heavily yesterday and had a shot of bourbon around 3 PM today. She is complaining of a sensation of bilateral lower extremity weakness, jitteriness and shaking, but denies visual or auditory hallucinations. SHe has intact strength of bilateral lower extremities and intact sensation.   Is an awake and alert, no acute distress but does have visible tremors in all extremities and hypertension        Soha Lira MD  02/25/22 4481

## 2022-02-25 NOTE — PLAN OF CARE
Problem: Falls - Risk of:  Goal: Will remain free from falls  Description: Will remain free from falls  2/25/2022 1322 by Umu Rock RN  Note: Bed low with wheels locked  call light in reach at all times  SR up  bed alarm on  increased rounding per staff      Problem: Pain:  Description: Pain management should include both nonpharmacologic and pharmacologic interventions.   Goal: Control of acute pain  Description: Control of acute pain  2/25/2022 1322 by Umu Rock RN  Note: Oxycodone per prn order/patient request due to humerus FX  patient endorses pain reduction  continue monitor and assess pain     Problem: Skin Integrity:  Goal: Absence of new skin breakdown  Description: Absence of new skin breakdown  2/25/2022 1322 by Umu Rock RN  Outcome: Met This Shift     Problem: Fluid Volume - Deficit:  Goal: Absence of fluid volume deficit signs and symptoms  Description: Absence of fluid volume deficit signs and symptoms  2/25/2022 1322 by Umu Rock RN  Note: Patient tolerating oral intake of fluids  continue I/O     Problem: Nutrition Deficit:  Goal: Ability to achieve adequate nutritional intake will improve  Description: Ability to achieve adequate nutritional intake will improve  Note: Patient tolerating oral diet without emesis
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
## 2022-02-25 NOTE — PLAN OF CARE
Problem: Fluid Volume - Deficit:  Goal: Absence of fluid volume deficit signs and symptoms  Description: Absence of fluid volume deficit signs and symptoms  Outcome: Ongoing  Note: Pt will be strictly monitored for I/Os and assess for signs and symptoms of fluid volume deficit. Problem: Falls - Risk of:  Goal: Will remain free from falls  Description: Will remain free from falls  Note: Pt will remain free from fall during hospitalization. Pt is on fall risk and seizure precautions. Pt bed alarm on, pt bed at lowest positions, pt rails are padded and 3/4, call light and belongings within reach. Pt is encouraged to use call light for assistance. Problem: Pain:  Goal: Control of acute pain  Description: Control of acute pain  Note: Pt. Is on withdrawal pain. Pt is given pain medication as per ordered to manage acute pain. Will continue to monitor. Problem: Skin Integrity:  Goal: Absence of new skin breakdown  Description: Absence of new skin breakdown  Note: Pt refuses 4eyes skin assessment. Pt current skin conditions is unable to assess.

## 2022-02-25 NOTE — PROGRESS NOTES
Pt refused 4eyes skin assessment r/t pain and withdrawal symptoms. Pt's R arm is currently in sling related to recent fx. CIWA will be assessed as needed and treated appropriately.

## 2022-02-25 NOTE — ED TRIAGE NOTES
Pt. Presents to ED via squad worried she is going to have a seizure from withdrawal. No PMHx of seizures. States she usually drinks 2 shots of bourbon every three hours. However, she was prescribed pain mediation today after a fall and has decreased her alcohol consumption since because of this. States she had two glasses of champagne prior to fall around lunchtime today and then after she came home from hospital had one shot of bourbon. R. Arm is in a sling.; pt. States it is broken. Visible tremors.

## 2022-02-25 NOTE — PROGRESS NOTES
Hospital Medicine Progress Note     PCP: Geoffrey Zavala    Date of Admission: 2/24/2022          Chief Complaint:  Alcoholism, shaking        History Of Present Illness:      60 y.o. female  with CAD, hx of CVA, hypertension, and hyperlipidemia who presented to the ED due to shaking. Patient was recently admitted at [facility] where she was diagnosed with right arm fracture. The patient is wearing a sling. Patient reported that she had few drinks with her friends for the Super Bowl and then since I noticed that she is back on binge drinking daily at least 2 shots of bourbon every 3 hours. Patient reported that her last drink was around 3 PM day of presentation. Patient reported that she also had a fall day of admission,  where she tripped over a cardboard box lost balance. She was taken by ambulance to [facility] where she was diagnosed with a right sided proximal humeral neck fracture. At the time the patient was imaged as well as put in a sling, given pain medication and a referral to orthopedics. Appears she was discharged Home. After getting home from the hospital the patient was incredibly shaky and she had another shot of bourbon before presenting to the emergency department here due to feeling nauseous, shaky, \"disoriented\" as well as 10/10 pain, constant, radiating up towards her right shoulder which is sharp in nature. No known relieving exacerbating factor no strength fever chills nausea vomiting chest pain abdominal pain or dysuria. Patient does report that she is feeling very anxious and has had a gradual in the past but never had a seizure or DTs. Patient does report that she did require 4 mg Ativan every hour for the first 24 hours when she was detoxing. On presentation, she was afebrile. BP was 181/102. She wasmildly tachycardic at 109. Lab-work showed no leucocytosis. Hgb 10.8, Platelet 218. Na was 129, Lactate 2.3.  Liver enzymes showed AST elevated at 79, T bili elevated at 1.1; Normal ALT and ALP. Lipase was normal at 49. ETOH: not detected. CXR: Elevated right hemidiaphragm with low right lung volume. EKG showed NSR, Q waves in septal leads. Interval HPI  Admitted earlier today by my colleague    No new issues    No chest pain    No dyspnea    No fever    No seizures    S/O at bedside. Medications : Reviewed        Allergies:  Atorvastatin, Iodine, Pravastatin, Rosuvastatin, Shellfish allergy, Sulfa antibiotics, Tylenol [acetaminophen], and Lunesta [eszopiclone]        REVIEW OF SYSTEMS: as in HPI  All other systems reviewed and are negative        PHYSICAL EXAM PERFORMED:    BP (!) 158/90   Pulse 87   Temp 97.5 °F (36.4 °C) (Axillary)   Resp 20   Ht 5' 10\" (1.778 m)   Wt 236 lb 1.8 oz (107.1 kg)   SpO2 97%   BMI 33.88 kg/m²     General appearance:  mild acute distress, appears older than stated age  HEENT:   atraumatic, sclera anicteric, Conjunctivae clear. Neck: Supple,Trachea midline, no goiter  Respiratory:minimal accessory muscle usage, Normal respiratory effort. Clear to auscultation, bilaterally without wheezing  Cardiovascular:  Regular rate and rhythm, capillary refill 2 seconds  Abdomen: Soft, non-tender, non-distended with normal bowel sounds. Musculoskeletal:  Tenderness to palpation of the right upper extremity. Right upper extremity is immobilized in arm sling. Sensation intact distal to the injury. Distal pulses 2+ bilaterally. Intact strength and sensation of all extremities. Skin: turgor normal.  No new rashes or lesions. Neurologic: Alert and oriented x4, no new focal sensory/motor deficits.      Labs:     Recent Labs     02/25/22  0016 02/25/22  0511   WBC 7.4 6.1   HGB 10.8* 9.7*   HCT 32.3* 29.8*    132*     Recent Labs     02/25/22  0016 02/25/22  0511   * 129*   K 3.8 4.0   CL 92* 93*   CO2 22 23   BUN 8 8   CREATININE <0.5* <0.5*   CALCIUM 10.1 9.9     Recent Labs 02/25/22  0016 02/25/22  0511   AST 92* 79*   ALT 35 32   BILIDIR 0.3  --    BILITOT 1.1* 1.1*   ALKPHOS 93 87     No results for input(s): INR in the last 72 hours. Recent Labs     02/25/22  0016 02/25/22  0511   TROPONINI <0.01 <0.01       Urinalysis:      Lab Results   Component Value Date    NITRU Negative 05/14/2021    WBCUA 0-2 05/14/2021    BACTERIA 2+ 05/14/2021    RBCUA 0-2 05/14/2021    BLOODU Negative 05/14/2021    SPECGRAV 1.025 05/14/2021    GLUCOSEU Negative 05/14/2021             ASSESSMENT AND PLAN:  60 y.o. female  with CAD, hx of CVA, hypertension, and hyperlipidemia who presented to the ED due to shaking. Alcohol dependence with withdrawal: POA  - CIWA protocol initiated  - Chlordiazepoxide scheduled  - MVI, Thiamine  - Patient reported last admission required 4 mg Ativan every hour for the first 24 hours: places her at high risk for severe DTs and need for higher level of care  - Monitor closely      Fall: POA  - Mechanical fall, prob sec to alcoholism  - Check UA and Vit B12  -  on ETOH  - PT/OT eval      Traumatic Right sided proximal humeral neck fracture: POA  - Diagnosed at [facility] were she had presented after her fall, was discharged Home, and re-presented here with shaking, disorientation and severe right arm pain  -   At [facility], was imaged as well as put in a sling, given pain medication and a referred to o/p orthopedics.   - Continue immobilization on sling  - Continue pain control  - Monitor, for need for in pxt Ortho eval      Chronic Normocytic anemia:  - Update work up: Iron panel, Hemoccult ordered  - Update B12, Folate      Essential Hypertension/ hypertensive urgency: POA  - BP was 181/102 on presentation  -  Continue home medication: ARB  - Monitor and adjust meds as needed      Chronic HFpEF.  POA  - Reported intermitted dyspnea, echo pending  - Not on lasix at home  - No echo in years: ordered      CAD: POA -Hx of subclinical coronary artery disease based on cardiac CTA 8/18/2016  Familial hypercholesterolemia intolerant to statin therapy: POA   - EKG: NSR, old anterior infarct  - On just ARB at home  - Not on other cardiac meds  - Appears was on Repatha in the past  - Will [facility] F[facility]        Anxiety Disorder: POA  Cervical dystonia: POA  - Benzodiazepine dependent: Reported on Ativan 2 mg nightly  - Discussed with her need to discuss with her regular providers, risk/benefits of  chronic benzo use which I have discussed with her,  taqueria in setting of her alcoholism      HX of CVA:   - Will need optimization of risk factor mgt  - F/u with PCP        DVT Prophylaxis: lovenox        Disposition:   Pending progress  PT/OT ian Akers MD
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
## 2022-02-26 LAB
A/G RATIO: 1.2 (ref 1.1–2.2)
ALBUMIN SERPL-MCNC: 3.7 G/DL (ref 3.4–5)
ALP BLD-CCNC: 78 U/L (ref 40–129)
ALT SERPL-CCNC: 25 U/L (ref 10–40)
ANION GAP SERPL CALCULATED.3IONS-SCNC: 10 MMOL/L (ref 3–16)
AST SERPL-CCNC: 64 U/L (ref 15–37)
BACTERIA: ABNORMAL /HPF
BASOPHILS ABSOLUTE: 0 K/UL (ref 0–0.2)
BASOPHILS RELATIVE PERCENT: 1 %
BILIRUB SERPL-MCNC: 1.2 MG/DL (ref 0–1)
BILIRUBIN URINE: ABNORMAL
BLOOD, URINE: NEGATIVE
BUN BLDV-MCNC: 9 MG/DL (ref 7–20)
CALCIUM SERPL-MCNC: 9.9 MG/DL (ref 8.3–10.6)
CHLORIDE BLD-SCNC: 92 MMOL/L (ref 99–110)
CLARITY: CLEAR
CO2: 27 MMOL/L (ref 21–32)
COLOR: ABNORMAL
CREAT SERPL-MCNC: <0.5 MG/DL (ref 0.6–1.2)
EOSINOPHILS ABSOLUTE: 0.1 K/UL (ref 0–0.6)
EOSINOPHILS RELATIVE PERCENT: 1.6 %
FERRITIN: 120.7 NG/ML (ref 15–150)
FOLATE: 13.44 NG/ML (ref 4.78–24.2)
GFR AFRICAN AMERICAN: >60
GFR NON-AFRICAN AMERICAN: >60
GLUCOSE BLD-MCNC: 103 MG/DL (ref 70–99)
GLUCOSE URINE: NEGATIVE MG/DL
HCT VFR BLD CALC: 29.6 % (ref 36–48)
HEMOGLOBIN: 9.6 G/DL (ref 12–16)
IRON SATURATION: 75 % (ref 15–50)
IRON: 241 UG/DL (ref 37–145)
KETONES, URINE: NEGATIVE MG/DL
LEUKOCYTE ESTERASE, URINE: NEGATIVE
LYMPHOCYTES ABSOLUTE: 0.6 K/UL (ref 1–5.1)
LYMPHOCYTES RELATIVE PERCENT: 12.4 %
MCH RBC QN AUTO: 27.4 PG (ref 26–34)
MCHC RBC AUTO-ENTMCNC: 32.4 G/DL (ref 31–36)
MCV RBC AUTO: 84.7 FL (ref 80–100)
MICROSCOPIC EXAMINATION: YES
MONOCYTES ABSOLUTE: 0.3 K/UL (ref 0–1.3)
MONOCYTES RELATIVE PERCENT: 7 %
NEUTROPHILS ABSOLUTE: 3.6 K/UL (ref 1.7–7.7)
NEUTROPHILS RELATIVE PERCENT: 78 %
NITRITE, URINE: POSITIVE
PDW BLD-RTO: 19.2 % (ref 12.4–15.4)
PH UA: 6 (ref 5–8)
PLATELET # BLD: 107 K/UL (ref 135–450)
PMV BLD AUTO: 8.2 FL (ref 5–10.5)
POTASSIUM REFLEX MAGNESIUM: 3.9 MMOL/L (ref 3.5–5.1)
PROTEIN UA: ABNORMAL MG/DL
RBC # BLD: 3.5 M/UL (ref 4–5.2)
RBC UA: ABNORMAL /HPF (ref 0–4)
SODIUM BLD-SCNC: 129 MMOL/L (ref 136–145)
SPECIFIC GRAVITY UA: 1.02 (ref 1–1.03)
TOTAL IRON BINDING CAPACITY: 321 UG/DL (ref 260–445)
TOTAL PROTEIN: 6.9 G/DL (ref 6.4–8.2)
URINE REFLEX TO CULTURE: ABNORMAL
URINE TYPE: ABNORMAL
UROBILINOGEN, URINE: 0.2 E.U./DL
VITAMIN B-12: 336 PG/ML (ref 211–911)
WBC # BLD: 4.6 K/UL (ref 4–11)
WBC UA: ABNORMAL /HPF (ref 0–5)

## 2022-02-26 PROCEDURE — 85025 COMPLETE CBC W/AUTO DIFF WBC: CPT

## 2022-02-26 PROCEDURE — 36415 COLL VENOUS BLD VENIPUNCTURE: CPT

## 2022-02-26 PROCEDURE — 82607 VITAMIN B-12: CPT

## 2022-02-26 PROCEDURE — 2580000003 HC RX 258: Performed by: INTERNAL MEDICINE

## 2022-02-26 PROCEDURE — 82746 ASSAY OF FOLIC ACID SERUM: CPT

## 2022-02-26 PROCEDURE — 2580000003 HC RX 258: Performed by: PHYSICIAN ASSISTANT

## 2022-02-26 PROCEDURE — 83540 ASSAY OF IRON: CPT

## 2022-02-26 PROCEDURE — 6370000000 HC RX 637 (ALT 250 FOR IP): Performed by: INTERNAL MEDICINE

## 2022-02-26 PROCEDURE — 83550 IRON BINDING TEST: CPT

## 2022-02-26 PROCEDURE — 6360000002 HC RX W HCPCS: Performed by: INTERNAL MEDICINE

## 2022-02-26 PROCEDURE — 81001 URINALYSIS AUTO W/SCOPE: CPT

## 2022-02-26 PROCEDURE — 87086 URINE CULTURE/COLONY COUNT: CPT

## 2022-02-26 PROCEDURE — 6370000000 HC RX 637 (ALT 250 FOR IP): Performed by: EMERGENCY MEDICINE

## 2022-02-26 PROCEDURE — 82728 ASSAY OF FERRITIN: CPT

## 2022-02-26 PROCEDURE — 80053 COMPREHEN METABOLIC PANEL: CPT

## 2022-02-26 PROCEDURE — 2060000000 HC ICU INTERMEDIATE R&B

## 2022-02-26 RX ORDER — BISACODYL 10 MG
10 SUPPOSITORY, RECTAL RECTAL DAILY PRN
Status: DISCONTINUED | OUTPATIENT
Start: 2022-02-26 | End: 2022-03-01 | Stop reason: HOSPADM

## 2022-02-26 RX ORDER — OXYCODONE HYDROCHLORIDE 5 MG/1
5 TABLET ORAL EVERY 4 HOURS PRN
Status: DISPENSED | OUTPATIENT
Start: 2022-02-26 | End: 2022-03-01

## 2022-02-26 RX ORDER — SENNA AND DOCUSATE SODIUM 50; 8.6 MG/1; MG/1
2 TABLET, FILM COATED ORAL 2 TIMES DAILY
Status: DISCONTINUED | OUTPATIENT
Start: 2022-02-26 | End: 2022-03-01 | Stop reason: HOSPADM

## 2022-02-26 RX ORDER — POLYETHYLENE GLYCOL 3350 17 G/17G
17 POWDER, FOR SOLUTION ORAL DAILY PRN
Status: DISCONTINUED | OUTPATIENT
Start: 2022-02-26 | End: 2022-03-01

## 2022-02-26 RX ADMIN — LORAZEPAM 1 MG: 1 TABLET ORAL at 11:05

## 2022-02-26 RX ADMIN — OXYCODONE 5 MG: 5 TABLET ORAL at 03:47

## 2022-02-26 RX ADMIN — SODIUM CHLORIDE, PRESERVATIVE FREE 10 ML: 5 INJECTION INTRAVENOUS at 09:00

## 2022-02-26 RX ADMIN — VALSARTAN 160 MG: 160 TABLET, FILM COATED ORAL at 08:59

## 2022-02-26 RX ADMIN — CHLORDIAZEPOXIDE HYDROCHLORIDE 10 MG: 5 CAPSULE ORAL at 08:57

## 2022-02-26 RX ADMIN — CHLORDIAZEPOXIDE HYDROCHLORIDE 10 MG: 5 CAPSULE ORAL at 19:57

## 2022-02-26 RX ADMIN — KETOROLAC TROMETHAMINE 15 MG: 15 INJECTION, SOLUTION INTRAMUSCULAR; INTRAVENOUS at 07:16

## 2022-02-26 RX ADMIN — PANTOPRAZOLE SODIUM 40 MG: 40 TABLET, DELAYED RELEASE ORAL at 06:16

## 2022-02-26 RX ADMIN — SODIUM CHLORIDE 25 ML: 9 INJECTION, SOLUTION INTRAVENOUS at 15:25

## 2022-02-26 RX ADMIN — SODIUM CHLORIDE, PRESERVATIVE FREE 10 ML: 5 INJECTION INTRAVENOUS at 08:58

## 2022-02-26 RX ADMIN — LORAZEPAM 4 MG: 1 TABLET ORAL at 23:08

## 2022-02-26 RX ADMIN — KETOROLAC TROMETHAMINE 15 MG: 15 INJECTION, SOLUTION INTRAMUSCULAR; INTRAVENOUS at 22:31

## 2022-02-26 RX ADMIN — OXYCODONE 5 MG: 5 TABLET ORAL at 21:21

## 2022-02-26 RX ADMIN — LORAZEPAM 2 MG: 1 TABLET ORAL at 14:54

## 2022-02-26 RX ADMIN — LORAZEPAM 2 MG: 1 TABLET ORAL at 17:09

## 2022-02-26 RX ADMIN — LORAZEPAM 2 MG: 2 INJECTION INTRAMUSCULAR; INTRAVENOUS at 00:28

## 2022-02-26 RX ADMIN — CHLORDIAZEPOXIDE HYDROCHLORIDE 10 MG: 5 CAPSULE ORAL at 14:54

## 2022-02-26 RX ADMIN — CEFTRIAXONE 1000 MG: 1 INJECTION, POWDER, FOR SOLUTION INTRAMUSCULAR; INTRAVENOUS at 15:26

## 2022-02-26 RX ADMIN — OXYCODONE 5 MG: 5 TABLET ORAL at 12:15

## 2022-02-26 RX ADMIN — KETOROLAC TROMETHAMINE 15 MG: 15 INJECTION, SOLUTION INTRAMUSCULAR; INTRAVENOUS at 00:25

## 2022-02-26 RX ADMIN — OXYCODONE 5 MG: 5 TABLET ORAL at 16:43

## 2022-02-26 RX ADMIN — LORAZEPAM 4 MG: 2 INJECTION INTRAMUSCULAR; INTRAVENOUS at 03:47

## 2022-02-26 RX ADMIN — LORAZEPAM 2 MG: 1 TABLET ORAL at 19:54

## 2022-02-26 RX ADMIN — LORAZEPAM 2 MG: 2 INJECTION INTRAMUSCULAR; INTRAVENOUS at 07:16

## 2022-02-26 RX ADMIN — MULTIVITAMIN TABLET 1 TABLET: TABLET at 08:58

## 2022-02-26 RX ADMIN — THIAMINE HCL TAB 100 MG 100 MG: 100 TAB at 08:58

## 2022-02-26 RX ADMIN — LORAZEPAM 2 MG: 1 TABLET ORAL at 21:48

## 2022-02-26 RX ADMIN — SODIUM CHLORIDE, PRESERVATIVE FREE 10 ML: 5 INJECTION INTRAVENOUS at 21:59

## 2022-02-26 RX ADMIN — ONDANSETRON 4 MG: 2 INJECTION INTRAMUSCULAR; INTRAVENOUS at 03:48

## 2022-02-26 RX ADMIN — ENOXAPARIN SODIUM 40 MG: 100 INJECTION SUBCUTANEOUS at 08:58

## 2022-02-26 ASSESSMENT — PAIN DESCRIPTION - ONSET
ONSET: ON-GOING
ONSET: AWAKENED FROM SLEEP
ONSET: ON-GOING
ONSET: AWAKENED FROM SLEEP
ONSET: ON-GOING
ONSET: AWAKENED FROM SLEEP
ONSET: ON-GOING

## 2022-02-26 ASSESSMENT — PAIN SCALES - GENERAL
PAINLEVEL_OUTOF10: 10
PAINLEVEL_OUTOF10: 8
PAINLEVEL_OUTOF10: 8
PAINLEVEL_OUTOF10: 0
PAINLEVEL_OUTOF10: 10
PAINLEVEL_OUTOF10: 8
PAINLEVEL_OUTOF10: 10
PAINLEVEL_OUTOF10: 8
PAINLEVEL_OUTOF10: 10
PAINLEVEL_OUTOF10: 10
PAINLEVEL_OUTOF10: 8
PAINLEVEL_OUTOF10: 8

## 2022-02-26 ASSESSMENT — PAIN DESCRIPTION - FREQUENCY
FREQUENCY: INTERMITTENT
FREQUENCY: CONTINUOUS
FREQUENCY: INTERMITTENT

## 2022-02-26 ASSESSMENT — PAIN DESCRIPTION - ORIENTATION
ORIENTATION: RIGHT

## 2022-02-26 ASSESSMENT — PAIN - FUNCTIONAL ASSESSMENT
PAIN_FUNCTIONAL_ASSESSMENT: INTOLERABLE, UNABLE TO DO ANY ACTIVE OR PASSIVE ACTIVITIES
PAIN_FUNCTIONAL_ASSESSMENT: PREVENTS OR INTERFERES WITH MANY ACTIVE NOT PASSIVE ACTIVITIES
PAIN_FUNCTIONAL_ASSESSMENT: INTOLERABLE, UNABLE TO DO ANY ACTIVE OR PASSIVE ACTIVITIES
PAIN_FUNCTIONAL_ASSESSMENT: PREVENTS OR INTERFERES WITH MANY ACTIVE NOT PASSIVE ACTIVITIES
PAIN_FUNCTIONAL_ASSESSMENT: PREVENTS OR INTERFERES WITH MANY ACTIVE NOT PASSIVE ACTIVITIES
PAIN_FUNCTIONAL_ASSESSMENT: INTOLERABLE, UNABLE TO DO ANY ACTIVE OR PASSIVE ACTIVITIES

## 2022-02-26 ASSESSMENT — PAIN DESCRIPTION - LOCATION
LOCATION: ARM
LOCATION: ARM;SHOULDER
LOCATION: ARM;SHOULDER
LOCATION: ARM;NECK
LOCATION: ARM;SHOULDER
LOCATION: ARM
LOCATION: ARM;SHOULDER
LOCATION: ARM;NECK
LOCATION: ARM
LOCATION: ARM;SHOULDER
LOCATION: ARM;NECK

## 2022-02-26 ASSESSMENT — PAIN DESCRIPTION - PROGRESSION
CLINICAL_PROGRESSION: NOT CHANGED
CLINICAL_PROGRESSION: NOT CHANGED
CLINICAL_PROGRESSION: GRADUALLY IMPROVING
CLINICAL_PROGRESSION: GRADUALLY IMPROVING
CLINICAL_PROGRESSION: GRADUALLY WORSENING
CLINICAL_PROGRESSION: GRADUALLY IMPROVING
CLINICAL_PROGRESSION: GRADUALLY WORSENING
CLINICAL_PROGRESSION: GRADUALLY IMPROVING
CLINICAL_PROGRESSION: GRADUALLY WORSENING
CLINICAL_PROGRESSION: NOT CHANGED

## 2022-02-26 ASSESSMENT — PAIN DESCRIPTION - DESCRIPTORS
DESCRIPTORS: ACHING;DISCOMFORT

## 2022-02-26 ASSESSMENT — PAIN DESCRIPTION - PAIN TYPE
TYPE: ACUTE PAIN

## 2022-02-26 NOTE — PLAN OF CARE
Problem: Falls - Risk of:  Goal: Will remain free from falls  Description: Will remain free from falls  Outcome: Ongoing  Goal: Absence of physical injury  Description: Absence of physical injury  Outcome: Ongoing     Problem: Pain:  Goal: Pain level will decrease  Description: Pain level will decrease  Outcome: Ongoing  Goal: Control of acute pain  Description: Control of acute pain  Outcome: Ongoing  Goal: Control of chronic pain  Description: Control of chronic pain  Outcome: Ongoing     Problem: Skin Integrity:  Goal: Will show no infection signs and symptoms  Description: Will show no infection signs and symptoms  Outcome: Ongoing  Goal: Absence of new skin breakdown  Description: Absence of new skin breakdown  Outcome: Ongoing     Problem: Fluid Volume - Deficit:  Goal: Absence of fluid volume deficit signs and symptoms  Description: Absence of fluid volume deficit signs and symptoms  Outcome: Ongoing     Problem: Nutrition Deficit:  Goal: Ability to achieve adequate nutritional intake will improve  Description: Ability to achieve adequate nutritional intake will improve  Outcome: Ongoing

## 2022-02-26 NOTE — PROGRESS NOTES
Patient does not want  Bill Holstein called this early in the morning for updates.   She states she will update him later in the morning and he can be updated when he comes to the hospital.

## 2022-02-26 NOTE — CONSULTS
Department of Orthopedic Surgery  Consult Note        Reason for Consult:  Right proximal humerus fracture      CHIEF COMPLAINT:  Right proximal humerus fracture    History Obtained From:  patient    HISTORY OF PRESENT ILLNESS:                The patient is a 60 y.o. female who presents with a right nondisplaced proximal humerus fracture.  Orthopedic surgery was consulted for evaluation and management    Past Medical History:        Diagnosis Date    C. difficile diarrhea 05/15/2021    CAD (coronary artery disease)     Cervical dystonia     CHF (congestive heart failure) ([facility] [address]     Dental crown present     upper right and bonding    Fatty liver     Hyperlipidemia     Hypertension     Lichen sclerosus     PONV (postoperative nausea and vomiting)      Past Surgical History:        Procedure Laterality Date    BREAST SURGERY      COLONOSCOPY      COLONOSCOPY N/A 7/2/2019    COLONOSCOPY WITH BIOPSY performed by Ganga Flanagan MD at [facility]  7/2/2019    COLONOSCOPY POLYPECTOMY SNARE/COLD BIOPSY performed by Ganga Flanagan MD at [address] [place] [address], [place], DIAGNOSTIC       Current Medications:   Current Facility-Administered Medications: sodium chloride flush 0.9 % injection 5-40 mL, 5-40 mL, IntraVENous, 2 times per day  sodium chloride flush 0.9 % injection 5-40 mL, 5-40 mL, IntraVENous, PRN  0.9 % sodium chloride infusion, 25 mL, IntraVENous, PRN  lidocaine 4 % external patch 1 patch, 1 patch, TransDERmal, Daily  sodium chloride flush 0.9 % injection 10 mL, 10 mL, IntraVENous, 2 times per day  sodium chloride flush 0.9 % injection 10 mL, 10 mL, IntraVENous, PRN  0.9 % sodium chloride infusion, 25 mL, IntraVENous, PRN  potassium chloride 10 mEq/100 mL IVPB (Peripheral Line), 10 mEq, IntraVENous, PRN  magnesium sulfate 2000 mg in 50 mL IVPB premix, 2,000 mg, IntraVENous, PRN  promethazine (PHENERGAN) tablet 12.5 mg, 12.5 mg, Oral, Q6H PRN **OR** ondansetron (ZOFRAN) injection 4 mg, 4 mg, IntraVENous, Q6H PRN  acetaminophen (TYLENOL) tablet 650 mg, 650 mg, Oral, Q6H PRN **OR** [DISCONTINUED] acetaminophen (TYLENOL) suppository 650 mg, 650 mg, Rectal, Q6H PRN  chlordiazePOXIDE (LIBRIUM) capsule 10 mg, 10 mg, Oral, TID  thiamine mononitrate tablet 100 mg, 100 mg, Oral, Daily  multivitamin 1 tablet, 1 tablet, Oral, Daily  LORazepam (ATIVAN) tablet 1 mg, 1 mg, Oral, Q1H PRN **OR** LORazepam (ATIVAN) injection 1 mg, 1 mg, IntraVENous, Q1H PRN **OR** LORazepam (ATIVAN) tablet 2 mg, 2 mg, Oral, Q1H PRN **OR** LORazepam (ATIVAN) injection 2 mg, 2 mg, IntraVENous, Q1H PRN **OR** LORazepam (ATIVAN) tablet 3 mg, 3 mg, Oral, Q1H PRN **OR** LORazepam (ATIVAN) injection 3 mg, 3 mg, IntraVENous, Q1H PRN **OR** LORazepam (ATIVAN) tablet 4 mg, 4 mg, Oral, Q1H PRN **OR** LORazepam (ATIVAN) injection 4 mg, 4 mg, IntraVENous, Q1H PRN  pantoprazole (PROTONIX) tablet 40 mg, 40 mg, Oral, QAM AC  valsartan (DIOVAN) tablet 160 mg, 160 mg, Oral, Daily  enoxaparin (LOVENOX) injection 40 mg, 40 mg, SubCUTAneous, Daily  morphine (PF) injection 2 mg, 2 mg, IntraVENous, Q6H PRN  ketorolac (TORADOL) injection 15 mg, 15 mg, IntraVENous, Q6H PRN  Allergies:  Atorvastatin, Iodine, Pravastatin, Rosuvastatin, Shellfish allergy, Sulfa antibiotics, Tylenol [acetaminophen], and Lunesta [eszopiclone]    Social History:   None  Family History:   History reviewed. No pertinent family history.   REVIEW OF SYSTEMS:    Right proximal humerus fracture    PHYSICAL EXAM:    AAOx3  RUE with no gross deformity  Skin intact  Mild right shoulder swelling  +TTP over right proximal arm/shoulder  RUE NV intact C5-T1 with motor/sensory function intact  +palpable right radial pulse  Right arm compartments soft    DATA:    Previously obtained 2 view right shoulder radiographs demonstrate a nondisplaced proximal humerus fracture    IMPRESSION/RECOMMENDATIONS:    NWB/No lifting right upper extremity  Sling at all times  PT/OT  Pain control  DVT prophylaxis  Medical management  Discharge planning  Follow up 1-2 weeks after hospital discharge

## 2022-02-26 NOTE — PROGRESS NOTES
Hospital Medicine Progress Note     PCP: Mandy Cordero    Date of Admission: 2/24/2022          Chief Complaint:  Alcoholism, shaking        History Of Present Illness:      60 y.o. female  with CAD, hx of CVA, hypertension, and hyperlipidemia who presented to the ED due to shaking. Patient was recently admitted at [facility] where she was diagnosed with right arm fracture. The patient is wearing a sling. Patient reported that she had few drinks with her friends for the Super Bowl and then since I noticed that she is back on binge drinking daily at least 2 shots of bourbon every 3 hours. Patient reported that her last drink was around 3 PM day of presentation. Patient reported that she also had a fall day of admission,  where she tripped over a cardboard box lost balance. She was taken by ambulance to [facility] where she was diagnosed with a right sided proximal humeral neck fracture. At the time the patient was imaged as well as put in a sling, given pain medication and a referral to orthopedics. Appears she was discharged Home. After getting home from the hospital the patient was incredibly shaky and she had another shot of bourbon before presenting to the emergency department here due to feeling nauseous, shaky, \"disoriented\" as well as 10/10 pain, constant, radiating up towards her right shoulder which is sharp in nature. No known relieving exacerbating factor no strength fever chills nausea vomiting chest pain abdominal pain or dysuria. Patient does report that she is feeling very anxious and has had a gradual in the past but never had a seizure or DTs. Patient does report that she did require 4 mg Ativan every hour for the first 24 hours when she was detoxing. On presentation, she was afebrile. BP was 181/102. She wasmildly tachycardic at 109. Lab-work showed no leucocytosis. Hgb 10.8, Platelet 941. Na was 129, Lactate 2.3.  Liver enzymes showed AST elevated at 79, T bili elevated at 1.1; Normal ALT and ALP. Lipase was normal at 49. ETOH: not detected. CXR: Elevated right hemidiaphragm with low right lung volume. EKG showed NSR, Q waves in septal leads. Interval HPI  Admitted earlier today by my colleague    No new issues    No chest pain    No dyspnea    No fever    No seizures    S/O at bedside. Medications : Reviewed        Allergies:  Atorvastatin, Iodine, Pravastatin, Rosuvastatin, Shellfish allergy, Sulfa antibiotics, Tylenol [acetaminophen], and Lunesta [eszopiclone]        REVIEW OF SYSTEMS: as in HPI  All other systems reviewed and are negative        PHYSICAL EXAM PERFORMED:    BP (!) 144/89   Pulse 101   Temp 99.4 °F (37.4 °C) (Oral)   Resp 20   Ht 5' 10\" (1.778 m)   Wt 237 lb 14 oz (107.9 kg)   SpO2 98%   BMI 34.13 kg/m²     General appearance:  mild acute distress, appears older than stated age  HEENT:   atraumatic, sclera anicteric, Conjunctivae clear. Neck: Supple,Trachea midline, no goiter  Respiratory:minimal accessory muscle usage, Normal respiratory effort. Clear to auscultation, bilaterally without wheezing  Cardiovascular:  Regular rate and rhythm, capillary refill 2 seconds  Abdomen: Soft, non-tender, non-distended with normal bowel sounds. Musculoskeletal:  Tenderness to palpation of the right upper extremity. Right upper extremity is immobilized in arm sling. Sensation intact distal to the injury. Distal pulses 2+ bilaterally. Intact strength and sensation of all extremities. Skin: turgor normal.  No new rashes or lesions. Neurologic: Alert and oriented x4, no new focal sensory/motor deficits.      Labs:     Recent Labs     02/25/22 0016 02/25/22 0511 02/26/22 0527   WBC 7.4 6.1 4.6   HGB 10.8* 9.7* 9.6*   HCT 32.3* 29.8* 29.6*    132* 107*     Recent Labs     02/25/22 0016 02/25/22 0511 02/26/22 0527   * 129* 129*   K 3.8 4.0 3.9   CL 92* 93* 92*   CO2 22 23 27   BUN 8 8 9 CREATININE <0.5* <0.5* <0.5*   CALCIUM 10.1 9.9 9.9     Recent Labs     02/25/22  0016 02/25/22  0511 02/26/22  0527   AST 92* 79* 64*   ALT 35 32 25   BILIDIR 0.3  --   --    BILITOT 1.1* 1.1* 1.2*   ALKPHOS 93 87 78     No results for input(s): INR in the last 72 hours. Recent Labs     02/25/22  0016 02/25/22  0511   TROPONINI <0.01 <0.01       Urinalysis:      Lab Results   Component Value Date    NITRU POSITIVE 02/26/2022    WBCUA 0-2 02/26/2022    BACTERIA Rare 02/26/2022    RBCUA 0-2 02/26/2022    BLOODU Negative 02/26/2022    SPECGRAV 1.025 02/26/2022    GLUCOSEU Negative 02/26/2022             ASSESSMENT AND PLAN:  60 y.o. female  with CAD, hx of CVA, hypertension, and hyperlipidemia who presented to the ED due to shaking. Alcohol withdrawal: POA  - CIWA protocol initiated  - Chlordiazepoxide scheduled  - MVI, Thiamine  - Patient reported last admission required 4 mg Ativan every hour for the first 24 hours: places her at high risk for severe DTs and need for higher level of care  - Monitor closely      Fall: POA  - Mechanical fall, prob sec to alcoholism; Cervical dystonia on chronic benzodiazepine  - Vit B12: 336  - Urine culture  -  on ETOH  - PT/OT eval      Traumatic Right sided proximal humeral neck fracture: POA  - Diagnosed at [facility] were she had presented after her fall, was discharged Home, and re-presented here with shaking, disorientation and severe right arm pain  -   At [facility], was imaged as well as put in a sling, given pain medication and a referred to o/p orthopedics.   - Continue immobilization on sling  - Continue pain control  - Ortho follow up in 1-2 weeks        Alcohol dependence  Hyponatremia: POA  Elevated liver enzyme: POA  Thrombocytopenia  - Sec to alcoholism  - Monitor labs  - Counseling  - MSW eval        Possible UTI: POA  - Presented with fall  - Has suprapubic pain and tenderness   - UA reviewed: bacteria, nitrite positive, though not much WBCs  - Urine

## 2022-02-26 NOTE — PLAN OF CARE
Problem: Falls - Risk of:  Goal: Will remain free from falls  Description: Will remain free from falls  2/26/2022 0118 by Sobeida Weinberg RN  Outcome: Ongoing   Fall precautions in place. Bed alarm activated, low position, wheels locked. Call light and belongings within reach. Patient encouraged to call with needs and concerns. Problem: Pain:  Goal: Pain level will decrease  Description: Pain level will decrease  Outcome: Ongoing   C/o R shoulder, arm pain rating 10/10. Medicated with oxycodone and toradol per MAR. Will monitor. Problem: Skin Integrity:  Goal: Absence of new skin breakdown  Description: Absence of new skin breakdown  2/26/2022 0118 by Sobeida Weinberg RN  Outcome: Ongoing   Skin intact. Patient assisted with repositioning. R arm elevated with pillow. Heels elevated. Problem: Fluid Volume - Deficit:  Goal: Absence of fluid volume deficit signs and symptoms  Description: Absence of fluid volume deficit signs and symptoms  2/26/2022 0118 by Sobeida Weinberg RN  Outcome: Ongoing   Patient encouraged to drink adequate fluids to remain hydrated. Problem: Nutrition Deficit:  Goal: Ability to achieve adequate nutritional intake will improve  Description: Ability to achieve adequate nutritional intake will improve  2/26/2022 0118 by Sobeida Weinberg RN  Outcome: Ongoing   Encourage po intake and small, frequent meals.

## 2022-02-27 LAB
A/G RATIO: 1.3 (ref 1.1–2.2)
ALBUMIN SERPL-MCNC: 3.7 G/DL (ref 3.4–5)
ALP BLD-CCNC: 66 U/L (ref 40–129)
ALT SERPL-CCNC: 24 U/L (ref 10–40)
ANION GAP SERPL CALCULATED.3IONS-SCNC: 12 MMOL/L (ref 3–16)
AST SERPL-CCNC: 75 U/L (ref 15–37)
BILIRUB SERPL-MCNC: 0.7 MG/DL (ref 0–1)
BUN BLDV-MCNC: 8 MG/DL (ref 7–20)
CALCIUM SERPL-MCNC: 9.5 MG/DL (ref 8.3–10.6)
CHLORIDE BLD-SCNC: 96 MMOL/L (ref 99–110)
CO2: 24 MMOL/L (ref 21–32)
CREAT SERPL-MCNC: <0.5 MG/DL (ref 0.6–1.2)
GFR AFRICAN AMERICAN: >60
GFR NON-AFRICAN AMERICAN: >60
GLUCOSE BLD-MCNC: 103 MG/DL (ref 70–99)
HCT VFR BLD CALC: 26.3 % (ref 36–48)
HEMOGLOBIN: 8.7 G/DL (ref 12–16)
MCH RBC QN AUTO: 28.1 PG (ref 26–34)
MCHC RBC AUTO-ENTMCNC: 33.1 G/DL (ref 31–36)
MCV RBC AUTO: 84.9 FL (ref 80–100)
PDW BLD-RTO: 18.9 % (ref 12.4–15.4)
PLATELET # BLD: 116 K/UL (ref 135–450)
PMV BLD AUTO: 8.2 FL (ref 5–10.5)
POTASSIUM SERPL-SCNC: 4.1 MMOL/L (ref 3.5–5.1)
RBC # BLD: 3.1 M/UL (ref 4–5.2)
SODIUM BLD-SCNC: 132 MMOL/L (ref 136–145)
TOTAL PROTEIN: 6.6 G/DL (ref 6.4–8.2)
WBC # BLD: 4.7 K/UL (ref 4–11)

## 2022-02-27 PROCEDURE — 85027 COMPLETE CBC AUTOMATED: CPT

## 2022-02-27 PROCEDURE — 36415 COLL VENOUS BLD VENIPUNCTURE: CPT

## 2022-02-27 PROCEDURE — 6370000000 HC RX 637 (ALT 250 FOR IP): Performed by: EMERGENCY MEDICINE

## 2022-02-27 PROCEDURE — 2580000003 HC RX 258: Performed by: INTERNAL MEDICINE

## 2022-02-27 PROCEDURE — 2580000003 HC RX 258: Performed by: PHYSICIAN ASSISTANT

## 2022-02-27 PROCEDURE — 6370000000 HC RX 637 (ALT 250 FOR IP): Performed by: INTERNAL MEDICINE

## 2022-02-27 PROCEDURE — 80053 COMPREHEN METABOLIC PANEL: CPT

## 2022-02-27 PROCEDURE — 2060000000 HC ICU INTERMEDIATE R&B

## 2022-02-27 PROCEDURE — 6360000002 HC RX W HCPCS: Performed by: INTERNAL MEDICINE

## 2022-02-27 RX ADMIN — LORAZEPAM 3 MG: 1 TABLET ORAL at 03:35

## 2022-02-27 RX ADMIN — SODIUM CHLORIDE, PRESERVATIVE FREE 10 ML: 5 INJECTION INTRAVENOUS at 19:46

## 2022-02-27 RX ADMIN — LORAZEPAM 2 MG: 1 TABLET ORAL at 21:55

## 2022-02-27 RX ADMIN — VALSARTAN 160 MG: 160 TABLET, FILM COATED ORAL at 08:38

## 2022-02-27 RX ADMIN — LORAZEPAM 2 MG: 1 TABLET ORAL at 16:47

## 2022-02-27 RX ADMIN — ENOXAPARIN SODIUM 40 MG: 100 INJECTION SUBCUTANEOUS at 08:29

## 2022-02-27 RX ADMIN — CHLORDIAZEPOXIDE HYDROCHLORIDE 10 MG: 5 CAPSULE ORAL at 08:29

## 2022-02-27 RX ADMIN — THIAMINE HCL TAB 100 MG 100 MG: 100 TAB at 08:29

## 2022-02-27 RX ADMIN — LORAZEPAM 2 MG: 1 TABLET ORAL at 23:31

## 2022-02-27 RX ADMIN — CHLORDIAZEPOXIDE HYDROCHLORIDE 10 MG: 5 CAPSULE ORAL at 19:43

## 2022-02-27 RX ADMIN — OXYCODONE 5 MG: 5 TABLET ORAL at 03:35

## 2022-02-27 RX ADMIN — LORAZEPAM 1 MG: 1 TABLET ORAL at 08:38

## 2022-02-27 RX ADMIN — OXYCODONE 5 MG: 5 TABLET ORAL at 13:11

## 2022-02-27 RX ADMIN — CEFTRIAXONE 1000 MG: 1 INJECTION, POWDER, FOR SOLUTION INTRAMUSCULAR; INTRAVENOUS at 16:42

## 2022-02-27 RX ADMIN — OXYCODONE 5 MG: 5 TABLET ORAL at 21:56

## 2022-02-27 RX ADMIN — OXYCODONE 5 MG: 5 TABLET ORAL at 08:29

## 2022-02-27 RX ADMIN — LORAZEPAM 1 MG: 1 TABLET ORAL at 10:54

## 2022-02-27 RX ADMIN — SODIUM CHLORIDE, PRESERVATIVE FREE 10 ML: 5 INJECTION INTRAVENOUS at 08:30

## 2022-02-27 RX ADMIN — CHLORDIAZEPOXIDE HYDROCHLORIDE 10 MG: 5 CAPSULE ORAL at 13:12

## 2022-02-27 RX ADMIN — OXYCODONE 5 MG: 5 TABLET ORAL at 17:50

## 2022-02-27 RX ADMIN — MULTIVITAMIN TABLET 1 TABLET: TABLET at 08:29

## 2022-02-27 RX ADMIN — LORAZEPAM 3 MG: 1 TABLET ORAL at 13:11

## 2022-02-27 RX ADMIN — SODIUM CHLORIDE, PRESERVATIVE FREE 10 ML: 5 INJECTION INTRAVENOUS at 12:18

## 2022-02-27 RX ADMIN — LORAZEPAM 2 MG: 1 TABLET ORAL at 19:43

## 2022-02-27 RX ADMIN — LORAZEPAM 2 MG: 1 TABLET ORAL at 06:02

## 2022-02-27 RX ADMIN — PANTOPRAZOLE SODIUM 40 MG: 40 TABLET, DELAYED RELEASE ORAL at 06:02

## 2022-02-27 ASSESSMENT — PAIN DESCRIPTION - PAIN TYPE
TYPE: ACUTE PAIN

## 2022-02-27 ASSESSMENT — PAIN DESCRIPTION - ORIENTATION
ORIENTATION: RIGHT
ORIENTATION: RIGHT
ORIENTATION: RIGHT;LEFT
ORIENTATION: RIGHT
ORIENTATION: RIGHT;LEFT
ORIENTATION: RIGHT;LEFT
ORIENTATION: RIGHT
ORIENTATION: RIGHT;LEFT

## 2022-02-27 ASSESSMENT — PAIN DESCRIPTION - DESCRIPTORS
DESCRIPTORS: ACHING;DISCOMFORT
DESCRIPTORS: ACHING;SORE;SHARP;THROBBING
DESCRIPTORS: ACHING;SHARP;SORE;THROBBING
DESCRIPTORS: ACHING;DISCOMFORT

## 2022-02-27 ASSESSMENT — PAIN DESCRIPTION - LOCATION
LOCATION: ARM;GENERALIZED
LOCATION: ARM;NECK
LOCATION: ARM;BACK;NECK
LOCATION: ARM;BACK;GENERALIZED
LOCATION: ARM;BACK;NECK
LOCATION: ARM;BACK;GENERALIZED;LEG
LOCATION: ARM;BACK;GENERALIZED;LEG

## 2022-02-27 ASSESSMENT — PAIN DESCRIPTION - PROGRESSION
CLINICAL_PROGRESSION: GRADUALLY WORSENING
CLINICAL_PROGRESSION: GRADUALLY WORSENING
CLINICAL_PROGRESSION: NOT CHANGED
CLINICAL_PROGRESSION: GRADUALLY WORSENING
CLINICAL_PROGRESSION: GRADUALLY IMPROVING
CLINICAL_PROGRESSION: GRADUALLY IMPROVING
CLINICAL_PROGRESSION: NOT CHANGED
CLINICAL_PROGRESSION: GRADUALLY WORSENING

## 2022-02-27 ASSESSMENT — PAIN SCALES - GENERAL
PAINLEVEL_OUTOF10: 7
PAINLEVEL_OUTOF10: 7
PAINLEVEL_OUTOF10: 8
PAINLEVEL_OUTOF10: 9
PAINLEVEL_OUTOF10: 9
PAINLEVEL_OUTOF10: 7
PAINLEVEL_OUTOF10: 6
PAINLEVEL_OUTOF10: 10
PAINLEVEL_OUTOF10: 7
PAINLEVEL_OUTOF10: 8
PAINLEVEL_OUTOF10: 8

## 2022-02-27 ASSESSMENT — PAIN DESCRIPTION - FREQUENCY
FREQUENCY: CONTINUOUS
FREQUENCY: INTERMITTENT
FREQUENCY: CONTINUOUS

## 2022-02-27 ASSESSMENT — PAIN DESCRIPTION - ONSET
ONSET: ON-GOING

## 2022-02-27 ASSESSMENT — PAIN - FUNCTIONAL ASSESSMENT
PAIN_FUNCTIONAL_ASSESSMENT: PREVENTS OR INTERFERES WITH ALL ACTIVE AND SOME PASSIVE ACTIVITIES
PAIN_FUNCTIONAL_ASSESSMENT: PREVENTS OR INTERFERES WITH MANY ACTIVE NOT PASSIVE ACTIVITIES
PAIN_FUNCTIONAL_ASSESSMENT: PREVENTS OR INTERFERES WITH MANY ACTIVE NOT PASSIVE ACTIVITIES
PAIN_FUNCTIONAL_ASSESSMENT: PREVENTS OR INTERFERES WITH ALL ACTIVE AND SOME PASSIVE ACTIVITIES
PAIN_FUNCTIONAL_ASSESSMENT: PREVENTS OR INTERFERES SOME ACTIVE ACTIVITIES AND ADLS
PAIN_FUNCTIONAL_ASSESSMENT: PREVENTS OR INTERFERES WITH MANY ACTIVE NOT PASSIVE ACTIVITIES
PAIN_FUNCTIONAL_ASSESSMENT: PREVENTS OR INTERFERES WITH MANY ACTIVE NOT PASSIVE ACTIVITIES
PAIN_FUNCTIONAL_ASSESSMENT: INTOLERABLE, UNABLE TO DO ANY ACTIVE OR PASSIVE ACTIVITIES

## 2022-02-27 NOTE — PLAN OF CARE
Problem: Falls - Risk of:  Goal: Will remain free from falls  Description: Will remain free from falls  2/27/2022 1229 by Corey Vargas RN  Outcome: Ongoing  Note: Pt continues to be a high fall risk. Pt has bed alarm in place, call light within reach, non skid socks in place. Pt has seizure precautions in place, including padded side rails as well as AVASYS activated in room. Problem: Pain:  Description: Pain management should include both nonpharmacologic and pharmacologic interventions. Goal: Control of acute pain  Description: Control of acute pain  2/27/2022 1229 by Corey Vargas RN  Outcome: Ongoing  Note: Pt has PRN pain medication ordered for relief. Pt has constant c/o pain to RUE r/t fracture.

## 2022-02-27 NOTE — DISCHARGE INSTR - COC
Continuity of Care Form    Patient Name: Delia Sheets   :  1960  MRN:  [id number]    Admit date:  2022  Discharge date:  ***    Code Status Order: Full Code   Advance Directives:      Admitting Physician:  Dajuan Almazan DO  PCP: [address] [place] [address]    Discharging Nurse: [facility] Unit/Room#: 1685/7824-16  Discharging Unit Phone Number: ***    Emergency Contact:   Extended Emergency Contact Information  Primary Emergency Contact: Paul Ulrich  Address: [facility] [address], [address] Phone: [phone]  Work Phone: [phone]  Mobile Phone: [phone]  Relation: Spouse    Past Surgical History:  Past Surgical History:   Procedure Laterality Date    BREAST SURGERY      COLONOSCOPY      COLONOSCOPY N/A 2019    COLONOSCOPY WITH BIOPSY performed by Anna Farmer MD at [address] [facility]  2019    COLONOSCOPY POLYPECTOMY SNARE/COLD BIOPSY performed by Anna Farmer MD at [address] [facility] [address], COLON, DIAGNOSTIC         Immunization History:   Immunization History   Administered Date(s) Administered    COVID-19, Rosana Pleva, Primary or Immunocompromised, PF, 100mcg/0.5mL 2021, 2021       Active Problems:  Patient Active Problem List   Diagnosis Code    Alcohol abuse F10.10    Alcohol dependence with withdrawal ([facility] F10.239       Isolation/Infection:   Isolation            No Isolation          Patient Infection Status       Infection Onset Added Last Indicated Last Indicated By Review Planned Expiration Resolved Resolved By    None active    Resolved    C-diff Rule Out 05/15/21 05/17/21 05/15/21 C. difficile toxin Molecular (Ordered)   21 Rule-Out Test Resulted    C-diff Rule Out 05/15/21 05/15/21 05/15/21 Clostridium difficile toxin/antigen (Ordered)   05/15/21 Rule-Out Test Resulted            Nurse Assessment:  Last Vital Signs: /79   Pulse 77 Temp 99.2 °F (37.3 °C) (Oral)   Resp 22   Ht 5' 10\" (1.778 m)   Wt 236 lb 1.8 oz (107.1 kg)   SpO2 94%   BMI 33.88 kg/m²     Last documented pain score (0-10 scale): Pain Level: 7  Last Weight:   Wt Readings from Last 1 Encounters:   22 236 lb 1.8 oz (107.1 kg)     Mental Status:  {IP PT MENTAL STATUS:62402}    IV Access:  { EMILEE IV ACCESS:117563603}    Nursing Mobility/ADLs:  Walking   {CHP DME FZWD:829917820}  Transfer  {CHP DME PUJV:638877420}  Bathing  {CHP DME VBQC:759527077}  Dressing  {CHP DME ZMCJ:255523981}  Toileting  {CHP DME ZPBW:102270325}  Feeding  {CHP DME VMNT:291146268}  Med Admin  {CHP DME UKVN:241434938}  Med Delivery   { EMILEE MED Delivery:355816258}    Wound Care Documentation and Therapy:        Elimination:  Continence: Bowel: {YES / WE:87695}  Bladder: {YES / DL:10665}  Urinary Catheter: {Urinary Catheter:993029698}   Colostomy/Ileostomy/Ileal Conduit: {YES / ZB:70993}       Date of Last BM: ***    Intake/Output Summary (Last 24 hours) at 2022 1659  Last data filed at 2022 1042  Gross per 24 hour   Intake 1210 ml   Output 1950 ml   Net -740 ml     I/O last 3 completed shifts: In: 1870 [P.O.:1860;  I.V.:10]  Out:  [Urine:1950]    Safety Concerns:     [employer] Safety Concerns:071808563}    Impairments/Disabilities:      [employer] Impairments/Disabilities:784548802}    Nutrition Therapy:  Current Nutrition Therapy:   [employer] Diet List:896193940}    Routes of Feeding: {CHP DME Other Feedings:791677958}  Liquids: {Slp liquid thickness:24962}  Daily Fluid Restriction: {CHP DME Yes amt example:065911943}  Last Modified Barium Swallow with Video (Video Swallowing Test): {Done Not Done [facility]:001825735}    Treatments at the Time of Hospital Discharge:   Respiratory Treatments: ***  Oxygen Therapy:  {Therapy; copd oxygen:58457}  Ventilator:    {AMBROSIO DUTTON Vent APS}    Rehab Therapies: {THERAPEUTIC INTERVENTION:8999761350}  Weight Bearing Status/Restrictions: {AMBROSIO DUTTON Weight Bearin}  Other Medical Equipment (for information only, NOT a DME order):  {EQUIPMENT:311505028}  Other Treatments: ***    Patient's personal belongings (please select all that are sent with patient):  {CHP DME Belongings:761607282}    RN SIGNATURE:  {Esignature:233319798}    CASE MANAGEMENT/SOCIAL WORK SECTION    Inpatient Status Date: ***    Readmission Risk Assessment Score:  Readmission Risk              Risk of Unplanned Readmission:  10           Discharging to Facility/ Agency   Name:   [facility] [address]          [address] [place] [address]       Phone: [phone]       Fax: [phone]            / signature: Electronically signed by Chino Campos RN on 3/1/22 at 4:02 PM EST    PHYSICIAN SECTION    Prognosis: {Prognosis:7404406783}    Condition at Discharge: [address] [place] [address] Patient Condition:540755407}    Rehab Potential (if transferring to Rehab): {Prognosis:2430497077}    Recommended Labs or Other Treatments After Discharge: ***    Physician Certification: I certify the above information and transfer of Jovita Blair  is necessary for the continuing treatment of the diagnosis listed and that she requires {Admit to Appropriate Level of Care:07772} for {GREATER/LESS:922373599} 30 days.      Update Admission H&P: {CHP DME Changes in SDEIO:830620675}    PHYSICIAN SIGNATURE:  {Esignature:544598481}

## 2022-02-27 NOTE — PROGRESS NOTES
Hospital Medicine Progress Note     PCP: Lam Quiros    Date of Admission: 2/24/2022          Chief Complaint:  Alcoholism, shaking        History Of Present Illness:      60 y.o. female  with CAD, hx of CVA, hypertension, and hyperlipidemia who presented to the ED due to shaking. Patient was recently admitted at [facility] where she was diagnosed with right arm fracture. The patient is wearing a sling. Patient reported that she had few drinks with her friends for the Super Bowl and then since I noticed that she is back on binge drinking daily at least 2 shots of bourbon every 3 hours. Patient reported that her last drink was around 3 PM day of presentation. Patient reported that she also had a fall day of admission,  where she tripped over a cardboard box lost balance. She was taken by ambulance to [facility] where she was diagnosed with a right sided proximal humeral neck fracture. At the time the patient was imaged as well as put in a sling, given pain medication and a referral to orthopedics. Appears she was discharged Home. After getting home from the hospital the patient was incredibly shaky and she had another shot of bourbon before presenting to the emergency department here due to feeling nauseous, shaky, \"disoriented\" as well as 10/10 pain, constant, radiating up towards her right shoulder which is sharp in nature. No known relieving exacerbating factor no strength fever chills nausea vomiting chest pain abdominal pain or dysuria. Patient does report that she is feeling very anxious and has had a gradual in the past but never had a seizure or DTs. Patient does report that she did require 4 mg Ativan every hour for the first 24 hours when she was detoxing. On presentation, she was afebrile. BP was 181/102. She wasmildly tachycardic at 109. Lab-work showed no leucocytosis. Hgb 10.8, Platelet 529. Na was 129, Lactate 2.3.  Liver enzymes showed AST elevated at 79, T bili elevated at 1.1; Normal ALT and ALP. Lipase was normal at 49. ETOH: not detected. CXR: Elevated right hemidiaphragm with low right lung volume. EKG showed NSR, Q waves in septal leads. Interval HPI  No new issues    No chest pain    No dyspnea    No fever    No seizures    S/O at bedside. Pxt complaining oc constipation, but declining meds offered, states she wants her linzess which she feels will work. Requesting to DC librium    Complaining about too many pills. Indications discussed with pxt         Medications : Reviewed        Allergies:  Atorvastatin, Iodine, Pravastatin, Rosuvastatin, Shellfish allergy, Sulfa antibiotics, Tylenol [acetaminophen], and Lunesta [eszopiclone]        REVIEW OF SYSTEMS: as in HPI  All other systems reviewed and are negative        PHYSICAL EXAM PERFORMED:    /68   Pulse 70   Temp 97.5 °F (36.4 °C) (Oral)   Resp 20   Ht 5' 10\" (1.778 m)   Wt 236 lb 1.8 oz (107.1 kg)   SpO2 95%   BMI 33.88 kg/m²     General appearance: in no acute distress  HEENT:   atraumatic, sclera anicteric, Conjunctivae clear. Respiratory:minimal accessory muscle usage, Normal respiratory effort. Cardiovascular:  Regular rate and rhythm  Abdomen: Soft, non-tender, non-distended with normal bowel sounds. Musculoskeletal:    Right upper extremity is immobilized in arm sling. Neurologic: Alert and oriented x4, no new focal sensory/motor deficits.        Labs:     Recent Labs     02/25/22 0511 02/26/22 0527 02/27/22  0541   WBC 6.1 4.6 4.7   HGB 9.7* 9.6* 8.7*   HCT 29.8* 29.6* 26.3*   * 107* 116*     Recent Labs     02/25/22  0511 02/26/22 0527 02/27/22  0541   * 129* 132*   K 4.0 3.9 4.1   CL 93* 92* 96*   CO2 23 27 24   BUN 8 9 8   CREATININE <0.5* <0.5* <0.5*   CALCIUM 9.9 9.9 9.5     Recent Labs     02/25/22  0016 02/25/22  0016 02/25/22  0511 02/26/22  0527 02/27/22  0541   AST 92*   < > 79* 64* 75*   ALT 35   < > 32 25 24 BILIDIR 0.3  --   --   --   --    BILITOT 1.1*   < > 1.1* 1.2* 0.7   ALKPHOS 93   < > 87 78 66    < > = values in this interval not displayed. No results for input(s): INR in the last 72 hours. Recent Labs     02/25/22  0016 02/25/22  0511   TROPONINI <0.01 <0.01       Urinalysis:      Lab Results   Component Value Date    NITRU POSITIVE 02/26/2022    WBCUA 0-2 02/26/2022    BACTERIA Rare 02/26/2022    RBCUA 0-2 02/26/2022    BLOODU Negative 02/26/2022    SPECGRAV 1.025 02/26/2022    GLUCOSEU Negative 02/26/2022             ASSESSMENT AND PLAN:  60 y.o. female  with CAD, hx of CVA, hypertension, and hyperlipidemia who presented to the ED due to shaking. Alcohol withdrawal: POA  - CIWA protocol initiated  - Chlordiazepoxide scheduled: Pxt requesting discontnuation  - MVI, Thiamine  - Patient reported last admission required 4 mg Ativan every hour for the first 24 hours: places her at high risk for severe DTs and need for higher level of care  - Monitor closely      Fall: POA  - Mechanical fall, prob sec to alcoholism; Cervical dystonia on chronic benzodiazepine  - Vit B12: 336  - Urine culture  -  on ETOH  - PT/OT eval      Traumatic Right sided proximal humeral neck fracture: POA  - Diagnosed at [facility] were she had presented after her fall, was discharged Home, and re-presented here with shaking, disorientation and severe right arm pain  -   At [facility], was imaged as well as put in a sling, given pain medication and a referred to o/p orthopedics.   - Continue immobilization on sling  - Continue pain control  - Ortho follow up in 1-2 weeks        Alcohol dependence  Hyponatremia: POA  Elevated liver enzyme: POA  Thrombocytopenia  - Sec to alcoholism  - Monitor labs  - Counseling  - MSW eval        Possible UTI: POA  - Presented with fall  - Has suprapubic pain and tenderness   - UA reviewed: bacteria, nitrite positive, though not much WBCs  - Urine culture  - Empiric antibx for now        Chronic Normocytic anemia:  - Update work up: Iron panel, Hemoccult ordered  - Updated B12, Folate      Essential Hypertension/ hypertensive urgency: POA  - BP was 181/102 on presentation  - Improved. Continue home medication: ARB  - Monitor and adjust meds as needed      Chronic HFpEF.  POA  - Reported intermitted dyspnea, echo pending  - Not on lasix at home  - No echo in years: ordered      CAD: POA -Hx of subclinical coronary artery disease based on cardiac CTA 8/18/2016  Familial hypercholesterolemia intolerant to statin therapy: POA   - EKG: NSR, old anterior infarct  - On just ARB at home  - Not on other cardiac meds  - Appears was on Repatha in the past  - Will need F/u with Dr. Leon Salts [facility])        Anxiety Disorder: POA  Cervical dystonia: POA  - Benzodiazepine dependent: Reported on Ativan 2 mg nightly  - Discussed with her need to discuss with her regular providers, risk/benefits of  chronic benzo use which I have discussed with her,  taqueria in setting of her alcoholism        HX of CVA:   - Will need optimization of risk factor mgt  - F/u with PCP        DVT Prophylaxis: lovenox        Disposition:   Pending progress  PT/OT ian Carbajal MD

## 2022-02-27 NOTE — PLAN OF CARE
Problem: Falls - Risk of:  Goal: Will remain free from falls  Description: Will remain free from falls  2/27/2022 0433 by Raghav Leiva RN  Outcome: Ongoing  Note: Pt is a high fall risk (see Deepika Sung Fall Screening). Pt has fall risk measures in place including fall risk bracelet, fall risk sign, non-slip socks. Pt bed is in low position, bed alarm on, bed wheels locked, call light within reach, bedside table within reach, 4/4 bed rails. Problem: Falls - Risk of:  Goal: Absence of physical injury  Outcome: Ongoing  Description: Absence of physical injury  2/27/2022 0433 by Raghav Leiva RN  Note: Pt is on seizure and fall precautions are in place. 4/4 bed rails are padded. CIWA screening performed as per protocol. Pt is on strict bedrest as ordered. Problem: Pain:  Goal: Control of acute pain  Description: Control of acute pain  2/27/2022 0433 by Raghav Leiva RN  Outcome: Ongoing  Note: Pt rated acute pain between 8-10. Pt pain is managed with pain medications as prescribed. Pt. Stated that pain is gradually improving. Pt goal for pain is no pain. Will continue to monitor. Problem: Pain:  Goal: Pain level will decrease  Outcome: Ongoing  Description: Pain level will decrease    Problem: Skin Integrity:  Goal: Absence of new skin breakdown  Description: Absence of new skin breakdown  2/27/2022 0433 by Raghav Leiva RN  Outcome: Ongoing  Note: Pt will not manifested new skin breakdown upon hospitalization    Problem: Fluid Volume - Deficit:  Goal: Absence of fluid volume deficit signs and symptoms  Description: Absence of fluid volume deficit signs and symptoms  2/27/2022 0433 by Raghav Leiva RN  Outcome: Ongoing  Note: Pt I/Os are monitored to prevent fluid volume deficit. Pt does not shows any sign and symptoms of fluid deficit during the shift. Will continue to monitor.

## 2022-02-28 ENCOUNTER — APPOINTMENT (OUTPATIENT)
Dept: ULTRASOUND IMAGING | Age: 62
DRG: 897 | End: 2022-02-28
Payer: COMMERCIAL

## 2022-02-28 LAB
A/G RATIO: 1.2 (ref 1.1–2.2)
ALBUMIN SERPL-MCNC: 3.6 G/DL (ref 3.4–5)
ALP BLD-CCNC: 76 U/L (ref 40–129)
ALT SERPL-CCNC: 26 U/L (ref 10–40)
ANION GAP SERPL CALCULATED.3IONS-SCNC: 8 MMOL/L (ref 3–16)
AST SERPL-CCNC: 52 U/L (ref 15–37)
BILIRUB SERPL-MCNC: 0.7 MG/DL (ref 0–1)
BUN BLDV-MCNC: 7 MG/DL (ref 7–20)
CALCIUM SERPL-MCNC: 9.7 MG/DL (ref 8.3–10.6)
CHLORIDE BLD-SCNC: 96 MMOL/L (ref 99–110)
CO2: 32 MMOL/L (ref 21–32)
CREAT SERPL-MCNC: <0.5 MG/DL (ref 0.6–1.2)
GFR AFRICAN AMERICAN: >60
GFR NON-AFRICAN AMERICAN: >60
GLUCOSE BLD-MCNC: 104 MG/DL (ref 70–99)
HCT VFR BLD CALC: 28.7 % (ref 36–48)
HEMOGLOBIN: 9.3 G/DL (ref 12–16)
MCH RBC QN AUTO: 27.6 PG (ref 26–34)
MCHC RBC AUTO-ENTMCNC: 32.5 G/DL (ref 31–36)
MCV RBC AUTO: 85 FL (ref 80–100)
PDW BLD-RTO: 18.9 % (ref 12.4–15.4)
PLATELET # BLD: 136 K/UL (ref 135–450)
PMV BLD AUTO: 8.3 FL (ref 5–10.5)
POTASSIUM SERPL-SCNC: 3.7 MMOL/L (ref 3.5–5.1)
RBC # BLD: 3.38 M/UL (ref 4–5.2)
SODIUM BLD-SCNC: 136 MMOL/L (ref 136–145)
TOTAL PROTEIN: 6.7 G/DL (ref 6.4–8.2)
WBC # BLD: 4.9 K/UL (ref 4–11)

## 2022-02-28 PROCEDURE — 97530 THERAPEUTIC ACTIVITIES: CPT

## 2022-02-28 PROCEDURE — 2580000003 HC RX 258: Performed by: PHYSICIAN ASSISTANT

## 2022-02-28 PROCEDURE — 6360000002 HC RX W HCPCS: Performed by: INTERNAL MEDICINE

## 2022-02-28 PROCEDURE — 2580000003 HC RX 258: Performed by: INTERNAL MEDICINE

## 2022-02-28 PROCEDURE — 36415 COLL VENOUS BLD VENIPUNCTURE: CPT

## 2022-02-28 PROCEDURE — 76705 ECHO EXAM OF ABDOMEN: CPT

## 2022-02-28 PROCEDURE — 2060000000 HC ICU INTERMEDIATE R&B

## 2022-02-28 PROCEDURE — 85027 COMPLETE CBC AUTOMATED: CPT

## 2022-02-28 PROCEDURE — 6370000000 HC RX 637 (ALT 250 FOR IP): Performed by: EMERGENCY MEDICINE

## 2022-02-28 PROCEDURE — 97535 SELF CARE MNGMENT TRAINING: CPT

## 2022-02-28 PROCEDURE — 97116 GAIT TRAINING THERAPY: CPT

## 2022-02-28 PROCEDURE — 6370000000 HC RX 637 (ALT 250 FOR IP): Performed by: INTERNAL MEDICINE

## 2022-02-28 PROCEDURE — 80053 COMPREHEN METABOLIC PANEL: CPT

## 2022-02-28 RX ORDER — MECOBALAMIN 5000 MCG
5 TABLET,DISINTEGRATING ORAL NIGHTLY
Status: DISCONTINUED | OUTPATIENT
Start: 2022-02-28 | End: 2022-03-01 | Stop reason: HOSPADM

## 2022-02-28 RX ORDER — KETOROLAC TROMETHAMINE 15 MG/ML
15 INJECTION, SOLUTION INTRAMUSCULAR; INTRAVENOUS ONCE
Status: COMPLETED | OUTPATIENT
Start: 2022-02-28 | End: 2022-02-28

## 2022-02-28 RX ADMIN — OXYCODONE 5 MG: 5 TABLET ORAL at 10:30

## 2022-02-28 RX ADMIN — PANTOPRAZOLE SODIUM 40 MG: 40 TABLET, DELAYED RELEASE ORAL at 06:29

## 2022-02-28 RX ADMIN — SENNOSIDES AND DOCUSATE SODIUM 2 TABLET: 50; 8.6 TABLET ORAL at 10:24

## 2022-02-28 RX ADMIN — ENOXAPARIN SODIUM 40 MG: 100 INJECTION SUBCUTANEOUS at 10:26

## 2022-02-28 RX ADMIN — THIAMINE HCL TAB 100 MG 100 MG: 100 TAB at 10:25

## 2022-02-28 RX ADMIN — OXYCODONE 5 MG: 5 TABLET ORAL at 02:07

## 2022-02-28 RX ADMIN — LORAZEPAM 1 MG: 1 TABLET ORAL at 23:58

## 2022-02-28 RX ADMIN — SODIUM CHLORIDE, PRESERVATIVE FREE 10 ML: 5 INJECTION INTRAVENOUS at 10:26

## 2022-02-28 RX ADMIN — OXYCODONE 5 MG: 5 TABLET ORAL at 17:22

## 2022-02-28 RX ADMIN — SODIUM CHLORIDE, PRESERVATIVE FREE 10 ML: 5 INJECTION INTRAVENOUS at 20:05

## 2022-02-28 RX ADMIN — CHLORDIAZEPOXIDE HYDROCHLORIDE 10 MG: 5 CAPSULE ORAL at 10:24

## 2022-02-28 RX ADMIN — OXYCODONE 5 MG: 5 TABLET ORAL at 06:28

## 2022-02-28 RX ADMIN — LORAZEPAM 1 MG: 1 TABLET ORAL at 04:40

## 2022-02-28 RX ADMIN — OXYCODONE 5 MG: 5 TABLET ORAL at 22:04

## 2022-02-28 RX ADMIN — LORAZEPAM 1 MG: 1 TABLET ORAL at 02:07

## 2022-02-28 RX ADMIN — PROMETHAZINE HYDROCHLORIDE 12.5 MG: 25 TABLET ORAL at 14:42

## 2022-02-28 RX ADMIN — LORAZEPAM 2 MG: 1 TABLET ORAL at 11:07

## 2022-02-28 RX ADMIN — LORAZEPAM 2 MG: 1 TABLET ORAL at 20:04

## 2022-02-28 RX ADMIN — CHLORDIAZEPOXIDE HYDROCHLORIDE 10 MG: 5 CAPSULE ORAL at 14:39

## 2022-02-28 RX ADMIN — LORAZEPAM 1 MG: 1 TABLET ORAL at 14:39

## 2022-02-28 RX ADMIN — KETOROLAC TROMETHAMINE 15 MG: 15 INJECTION, SOLUTION INTRAMUSCULAR; INTRAVENOUS at 21:06

## 2022-02-28 RX ADMIN — Medication 5 MG: at 22:33

## 2022-02-28 RX ADMIN — CHLORDIAZEPOXIDE HYDROCHLORIDE 10 MG: 5 CAPSULE ORAL at 20:04

## 2022-02-28 RX ADMIN — LORAZEPAM 1 MG: 1 TABLET ORAL at 06:41

## 2022-02-28 RX ADMIN — CEFTRIAXONE 1000 MG: 1 INJECTION, POWDER, FOR SOLUTION INTRAMUSCULAR; INTRAVENOUS at 15:26

## 2022-02-28 RX ADMIN — VALSARTAN 160 MG: 160 TABLET, FILM COATED ORAL at 10:25

## 2022-02-28 RX ADMIN — MULTIVITAMIN TABLET 1 TABLET: TABLET at 10:24

## 2022-02-28 ASSESSMENT — PAIN - FUNCTIONAL ASSESSMENT
PAIN_FUNCTIONAL_ASSESSMENT: PREVENTS OR INTERFERES WITH MANY ACTIVE NOT PASSIVE ACTIVITIES
PAIN_FUNCTIONAL_ASSESSMENT: PREVENTS OR INTERFERES SOME ACTIVE ACTIVITIES AND ADLS
PAIN_FUNCTIONAL_ASSESSMENT: PREVENTS OR INTERFERES WITH ALL ACTIVE AND SOME PASSIVE ACTIVITIES
PAIN_FUNCTIONAL_ASSESSMENT: PREVENTS OR INTERFERES SOME ACTIVE ACTIVITIES AND ADLS

## 2022-02-28 ASSESSMENT — PAIN DESCRIPTION - ONSET
ONSET: ON-GOING

## 2022-02-28 ASSESSMENT — PAIN DESCRIPTION - ORIENTATION
ORIENTATION: RIGHT

## 2022-02-28 ASSESSMENT — PAIN DESCRIPTION - PAIN TYPE
TYPE: ACUTE PAIN

## 2022-02-28 ASSESSMENT — PAIN SCALES - GENERAL
PAINLEVEL_OUTOF10: 8
PAINLEVEL_OUTOF10: 10
PAINLEVEL_OUTOF10: 9
PAINLEVEL_OUTOF10: 10
PAINLEVEL_OUTOF10: 8
PAINLEVEL_OUTOF10: 10
PAINLEVEL_OUTOF10: 7
PAINLEVEL_OUTOF10: 8
PAINLEVEL_OUTOF10: 7
PAINLEVEL_OUTOF10: 8
PAINLEVEL_OUTOF10: 10

## 2022-02-28 ASSESSMENT — PAIN DESCRIPTION - LOCATION
LOCATION: ARM;BACK;NECK

## 2022-02-28 ASSESSMENT — PAIN DESCRIPTION - FREQUENCY
FREQUENCY: CONTINUOUS

## 2022-02-28 ASSESSMENT — PAIN DESCRIPTION - PROGRESSION
CLINICAL_PROGRESSION: GRADUALLY IMPROVING
CLINICAL_PROGRESSION: GRADUALLY WORSENING
CLINICAL_PROGRESSION: GRADUALLY IMPROVING
CLINICAL_PROGRESSION: GRADUALLY WORSENING
CLINICAL_PROGRESSION: GRADUALLY WORSENING
CLINICAL_PROGRESSION: GRADUALLY IMPROVING
CLINICAL_PROGRESSION: GRADUALLY WORSENING
CLINICAL_PROGRESSION: GRADUALLY WORSENING

## 2022-02-28 ASSESSMENT — PAIN DESCRIPTION - DESCRIPTORS
DESCRIPTORS: ACHING;DISCOMFORT

## 2022-02-28 NOTE — PLAN OF CARE
Problem: Falls - Risk of:  Goal: Will remain free from falls  Description: Will remain free from falls  2/28/2022 0139 by Sharie Dancer, RN  Outcome: Ongoing  Note: Pt remain free from fall this shift. Pt has fall risk measures in place including fall risk bracelet, fall risk sign, fall risk cloth, non-slip socks. Pt bed is in low position, bed alarm on, bed wheels locked, call light and belongings within reach. Pt has seizure precautions in place, including padded side rails as well as AVASYS activated in room. Problem: Pain:  Goal: Pain level will decrease  Description: Pain level will decrease  Outcome: Ongoing  Note: Pt rated pain between 8-10 during this shift. Pt is given pain medications as prescribed and provided non-pharmaceutical interventions including encouraging her to relax, rest, and repositioning.

## 2022-02-28 NOTE — CARE COORDINATION
Case Management Assessment           Daily Note                 Date/ Time of Note: 2/28/2022 3:42 PM         Note completed by: Patricia Bustamante RN    Patient Name: Daniel Gamboa  YOB: 1960    Diagnosis:Alcohol dependence with withdrawal ([facility] [F10.239]  Alcohol withdrawal syndrome with perceptual disturbance [facility]) [F10.232]  Patient Admission Status: Inpatient    Date of Admission:2/24/2022 11:37 PM Length of Stay: 3 GLOS: GMLOS: 3.4    Current Plan of Care: IV abx, CIWA  ________________________________________________________________________________________  PT AM-PAC: 18 / 24 per last evaluation on: 2/28    OT AM-PAC: 18 / 24 per last evaluation on: 2/28    DME Needs for discharge: possibly shower chair  ________________________________________________________________________________________  Discharge Plan: Home with [address]: tbd    Tentative discharge date: 2/29    Current barriers to discharge: medical clearance    Referrals completed: [address]: [facility]      ________________________________________________________________________________________  Case Management Notes:  Patient is from home with spouse, plans to return home with [address], referral to [facility]. Spouse to transport home    Shalini Quevedo and her family were provided with choice of provider; she and her family are in agreement with the discharge plan.     Care Transition Patient: Nicole Bustamante RN  [facility]  Case Management Department  Ph: [phone]  Fax: [phone]

## 2022-02-28 NOTE — PROGRESS NOTES
Hospital Medicine Progress Note     PCP: Petey Aguilar    Date of Admission: 2/24/2022          Chief Complaint:  Alcoholism, shaking        History Of Present Illness:      60 y.o. female  with CAD, hx of CVA, hypertension, and hyperlipidemia who presented to the ED due to shaking. Patient was recently admitted at [facility] where she was diagnosed with right arm fracture. The patient is wearing a sling. Patient reported that she had few drinks with her friends for the Super Bowl and then since I noticed that she is back on binge drinking daily at least 2 shots of bourbon every 3 hours. Patient reported that her last drink was around 3 PM day of presentation. Patient reported that she also had a fall day of admission,  where she tripped over a cardboard box lost balance. She was taken by ambulance to [facility] where she was diagnosed with a right sided proximal humeral neck fracture. At the time the patient was imaged as well as put in a sling, given pain medication and a referral to orthopedics. Appears she was discharged Home. After getting home from the hospital the patient was incredibly shaky and she had another shot of bourbon before presenting to the emergency department here due to feeling nauseous, shaky, \"disoriented\" as well as 10/10 pain, constant, radiating up towards her right shoulder which is sharp in nature. No known relieving exacerbating factor no strength fever chills nausea vomiting chest pain abdominal pain or dysuria. Patient does report that she is feeling very anxious and has had a gradual in the past but never had a seizure or DTs. Patient does report that she did require 4 mg Ativan every hour for the first 24 hours when she was detoxing. On presentation, she was afebrile. BP was 181/102. She wasmildly tachycardic at 109. Lab-work showed no leucocytosis. Hgb 10.8, Platelet 468. Na was 129, Lactate 2.3.  Liver enzymes showed AST elevated at 79, T bili elevated at 1.1; Normal ALT and ALP. Lipase was normal at 49. ETOH: not detected. CXR: Elevated right hemidiaphragm with low right lung volume. EKG showed NSR, Q waves in septal leads. Interval HPI  No new issues    No chest pain    No dyspnea    No fever    No seizures    S/O at bedside. Pxt complaining of constipation, but declining meds offered, states she wants her linzess which she feels will work. Encouraged to try current meds. Requesting to DC librium    Complaining about too many pills. Indications discussed with pxt         Medications : Reviewed        Allergies:  Atorvastatin, Iodine, Pravastatin, Rosuvastatin, Shellfish allergy, Sulfa antibiotics, Tylenol [acetaminophen], and Lunesta [eszopiclone]        REVIEW OF SYSTEMS: as in HPI  All other systems reviewed and are negative        PHYSICAL EXAM PERFORMED:    /76   Pulse 64   Temp 98.1 °F (36.7 °C) (Oral)   Resp 18   Ht 5' 10\" (1.778 m)   Wt 232 lb 9.4 oz (105.5 kg)   SpO2 92%   BMI 33.37 kg/m²     General appearance: in no acute distress  HEENT:   atraumatic, sclera anicteric, Conjunctivae clear. Respiratory:minimal accessory muscle usage, Normal respiratory effort. Cardiovascular:  Regular rate and rhythm  Abdomen: Soft, non-tender, non-distended with normal bowel sounds. Musculoskeletal:    Right upper extremity is immobilized in arm sling. Neurologic: Alert and oriented x4, no new focal sensory/motor deficits.        Labs:     Recent Labs     02/26/22 0527 02/27/22 0541 02/28/22 0458   WBC 4.6 4.7 4.9   HGB 9.6* 8.7* 9.3*   HCT 29.6* 26.3* 28.7*   * 116* 136     Recent Labs     02/26/22 0527 02/27/22 0541 02/28/22 0458   * 132* 136   K 3.9 4.1 3.7   CL 92* 96* 96*   CO2 27 24 32   BUN 9 8 7   CREATININE <0.5* <0.5* <0.5*   CALCIUM 9.9 9.5 9.7     Recent Labs     02/26/22 0527 02/27/22 0541 02/28/22 0458   AST 64* 75* 52*   ALT 25 24 26   BILITOT 1.2* 0.7 0.7 ALKPHOS 78 66 76     No results for input(s): INR in the last 72 hours. No results for input(s): Adriana Tejeda in the last 72 hours. Urinalysis:      Lab Results   Component Value Date    NITRU POSITIVE 02/26/2022    WBCUA 0-2 02/26/2022    BACTERIA Rare 02/26/2022    RBCUA 0-2 02/26/2022    BLOODU Negative 02/26/2022    SPECGRAV 1.025 02/26/2022    GLUCOSEU Negative 02/26/2022             ASSESSMENT AND PLAN:  60 y.o. female  with CAD, hx of CVA, hypertension, and hyperlipidemia who presented to the ED due to shaking. Alcohol withdrawal: POA  - CIWA protocol initiated  - Chlordiazepoxide scheduled: Pxt requesting discontnuation  - MVI, Thiamine  - Patient reported last admission required 4 mg Ativan every hour for the first 24 hours        Fall: POA  - Mechanical fall, prob sec to alcoholism; Cervical dystonia on chronic benzodiazepine  - Vit B12: 336  - Treat possible UTI  -  on ETOH  - PT/OT eval: Home with [facility]       Traumatic Right sided proximal humeral neck fracture: POA  - Diagnosed at [facility] were she had presented after her fall, was discharged Home, and re-presented here with shaking, disorientation and severe right arm pain  -   At [facility], was imaged as well as put in a sling, given pain medication and a referred to o/p orthopedics.   - Ortho consulted. - Continue immobilization on sling  - Continue pain control  - Ortho follow up in 1-2 weeks  - Home PT/OT        Alcohol dependence  Hyponatremia: POA  Elevated liver enzyme: POA  Thrombocytopenia  - Sec to alcoholism  - Monitor labs  - Counseling  - MSW eval        Possible UTI: POA  - Presented with fall  - Had suprapubic pain and tenderness  As well as urinary frequency and urgency  - UA reviewed: bacteria, nitrite positive, though not much WBCs  - Urine culture ordered 2/26. Called Micro lab Cass[facility] Whitt 2/28/22. Specimen still at [facility]. - Empiric antibx for now.  Probably switch to empiric PO with logistic issues re ordered urine test        Chronic Normocytic anemia:  - Update work up: Iron panel, Hemoccult ordered  - Updated B12, Folate: dot deficient        Essential Hypertension/ hypertensive urgency: POA  - BP was 181/102 on presentation  - Improved. Continue home medication: ARB  - Monitor and adjust meds as needed        Chronic HFpEF.  POA  - Reported intermitted dyspnea, echo pending  - Not on lasix at home  - No echo in years: ordered        CAD: POA -Hx of subclinical coronary artery disease based on cardiac CTA 8/18/2016  Familial hypercholesterolemia intolerant to statin therapy: POA   - EKG: NSR, old anterior infarct  - On just ARB at home  - Not on other cardiac meds  - Appears was on Repatha in the past  - Will need F/u with Dr. Jake Hernandez [facility])        Anxiety Disorder: POA  Cervical dystonia: POA  - Benzodiazepine dependent: Reported on Ativan 2 mg at Home as well as Ambien  - Discussed with her need to discuss with her regular providers, risk/benefits of chronic benzo use,  taqueria in setting of her alcoholism        HX of CVA:   - Will need optimization of risk factor mgt  - F/u with PCP        DVT Prophylaxis: lovenox        Disposition:   Pending progress re her ETOH withdrawal.   Likely Home within 1-2 days  PT/OT eval : Home with Radha Davis MD

## 2022-02-28 NOTE — PROGRESS NOTES
Physical Therapy  Facility/Department: [place] [address] [facility]  Daily Treatment Note  NAME: Lucía Bueno  : 1960  MRN: [id number]    Date of Service: 2022    Discharge Recommendations:  Lucía Bueno scored a 18/24 on the AM-PAC short mobility form. Current research shows that an AM-PAC score of 18 or greater is typically associated with a discharge to the patient's home setting. Based on the patient's AM-PAC score and their current functional mobility deficits, it is recommended that the patient have 2-3 sessions per week of Physical Therapy at d/c to increase the patient's independence. At this time, this patient demonstrates the endurance and safety to discharge home with HHPT and a follow up treatment frequency of 2-3x/wk. Please see assessment section for further patient specific details. If patient discharges prior to next session this note will serve as a discharge summary. Please see below for the latest assessment towards goals. HOME HEALTH CARE: LEVEL 1 STANDARD    - Initial home health evaluation to occur within 24-48 hours, in patient home   - Therapy to evaluate with goal of regaining prior level of functioning   - Therapy to evaluate if patient has [address] needs for personal care          PT Equipment Recommendations  Equipment Needed: No    Assessment   Body structures, Functions, Activity limitations: Decreased functional mobility ; Increased pain;Decreased balance;Decreased strength;Decreased safe awareness;Decreased endurance  Assessment: Pt self-limiting throughout session, stating she is unable to attempt most tasks without PT/OT and  holding onto her/assisting her, despite pt only needing Franci for most tasks. Pt declining further ambulation or stair training after trip to the bathroom, stating her legs are too weak to attempt.   Anticipate continued progress with mobility while in hospital.  Pt will benefit from continued skilled therapy to maximize safety and independence. PT recommends 24 hr supervision/assist from  and HHPT for increased safety. PT recommends pt initially stay on first floor of home ( states this is possible) and wait to attempt stairs to upper level until she has regained some strength or until HHPT is able to assist her. Treatment Diagnosis: impaired mobility associated with alcohol dependence with withdrawal  Patient Education: Current functional status, HHPT, appropriate use of purewick, will require reinforcement  REQUIRES PT FOLLOW UP: Yes  Activity Tolerance  Activity Tolerance: Patient limited by fatigue;Patient limited by endurance  Activity Tolerance: pt continues to have anxiety regarding attempting all activity throughout session, especially when attempting to perform anything without her  holding on to her, requiring emotional support     Patient Diagnosis(es): The encounter diagnosis was Alcohol withdrawal syndrome with perceptual disturbance ([address] has a past medical history of C. difficile diarrhea, CAD (coronary artery disease), Cervical dystonia, CHF (congestive heart failure) ([facility] [address] Dental crown present, Fatty liver, Hyperlipidemia, Hypertension, Lichen sclerosus, and PONV (postoperative nausea and vomiting). has a past surgical history that includes Breast surgery; Endoscopy, colon, diagnostic; Colonoscopy; Endometrial ablation; Colonoscopy (N/A, 7/2/2019); and Colonoscopy (7/2/2019). Restrictions  Position Activity Restriction  Other position/activity restrictions: Up with assist, Sz precautions; RUE in sling  Subjective   General  Chart Reviewed: Yes  Additional Pertinent Hx: Patient is a 59 y/o female admitted 2/24 with alcohol withdrawal and s/p fall. Patient went to [facility] after fall where x-rays were positive for right humerus fracture. Patient placed in sling and discharged to follow up with ortho as outpatient. PMH significant for CAD, CHF, HLD, HTN, cervical dystonia.   Family / Caregiver Present: Yes ()  Referring Practitioner: Alethea Garcia DO  Subjective  Subjective: \"I haven't been out of bed in over two days. \"  General Comment  Comments: Pt found supine in bed upon PT arrival.  Pt agreeable to therapy session. Pt is very adamant that no one touch her R shoulder during session. Pt is self-limiting throughout session, reporting that she cannot attempt tasks without her  holding onto her, despite both PT and OT providing assist.  Pt also reporting that she is \"unable to walk\" at this time despite walking to bathroom with PT/OT. Pain Screening  Patient Currently in Pain:  (unrated R shoulder pain)  Vital Signs  Patient Currently in Pain:  (unrated R shoulder pain)       Orientation     Cognition      Objective   Bed mobility  Supine to Sit: Minimal assistance (HOB elevated, assist for trunk placement, increased time)  Sit to Supine: Stand by assistance (HOB elevated, increased time to perform as pt states she needs physical assist for RLE placement despite moving RLE on her own)  Scooting: Dependent/Total (MaxAx2 to scoot up in bed in supine for repositioning)  Transfers  Sit to Stand: Minimal Assistance (from EOB, toilet, and recliner to no AD, heavy encouragement to attempt as pt states she cannot perform without both PT/OT and her  holding onto her gait belt, B knee blocking for comfort, performed with Magy despite stating she needs inc assist)  Stand to sit: Minimal Assistance (pt states she is unable to perform without both PT/OT and  holding on to gait belt, despite this, pt able to perform with Magy)  Stand Pivot Transfers: Minimal Assistance (recliner to EOB, pt again stating she is unable to perform without PT/OT and  holding onto gait belt, however she is able to perform with Magy)  Comment: Pt initially stating that she is only able to perform stand pivot to/from bedside commode.   PT/OT educating pt that if she would like to d/c home, she needs to demonstrate ability to ambulate to bathroom. Pt agreeable to attempt with encouragement. Ambulation  Ambulation?: Yes  Ambulation 1  Surface: level tile  Device: Hand-Held Assist (GUILLERMO, pt very adamant that no one hold onto her anywhere near her RUE)  Other Apparatus:  (sling for RUE)  Assistance: Minimal assistance  Quality of Gait: decreased magdiel, wide JENNIFER, decreased B step height/length, no overt LOB  Distance: 10' + 10' (in and out of bathroom)  Comments: Pt declined further ambulation this session despite encouragement. Pt's  taking several pictures of proper gait belt application. Stairs/Curb  Stairs?: No (pt declining further attempts of OOB activity after returning to recliner from bathroom)     Balance  Comments: Magy for static and dynamic stand for pants management with HHA            Comment: PT/OT assisting with proper application of RUE sling. Pt's  taking pictures of proper fit. Significant time spent during session educating pt and  on pt's current functional status. Pt stating multiple times throughout session that she is unable to perform most tasks without her  holding on to her gait belt, pulling up her underwear, putting on her shoes, etc. despite PT/OT actively providing her the assistance. Pt and  also educated on difference between [address] and OPPT based on pt's needs and functional status. Pt's  stating that pt is able to live on first floor of home, but it might be better for her to go upstairs to their bedroom. PT educating pt and  on energy conservation and reminded them that pt currently states she is unable to attempt stairs at this time. It may be safer for pt to stay on first floor to avoid flight of stairs until she regains strength or has therapy to help her. Pt asking PT/OT about correct placement of purewick.   PT/OT educating pt that due to her wanting to don her underwear from home and due to pt being able to ambulate to bathroom, she should be asking staff to take her into the bathroom vs using the purewick. Pt will also not be able to use a purewick at home, so she should attempt to simulate her home situation. Pt and  reporting that they have been placing and removing the purewick when pt has been needing to urinate throughout the day and night. Pt again asking for demonstration of proper purewick placement. PT/OT educating pt that only staff should be placing purewick, and the same [address] should not be taken out and placed again, as this can lead to bacteria build up and possible UTI. Despite education, pt continues to state that she will be placing and removing purewick throughout the day. RN notified that pt has been educated on risk. G-Code     OutComes Score                                                     AM-PAC Score  AM-PAC Inpatient Mobility Raw Score : 18 (02/28/22 1335)  AM-PAC Inpatient T-Scale Score : 43.63 (02/28/22 1335)  Mobility Inpatient CMS 0-100% Score: 46.58 (02/28/22 1335)  Mobility Inpatient CMS G-Code Modifier : CK (02/28/22 1335)          Goals  Short term goals  Time Frame for Short term goals: discharge  Short term goal 1: patient will perform bed mobility with supervision -ongoing  Short term goal 2: patient will perform transfers sit<>stand with supervision -ongoing  Short term goal 3: patient will ambulate 100' without an assistive device with supervision -ongoing  Short term goal 4: patient will ascend/descend 12 steps with unilateral handrail/HHA with supervision -ongoing  Patient Goals   Patient goals : to go home, be independent    Plan    Plan  Times per week: 2-5  Current Treatment Recommendations: Strengthening,Balance Training,Endurance Training,Patient/Caregiver Education & Training,Functional Mobility Training,Transfer Training,Gait Training,Stair training,Safety Education & Training  Safety Devices  Type of devices:  All fall risk precautions in

## 2022-02-28 NOTE — PROGRESS NOTES
Pt stated she will going to update her  about her care when he gets here at [address] [place] [address].

## 2022-02-28 NOTE — PLAN OF CARE
Problem: Falls - Risk of:  Goal: Will remain free from falls  Description: Will remain free from falls  2/28/2022 0903 by Carri Clark RN  Outcome: Ongoing  Note: Standard fall and seizure precautions are in place. Call light and frequently used items are within pt's reach. Frequent checks are made. Bed alarm is on. Problem: Pain:  Goal: Pain level will decrease  Description: Pain level will decrease  2/28/2022 0903 by Carri Clark RN  Outcome: Ongoing  Note: Assess level of comfort with interactions. Administer pain medication as prescribed. Problem: Fluid Volume - Deficit:  Goal: Absence of fluid volume deficit signs and symptoms  Description: Absence of fluid volume deficit signs and symptoms  Outcome: Ongoing  Note: Monitor I & O's and assess skin turgor. Problem: Nutrition Deficit:  Goal: Ability to achieve adequate nutritional intake will improve  Description: Ability to achieve adequate nutritional intake will improve  Outcome: Ongoing  Note: Monitor I & O's. Encourage adequate intake of balanced diet.

## 2022-02-28 NOTE — PROGRESS NOTES
Assisted patient to ambulate to the BR with assistance of gait belt. Patient was whiney and fretful however gait was slow and steady. Returned to bed with bed alarm on.

## 2022-02-28 NOTE — PROGRESS NOTES
Occupational Therapy  Facility/Department: [place] [facility] [address] [facility]  Daily Treatment Note  NAME: Ester Boateng  : 1960  MRN: [id number]    Date of Service: 2022    Discharge Recommendations:  Ester Boateng scored a 18/24 on the AM-PAC ADL Inpatient form. Current research shows that an AM-PAC score of 18 or greater is typically associated with a discharge to the patient's home setting. Based on the patient's AM-PAC score, and their current ADL deficits, it is recommended that the patient have 2-3 sessions per week of Occupational Therapy at d/c to increase the patient's independence. At this time, this patient demonstrates the endurance and safety to discharge home with [address] [place] [address] and a follow up treatment frequency of 2-3x/wk. Please see assessment section for further patient specific details. If patient discharges prior to next session this note will serve as a discharge summary. Please see below for the latest assessment towards goals. OT Equipment Recommendations  Equipment Needed: No  Other: Possible shower chair vs TTB    Assessment   Performance deficits / Impairments: Decreased functional mobility ; Decreased ADL status; Decreased strength;Decreased safe awareness;Decreased cognition;Decreased endurance;Decreased balance;Decreased high-level IADLs;Decreased fine motor control;Decreased coordination;Decreased posture    Assessment: Pt demo great improvement this session, completing fx mobility w/ Min Ax1 w/o AD. Pt also completed transfers w/ Min A and bed mobility w/ Min A-SBA. Pt is limited by RUE pain and increased anxiety and self-limiting behaviors w/ OOB activity. Pt would benefit from home w/ 24hr A and HHOT upon d/c. Will cont to follow acute OT needs.     Treatment Diagnosis: decreased ability to perform ADL 2/2 EtOH withdrawal and R humerus fx  OT Education: OT Role;Plan of Care;ADL Adaptive Strategies;Transfer Training;Family Education  Patient Education: pt verbalized understanding- reinforce prn  REQUIRES OT FOLLOW UP: Yes  Activity Tolerance  Activity Tolerance: Patient Tolerated treatment well;Treatment limited secondary to decreased cognition;Patient limited by pain  Activity Tolerance: Pt reports 10/10 RUE pain (RN aware). Session limited this date 2/2 pt's apprehension and increased anxiety w/ fx mobility  Safety Devices  Safety Devices in place: Yes  Type of devices: Call light within reach;Nurse notified; Bed alarm in place;Gait belt;Left in bed         Patient Diagnosis(es): The encounter diagnosis was Alcohol withdrawal syndrome with perceptual disturbance ([address] has a past medical history of C. difficile diarrhea, CAD (coronary artery disease), Cervical dystonia, CHF (congestive heart failure) ([address] Dental crown present, Fatty liver, Hyperlipidemia, Hypertension, Lichen sclerosus, and PONV (postoperative nausea and vomiting). has a past surgical history that includes Breast surgery; Endoscopy, colon, diagnostic; Colonoscopy; Endometrial ablation; Colonoscopy (N/A, 7/2/2019); and Colonoscopy (7/2/2019). Restrictions  Position Activity Restriction  Other position/activity restrictions: Up with assist, Sz precautions; RUE in sling     Subjective   General  Chart Reviewed: Yes  Patient assessed for rehabilitation services?: Yes  Family / Caregiver Present: Yes  Referring Practitioner: Hansa Rolle DO  Diagnosis: Fall with dx of R sided proximal humeral neck fracture at OSH, presents with shaking, weakness 2/2 ETOH withdrawal  Subjective  Subjective: Pt supine in bed upon arrival and agreeable to therapy treatment         Orientation     Objective    ADL  LE Dressing: Setup;Minimal assistance (pt donned underwear w/ Min A to thread RLE)  Toileting: Minimal assistance (pt continent of urine on std toilet.  Pt required Min A for pant mgmt at hips in stance)  Additional Comments: Pt noted to have increased anxiety and self limiting behaviors and required encouragement to engage in therapy session. Pt often initially stating \"I can't\" or asking her  to complete the task before attempting for herself. Balance  Sitting Balance: Supervision (static at EOB and on std toilet)  Standing Balance: Minimal assistance  Standing Balance  Time: ~5min total  Activity: functional mobility to/from bathroom; stance for transfers; stance for ADL tasks  Functional Mobility  Functional - Mobility Device: No device (HHA w/ LUE 2/2 pt's increased anxiety)  Activity: To/from bathroom  Assist Level: Minimal assistance  Toilet Transfers  Toilet - Technique: Ambulating  Equipment Used: Standard toilet  Toilet Transfer: Minimal assistance  Toilet Transfers Comments: assist required to control descent and initial sit>stand off commode as pt is used to comfort height toilet in home enviro     Bed mobility  Supine to Sit: Minimal assistance (HOB elevated, assist for trunk placement, increased time)  Sit to Supine: Stand by assistance (HOB elevated, increased time to perform as pt states she needs physical assist for RLE placement despite moving RLE on her own)  Scootin Person assistance;Maximal assistance (MaxAx2 to scoot up in bed in supine for repositioning)     Transfers  Sit to stand: Minimal assistance  Stand to sit: Minimal assistance                          Cognition  Arousal/Alertness: Appropriate responses to stimuli  Memory: Appears intact  Safety Judgement: Good awareness of safety precautions  Initiation: Does not require cues  Sequencing: Does not require cues  Cognition Comment: Pt w/ increased anxiety and self-limiting behaviors.                                          Plan   Plan  Times per week: 2-5  Current Treatment Recommendations: Strengthening,ROM,Balance Training,Functional Mobility Training,Endurance Training,Pain Management,Safety Education & Training,Patient/Caregiver Education & Training,Self-Care / ADL,Positioning  G-Code     OutComes Score AM-PAC Score        AM-PAC Inpatient Daily Activity Raw Score: 18 (02/28/22 1426)  AM-PAC Inpatient ADL T-Scale Score : 38.66 (02/28/22 1426)  ADL Inpatient CMS 0-100% Score: 46.65 (02/28/22 1426)  ADL Inpatient CMS G-Code Modifier : CK (02/28/22 1426)    Goals  Short term goals  Time Frame for Short term goals: BSC t/f with mod A- goal met on std toilet w/ Min A 2/28, Updated: Pt will complete toilet transfer w/ SBA  Short term goal 1: Toileting with mod A- goal met 2/28, Updated: Pt will complete toileting w/ SBA  Short term goal 2: 1 grooming task EOB with SBA - ongoing  Short term goal 3: Don/doff pants/underwear with min A x2- goal met for underwear 2/28, updated: Pt will complete LE dressing w/ SBA  Patient Goals   Patient goals : \"Not drink. \" \"Be independent again. \"       Therapy Time   Individual Concurrent Group Co-treatment   Time In 0876         Time Out 1306         Minutes 59                 Timed Code Treatment Minutes:  59    Total Treatment Minutes:  Yarelis Chan 1420, OT

## 2022-03-01 VITALS
DIASTOLIC BLOOD PRESSURE: 75 MMHG | TEMPERATURE: 98 F | HEIGHT: 70 IN | BODY MASS INDEX: 33.71 KG/M2 | SYSTOLIC BLOOD PRESSURE: 135 MMHG | HEART RATE: 71 BPM | OXYGEN SATURATION: 93 % | RESPIRATION RATE: 20 BRPM | WEIGHT: 235.45 LBS

## 2022-03-01 LAB
A/G RATIO: 1.3 (ref 1.1–2.2)
ALBUMIN SERPL-MCNC: 3.7 G/DL (ref 3.4–5)
ALP BLD-CCNC: 78 U/L (ref 40–129)
ALT SERPL-CCNC: 26 U/L (ref 10–40)
ANION GAP SERPL CALCULATED.3IONS-SCNC: 13 MMOL/L (ref 3–16)
AST SERPL-CCNC: 48 U/L (ref 15–37)
BILIRUB SERPL-MCNC: 0.7 MG/DL (ref 0–1)
BUN BLDV-MCNC: 10 MG/DL (ref 7–20)
CALCIUM SERPL-MCNC: 9.8 MG/DL (ref 8.3–10.6)
CHLORIDE BLD-SCNC: 94 MMOL/L (ref 99–110)
CO2: 25 MMOL/L (ref 21–32)
CREAT SERPL-MCNC: <0.5 MG/DL (ref 0.6–1.2)
GFR AFRICAN AMERICAN: >60
GFR NON-AFRICAN AMERICAN: >60
GLUCOSE BLD-MCNC: 105 MG/DL (ref 70–99)
HCT VFR BLD CALC: 27.5 % (ref 36–48)
HEMOGLOBIN: 9 G/DL (ref 12–16)
MCH RBC QN AUTO: 27.6 PG (ref 26–34)
MCHC RBC AUTO-ENTMCNC: 32.6 G/DL (ref 31–36)
MCV RBC AUTO: 84.7 FL (ref 80–100)
PDW BLD-RTO: 19.2 % (ref 12.4–15.4)
PLATELET # BLD: 153 K/UL (ref 135–450)
PMV BLD AUTO: 8 FL (ref 5–10.5)
POTASSIUM SERPL-SCNC: 4 MMOL/L (ref 3.5–5.1)
RBC # BLD: 3.25 M/UL (ref 4–5.2)
SODIUM BLD-SCNC: 132 MMOL/L (ref 136–145)
TOTAL PROTEIN: 6.6 G/DL (ref 6.4–8.2)
URINE CULTURE, ROUTINE: NORMAL
WBC # BLD: 4.6 K/UL (ref 4–11)

## 2022-03-01 PROCEDURE — 80053 COMPREHEN METABOLIC PANEL: CPT

## 2022-03-01 PROCEDURE — 97530 THERAPEUTIC ACTIVITIES: CPT

## 2022-03-01 PROCEDURE — 6360000002 HC RX W HCPCS: Performed by: INTERNAL MEDICINE

## 2022-03-01 PROCEDURE — 2580000003 HC RX 258: Performed by: INTERNAL MEDICINE

## 2022-03-01 PROCEDURE — 97116 GAIT TRAINING THERAPY: CPT

## 2022-03-01 PROCEDURE — 36415 COLL VENOUS BLD VENIPUNCTURE: CPT

## 2022-03-01 PROCEDURE — 6370000000 HC RX 637 (ALT 250 FOR IP): Performed by: EMERGENCY MEDICINE

## 2022-03-01 PROCEDURE — 6370000000 HC RX 637 (ALT 250 FOR IP): Performed by: INTERNAL MEDICINE

## 2022-03-01 PROCEDURE — 85027 COMPLETE CBC AUTOMATED: CPT

## 2022-03-01 RX ORDER — CHLORDIAZEPOXIDE HYDROCHLORIDE 5 MG/1
5 CAPSULE, GELATIN COATED ORAL 3 TIMES DAILY
Qty: 6 CAPSULE | Refills: 0 | Status: CANCELLED | OUTPATIENT
Start: 2022-03-01 | End: 2022-03-03

## 2022-03-01 RX ORDER — POLYETHYLENE GLYCOL 3350 17 G/17G
17 POWDER, FOR SOLUTION ORAL DAILY
Qty: 527 G | Refills: 1 | COMMUNITY
Start: 2022-03-02 | End: 2022-04-01

## 2022-03-01 RX ORDER — SENNA AND DOCUSATE SODIUM 50; 8.6 MG/1; MG/1
2 TABLET, FILM COATED ORAL 2 TIMES DAILY
COMMUNITY
Start: 2022-03-01

## 2022-03-01 RX ORDER — BISACODYL 10 MG
10 SUPPOSITORY, RECTAL RECTAL DAILY PRN
COMMUNITY
Start: 2022-03-01 | End: 2022-03-31

## 2022-03-01 RX ORDER — OXYCODONE HYDROCHLORIDE 5 MG/1
5 TABLET ORAL EVERY 6 HOURS PRN
Qty: 28 TABLET | Refills: 0 | Status: SHIPPED | OUTPATIENT
Start: 2022-03-01 | End: 2022-03-08

## 2022-03-01 RX ORDER — POLYETHYLENE GLYCOL 3350 17 G/17G
17 POWDER, FOR SOLUTION ORAL DAILY
Status: DISCONTINUED | OUTPATIENT
Start: 2022-03-01 | End: 2022-03-01 | Stop reason: HOSPADM

## 2022-03-01 RX ORDER — OXYCODONE HYDROCHLORIDE 5 MG/1
5 TABLET ORAL EVERY 4 HOURS PRN
Status: DISCONTINUED | OUTPATIENT
Start: 2022-03-01 | End: 2022-03-01 | Stop reason: HOSPADM

## 2022-03-01 RX ORDER — LIDOCAINE 4 G/G
1 PATCH TOPICAL DAILY
COMMUNITY
Start: 2022-03-02

## 2022-03-01 RX ORDER — KETOROLAC TROMETHAMINE 15 MG/ML
15 INJECTION, SOLUTION INTRAMUSCULAR; INTRAVENOUS ONCE
Status: COMPLETED | OUTPATIENT
Start: 2022-03-01 | End: 2022-03-01

## 2022-03-01 RX ORDER — LORAZEPAM 1 MG/1
1 TABLET ORAL DAILY PRN
Status: DISCONTINUED | OUTPATIENT
Start: 2022-03-01 | End: 2022-03-01 | Stop reason: HOSPADM

## 2022-03-01 RX ORDER — CHLORDIAZEPOXIDE HYDROCHLORIDE 5 MG/1
5 CAPSULE, GELATIN COATED ORAL 3 TIMES DAILY
Status: DISCONTINUED | OUTPATIENT
Start: 2022-03-01 | End: 2022-03-01 | Stop reason: HOSPADM

## 2022-03-01 RX ORDER — ASPIRIN 81 MG/1
81 TABLET ORAL DAILY
Qty: 30 TABLET | Refills: 0 | COMMUNITY
Start: 2022-03-01

## 2022-03-01 RX ORDER — MECOBALAMIN 5000 MCG
5 TABLET,DISINTEGRATING ORAL NIGHTLY
COMMUNITY
Start: 2022-03-01

## 2022-03-01 RX ADMIN — POLYETHYLENE GLYCOL 3350 17 G: 17 POWDER, FOR SOLUTION ORAL at 13:33

## 2022-03-01 RX ADMIN — KETOROLAC TROMETHAMINE 15 MG: 15 INJECTION, SOLUTION INTRAMUSCULAR; INTRAVENOUS at 15:28

## 2022-03-01 RX ADMIN — PROMETHAZINE HYDROCHLORIDE 12.5 MG: 25 TABLET ORAL at 06:01

## 2022-03-01 RX ADMIN — OXYCODONE 5 MG: 5 TABLET ORAL at 08:41

## 2022-03-01 RX ADMIN — OXYCODONE 5 MG: 5 TABLET ORAL at 04:19

## 2022-03-01 RX ADMIN — ENOXAPARIN SODIUM 40 MG: 100 INJECTION SUBCUTANEOUS at 08:36

## 2022-03-01 RX ADMIN — LORAZEPAM 1 MG: 1 TABLET ORAL at 15:28

## 2022-03-01 RX ADMIN — LORAZEPAM 1 MG: 1 TABLET ORAL at 04:19

## 2022-03-01 RX ADMIN — CHLORDIAZEPOXIDE HYDROCHLORIDE 10 MG: 5 CAPSULE ORAL at 08:35

## 2022-03-01 RX ADMIN — PANTOPRAZOLE SODIUM 40 MG: 40 TABLET, DELAYED RELEASE ORAL at 06:01

## 2022-03-01 RX ADMIN — SODIUM CHLORIDE, PRESERVATIVE FREE 10 ML: 5 INJECTION INTRAVENOUS at 15:28

## 2022-03-01 RX ADMIN — SENNOSIDES AND DOCUSATE SODIUM 2 TABLET: 50; 8.6 TABLET ORAL at 08:36

## 2022-03-01 RX ADMIN — OXYCODONE 5 MG: 5 TABLET ORAL at 13:34

## 2022-03-01 RX ADMIN — CHLORDIAZEPOXIDE HYDROCHLORIDE 10 MG: 5 CAPSULE ORAL at 13:33

## 2022-03-01 RX ADMIN — THIAMINE HCL TAB 100 MG 100 MG: 100 TAB at 08:36

## 2022-03-01 RX ADMIN — VALSARTAN 160 MG: 160 TABLET, FILM COATED ORAL at 08:35

## 2022-03-01 RX ADMIN — MULTIVITAMIN TABLET 1 TABLET: TABLET at 08:36

## 2022-03-01 RX ADMIN — SODIUM CHLORIDE, PRESERVATIVE FREE 10 ML: 5 INJECTION INTRAVENOUS at 08:35

## 2022-03-01 ASSESSMENT — PAIN SCALES - GENERAL
PAINLEVEL_OUTOF10: 10
PAINLEVEL_OUTOF10: 7
PAINLEVEL_OUTOF10: 8

## 2022-03-01 ASSESSMENT — PAIN DESCRIPTION - FREQUENCY
FREQUENCY: CONTINUOUS

## 2022-03-01 ASSESSMENT — PAIN - FUNCTIONAL ASSESSMENT
PAIN_FUNCTIONAL_ASSESSMENT: PREVENTS OR INTERFERES SOME ACTIVE ACTIVITIES AND ADLS

## 2022-03-01 ASSESSMENT — PAIN DESCRIPTION - ORIENTATION
ORIENTATION: RIGHT

## 2022-03-01 ASSESSMENT — PAIN DESCRIPTION - DESCRIPTORS
DESCRIPTORS: ACHING;DISCOMFORT

## 2022-03-01 ASSESSMENT — PAIN DESCRIPTION - PROGRESSION
CLINICAL_PROGRESSION: GRADUALLY WORSENING
CLINICAL_PROGRESSION: GRADUALLY IMPROVING

## 2022-03-01 ASSESSMENT — PAIN DESCRIPTION - PAIN TYPE
TYPE: ACUTE PAIN
TYPE: ACUTE PAIN

## 2022-03-01 ASSESSMENT — PAIN DESCRIPTION - ONSET
ONSET: ON-GOING

## 2022-03-01 ASSESSMENT — PAIN DESCRIPTION - LOCATION
LOCATION: ARM
LOCATION: ARM;BACK;NECK
LOCATION: ARM;SHOULDER

## 2022-03-01 NOTE — PROGRESS NOTES
Occupational Therapy  Facility/Department: [facility] [address] [facility]  Daily Treatment Note  NAME: Skylar Verduzco  : 1960  MRN: [id number]    Date of Service: 3/1/2022    Discharge Recommendations:  Skylar Verduzco scored a 18/24 on the AM-PAC ADL Inpatient form. Current research shows that an AM-PAC score of 18 or greater is typically associated with a discharge to the patient's home setting. Based on the patient's AM-PAC score, and their current ADL deficits, it is recommended that the patient have 2-3 sessions per week of Occupational Therapy at d/c to increase the patient's independence. At this time, this patient demonstrates the endurance and safety to discharge home with [address] [place] [address] and a follow up treatment frequency of 2-3x/wk. Please see assessment section for further patient specific details. If patient discharges prior to next session this note will serve as a discharge summary. Please see below for the latest assessment towards goals. OT Equipment Recommendations  Other: Possible shower chair vs TTB    Assessment   Performance deficits / Impairments: Decreased functional mobility ; Decreased ADL status; Decreased strength;Decreased safe awareness;Decreased cognition;Decreased endurance;Decreased balance;Decreased high-level IADLs;Decreased fine motor control;Decreased coordination;Decreased posture    Assessment: Pt demo great improvement this session, completing fx mobility w/ CGA and w/o AD. Pt also completed transfers w/ CGA and bed mobility w/ Spvn. Pt is limited by RUE pain and increased anxiety and self-limiting behaviors w/ OOB activity. Pt would benefit from home w/ 24hr A and HHOT upon d/c. Pt's  is retired and reports he is able to provide 24hr A for pt upon dc. Will cont to follow acute OT needs.     Treatment Diagnosis: decreased ability to perform ADL 2/2 EtOH withdrawal and R humerus fx  OT Education: OT Role;Plan of Care;ADL Adaptive Strategies;Transfer Training;Family Education  Patient Education: pt verbalized understanding- reinforce prn  Activity Tolerance  Activity Tolerance: Patient Tolerated treatment well;Patient limited by pain  Activity Tolerance: Session limited this date 2/2 pt's apprehension and increased anxiety w/ fx mobility  Safety Devices  Safety Devices in place: Yes  Type of devices: Call light within reach;Nurse notified; Bed alarm in place; Left in bed         Patient Diagnosis(es): The primary encounter diagnosis was Alcohol withdrawal syndrome with perceptual disturbance ([facility] A diagnosis of Other closed nondisplaced fracture of proximal end of right humerus with routine healing, subsequent encounter was also pertinent to this visit. has a past medical history of C. difficile diarrhea, CAD (coronary artery disease), Cervical dystonia, CHF (congestive heart failure) ([facility] [address] Dental crown present, Fatty liver, Hyperlipidemia, Hypertension, Lichen sclerosus, and PONV (postoperative nausea and vomiting). has a past surgical history that includes Breast surgery; Endoscopy, colon, diagnostic; Colonoscopy; Endometrial ablation; Colonoscopy (N/A, 7/2/2019); and Colonoscopy (7/2/2019). Restrictions  Position Activity Restriction  Other position/activity restrictions:  Up with assist, Sz precautions; RUE in sling     Subjective   General  Chart Reviewed: Yes  Patient assessed for rehabilitation services?: Yes  Family / Caregiver Present: Yes  Referring Practitioner: Christie Walker DO  Diagnosis: Fall with dx of R sided proximal humeral neck fracture at OSH, presents with shaking, weakness 2/2 ETOH withdrawal  Subjective  Subjective: Pt sitting EOB w/ PT present upon arrival and agreeable to therapy treatment        Orientation     Objective             Balance  Sitting Balance: Supervision (seated unsupported EOB)  Standing Balance: Contact guard assistance  Standing Balance  Time: ~15min total  Activity: functional mobility in hallway (~300ft); functional mobility in room; stance for transfers  Functional Mobility  Functional - Mobility Device: No device (HHA w/ LUE 2/2 pt's increased anxiety)  Activity: To/from bathroom; Other  Assist Level: Contact guard assistance (CGA w/ 2 person assist per pt request)     Bed mobility  Sit to Supine: Supervision (head of bed elevated)  Scooting: Supervision (to EOB)     Transfers  Sit to stand: Contact guard assistance  Stand to sit: Contact guard assistance  Transfer Comments: pt requesting 2 person assist due to increased anxiety w/ OOB activity                          Cognition  Overall Cognitive Status: [facility]  Cognition Comment: Pt w/ increased anxiety and self-limiting behaviors. Plan   Plan  Times per week: 2-5  Current Treatment Recommendations: Strengthening,ROM,Balance Training,Functional Mobility Training,Endurance Training,Pain Management,Safety Education & Training,Patient/Caregiver Education & Training,Self-Care / ADL,Positioning  G-Code     OutComes Score                                                  AM-PAC Score        AM-PAC Inpatient Daily Activity Raw Score: 18 (02/28/22 1426)  AM-PAC Inpatient ADL T-Scale Score : 38.66 (02/28/22 1426)  ADL Inpatient CMS 0-100% Score: 46.65 (02/28/22 1426)  ADL Inpatient CMS G-Code Modifier : CK (02/28/22 1426)    Goals  Short term goals  Time Frame for Short term goals: BSC t/f with mod A- goal met on std toilet w/ Min A 2/28, Updated: Pt will complete toilet transfer w/ SBA  Short term goal 1: Toileting with mod A- goal met 2/28, Updated: Pt will complete toileting w/ SBA  Short term goal 2: 1 grooming task EOB with SBA - ongoing  Short term goal 3: Don/doff pants/underwear with min A x2- goal met for underwear 2/28, updated: Pt will complete LE dressing w/ SBA  Patient Goals   Patient goals : \"Not drink. \" \"Be independent again. \"       Therapy Time   Individual Concurrent Group Co-treatment   Time In 1450         Time Out 1520 Minutes 30                   Timed Code Treatment Minutes:  30    Total Treatment Minutes:  Sang 65, OT

## 2022-03-01 NOTE — PROGRESS NOTES
Discharge note: Patient has been seen by doctor. Discharge order obtained, and discharge instructions reviewed. Patient educated, using the teach back method, about follow up instructions and discharge instructions. A completed copy of the AVS instructions given to patient and all questions answered. IV catheter removed without complaints, catheter intact, site WNL. Discharged to [facility] via wheel chair per transportation.     Electronically signed by Rhianna Terry RN on 3/1/2022 at 5:36 PM

## 2022-03-01 NOTE — CARE COORDINATION
CTN contacted Sandi with [facility] [phone]. They have accepted this patient and will pull referral from [facility].  They will contact patient and make arrangements for [facility] by 3/3  Electronically signed by Waddell Sandifer, LPN on 1/4/4547 at 5:51 PM

## 2022-03-01 NOTE — PROGRESS NOTES
Physical Therapy  Facility/Department: [facility] [address] [facility]  Daily Treatment Note  NAME: Trice Madrid  : 1960  MRN: [id number]    Date of Service: 3/1/2022    Discharge Recommendations:    Trice Madrid scored a 18/24 on the AM-PAC short mobility form. Current research shows that an AM-PAC score of 18 or greater is typically associated with a discharge to the patient's home setting. Based on the patient's AM-PAC score and their current functional mobility deficits, it is recommended that the patient have 2-3 sessions per week of Physical Therapy at d/c to increase the patient's independence. At this time, this patient demonstrates the endurance and safety to discharge home with home PT and a follow up treatment frequency of 2-3x/wk. Please see assessment section for further patient specific details. HOME HEALTH CARE: LEVEL 1 STANDARD    - Initial home health evaluation to occur within 24-48 hours, in patient home   - Therapy to evaluate with goal of regaining prior level of functioning   - Therapy to evaluate if patient has [address] needs for personal care      PT Equipment Recommendations  Equipment Needed: No    Assessment   Body structures, Functions, Activity limitations: Decreased functional mobility ; Increased pain;Decreased balance;Decreased strength;Decreased safe awareness;Decreased endurance  Assessment: Patient able to significantly increase ambulation distance during today's session, ambulating with CGA x 2 per patient request.  Patient reporting she lost her balance multiple times throughout ambulation but not noted by PT or OT and reassurance provided to patient regarding currently mobility status. Patient's  assisted to don boots reporting he plans to do this upon discharge. Patient reporting weakness in R leg though not noted with functional mobility. She plans to d/c home with 24 hour supervision/assistance from . Recommend continued skilled PT upon discharge. Will follow while in acute care setting to maximize independence with mobility. Patient declining stair training at this time reporting plans to stay on main level and perform sponge baths until she feels confident in her ability to ascend/descend stairs. Treatment Diagnosis: impaired mobility associated with alcohol dependence with withdrawal  Prognosis: Good  PT Education: Goals; General Safety;Gait Training;PT Role;Plan of Care; Functional Mobility Training;Transfer Training  Patient Education: current level of mobility; patient would benefit from reinforcement  REQUIRES PT FOLLOW UP: Yes  Activity Tolerance  Activity Tolerance: Patient Tolerated treatment well;Patient limited by pain; Other  Activity Tolerance: patient continues to be highly anxious with mobility, self limiting with continous verbal cues for encouragement     Patient Diagnosis(es): The primary encounter diagnosis was Alcohol withdrawal syndrome with perceptual disturbance ([address] A diagnosis of Other closed nondisplaced fracture of proximal end of right humerus with routine healing, subsequent encounter was also pertinent to this visit. has a past medical history of C. difficile diarrhea, CAD (coronary artery disease), Cervical dystonia, CHF (congestive heart failure) ([address] Dental crown present, Fatty liver, Hyperlipidemia, Hypertension, Lichen sclerosus, and PONV (postoperative nausea and vomiting). has a past surgical history that includes Breast surgery; Endoscopy, colon, diagnostic; Colonoscopy; Endometrial ablation; Colonoscopy (N/A, 7/2/2019); and Colonoscopy (7/2/2019). Restrictions  Position Activity Restriction  Other position/activity restrictions: Up with assist, Sz precautions; RUE in sling  Subjective   General  Chart Reviewed: Yes  Additional Pertinent Hx: Patient is a 61 y/o female admitted 2/24 with alcohol withdrawal and s/p fall.   Patient went to [facility] after fall where x-rays were positive for right humerus fracture. Patient placed in sling and discharged to follow up with ortho as outpatient. PMH significant for CAD, CHF, HLD, HTN, cervical dystonia. Family / Caregiver Present: Yes ()  Referring Practitioner: Byron Garcia DO  Subjective  Subjective: \"I can't go home today with the amount of pain I'm having. \"  General Comment  Comments: Patient supine in bed upon arrival, agreeable to PT treatment with maximal encouragement. Patient continues to be anxious and self limiting, requiring continuous verbal cues for encouragement and motivation throughout session. Pain Screening  Patient Currently in Pain: Yes (pain in right shoulder, not rated, RN aware)  Vital Signs  Patient Currently in Pain: Yes (pain in right shoulder, not rated, RN aware)       Orientation  Orientation  Overall Orientation Status: Within Functional Limits  Cognition   Cognition  Overall Cognitive Status: WFL  Cognition Comment: Pt w/ increased anxiety and self-limiting behaviors.   Objective   Bed mobility  Supine to Sit: Supervision (head of bed elevated)  Sit to Supine: Supervision (head of bed elevated)  Scooting: Supervision (to EOB)  Transfers  Sit to Stand: Contact guard assistance (CGA x 2 per patient request)  Stand to sit: Contact guard assistance (CGA x 2 per patient request)  Ambulation  Ambulation?: Yes  Ambulation 1  Surface: level tile  Device: Hand-Held Assist (HHA on L)  Assistance: Contact guard assistance (CGA x 2 per patient request)  Quality of Gait: decreased step length/height bilaterally with decreased magdiel, wide base of support, gait overall steady with no loss of balance noted  Distance: 300' in room and hallways returning to EOB sitting  Stairs/Curb  Stairs?: No (patient declining attempts for stair training at this time)     Balance  Sitting - Static: Fair;+ (supervision)  Standing - Static: Fair (CGA)  Standing - Dynamic: Fair (CGA to ambulate without an assistive device)                        OutComes Score                                                     AM-PAC Score  AM-PAC Inpatient Mobility Raw Score : 18 (03/01/22 1532)  AM-PAC Inpatient T-Scale Score : 43.63 (03/01/22 1532)  Mobility Inpatient CMS 0-100% Score: 46.58 (03/01/22 1532)  Mobility Inpatient CMS G-Code Modifier : CK (03/01/22 1532)          Goals  Short term goals  Time Frame for Short term goals: discharge  Short term goal 1: patient will perform bed mobility with supervision -goal met 3/1  Short term goal 2: patient will perform transfers sit<>stand with supervision -ongoing  Short term goal 3: patient will ambulate 100' without an assistive device with supervision -ongoing  Short term goal 4: patient will ascend/descend 12 steps with unilateral handrail/HHA with supervision -ongoing  Patient Goals   Patient goals : to go home, be independent    Plan    Plan  Times per week: 2-5  Current Treatment Recommendations: Strengthening,Balance Training,Endurance Training,Patient/Caregiver Education & Training,Functional Mobility Training,Transfer Training,Gait Training,Stair training,Safety Education & Training  Safety Devices  Type of devices: Bed alarm in place,Call light within reach,Gait belt,Left in bed,Nurse notified     Therapy Time   Individual Concurrent Group Co-treatment   Time In 1439         Time Out 1520         Minutes 41         Timed Code Treatment Minutes: 41 Minutes     If patient discharges prior to next session this note will serve as a discharge summary. Please see below for the latest assessment towards goals.    [address]

## 2022-03-01 NOTE — PROGRESS NOTES
Hospital Medicine Progress Note     PCP: Jigna Harry    Date of Admission: 2/24/2022    Chief Complaint:  Alcoholism, shaking    Interval HPI  Has not had BM but does not feel uncomfortable, feels she will have BM soon. Right arm very stiff and painful and she is concerned about not being able to use it. Is also worried about her walking, says she has not ambulated much yet and doesn't know if she can ambulate at home. Her R leg feels weaker than left however she feels the weakness is due to R-sided back pain because she cam move her legs normally. Also +urinary frequency. History Of Present Illness:      60 y.o. female  with CAD, hx of CVA, hypertension, and hyperlipidemia who presented to the ED due to shaking. Patient was recently admitted at [facility] where she was diagnosed with right arm fracture. The patient is wearing a sling. Patient reported that she had few drinks with her friends for the Super Bowl and then since I noticed that she is back on binge drinking daily at least 2 shots of bourbon every 3 hours. Patient reported that her last drink was around 3 PM day of presentation. Patient reported that she also had a fall day of admission,  where she tripped over a cardboard box lost balance. She was taken by ambulance to [facility] where she was diagnosed with a right sided proximal humeral neck fracture. At the time the patient was imaged as well as put in a sling, given pain medication and a referral to orthopedics. Appears she was discharged Home. After getting home from the hospital the patient was incredibly shaky and she had another shot of bourbon before presenting to the emergency department here due to feeling nauseous, shaky, \"disoriented\" as well as 10/10 pain, constant, radiating up towards her right shoulder which is sharp in nature.       No known relieving exacerbating factor no strength fever chills nausea vomiting chest pain abdominal pain or dysuria. Patient does report that she is feeling very anxious and has had a gradual in the past but never had a seizure or DTs. Patient does report that she did require 4 mg Ativan every hour for the first 24 hours when she was detoxing. On presentation, she was afebrile. BP was 181/102. She wasmildly tachycardic at 109. Lab-work showed no leucocytosis. Hgb 10.8, Platelet 918. Na was 129, Lactate 2.3. Liver enzymes showed AST elevated at 79, T bili elevated at 1.1; Normal ALT and ALP. Lipase was normal at 49. ETOH: not detected. CXR: Elevated right hemidiaphragm with low right lung volume. EKG showed NSR, Q waves in septal leads. Medications : Reviewed      Allergies:  Atorvastatin, Iodine, Pravastatin, Rosuvastatin, Shellfish allergy, Sulfa antibiotics, Tylenol [acetaminophen], and Lunesta [eszopiclone]        REVIEW OF SYSTEMS: as in HPI  All other systems reviewed and are negative      PHYSICAL EXAM PERFORMED:    /75   Pulse 71   Temp 98 °F (36.7 °C) (Oral)   Resp 20   Ht 5' 10\" (1.778 m)   Wt 235 lb 7.2 oz (106.8 kg)   SpO2 93%   BMI 33.78 kg/m²     General appearance: in no acute distress  HEENT:   atraumatic, sclera anicteric, Conjunctivae clear. Respiratory:minimal accessory muscle usage, Normal respiratory effort. Cardiovascular:  Regular rate and rhythm  Abdomen: Soft, non-tender, non-distended with normal bowel sounds. Musculoskeletal:    Right upper extremity is immobilized in arm sling. Radial pulse is strong. Patient demonstrates movement of R arm, endorses pain with movement. Skin: Bruising to R lower back  Neurologic: Alert and oriented x4, no new focal sensory/motor deficits. B/L LE strength 5/5. Ambulation appears normal walking to the bathroom.      Labs:     Recent Labs     02/27/22  0541 02/28/22  0458 03/01/22  0538   WBC 4.7 4.9 4.6   HGB 8.7* 9.3* 9.0*   HCT 26.3* 28.7* 27.5*   * 136 153     Recent Labs     02/27/22  0541 02/28/22  0456 03/01/22  0538   * 136 132*   K 4.1 3.7 4.0   CL 96* 96* 94*   CO2 24 32 25   BUN 8 7 10   CREATININE <0.5* <0.5* <0.5*   CALCIUM 9.5 9.7 9.8     Recent Labs     02/27/22  0541 02/28/22  0458 03/01/22  0538   AST 75* 52* 48*   ALT 24 26 26   BILITOT 0.7 0.7 0.7   ALKPHOS 66 76 78     No results for input(s): INR in the last 72 hours. No results for input(s): Chelsey Oneida in the last 72 hours. Urinalysis:      Lab Results   Component Value Date    NITRU POSITIVE 02/26/2022    WBCUA 0-2 02/26/2022    BACTERIA Rare 02/26/2022    RBCUA 0-2 02/26/2022    BLOODU Negative 02/26/2022    SPECGRAV 1.025 02/26/2022    GLUCOSEU Negative 02/26/2022         ASSESSMENT AND PLAN:  60 y.o. female  with CAD, hx of CVA, hypertension, and hyperlipidemia who presented to the ED due to shaking. Alcohol withdrawal: POA - resolved  - CIWA protocol initiated  - Chlordiazepoxide scheduled: Pxt requested discontinuation per notes. Will taper the dosage down. Withdrawal appears resolved. - MVI, Thiamine  - Not meeting Ativan criteria, dc CIWA    Fall: POA  - Mechanical fall, prob sec to alcoholism; Cervical dystonia on chronic benzodiazepine  - Vit B12: 336  - Treat possible UTI  -  on ETOH  - PT/OT eval: Home with [facility]     Traumatic Right sided proximal humeral neck fracture: POA  - Diagnosed at [facility] were she had presented after her fall, was discharged Home, and re-presented here with shaking, disorientation and severe right arm pain  -   At [facility], was imaged as well as put in a sling, given pain medication and a referred to o/p orthopedics.   - Ortho consulted, appreciate recs, advised outpatient follow-up  - Continue immobilization on sling  - Continue pain control  - Ortho follow up in 1-2 weeks  - Home PT/OT    Alcohol dependence  Hyponatremia: POA  Elevated liver enzyme: POA  Thrombocytopenia  - Sec to alcoholism  - Monitor labs  - Counseling  - MSW eval    Possible UTI: POA  - Presented with fall  - Had suprapubic pain and tenderness  As well as urinary frequency and urgency  - UA reviewed: bacteria, nitrite positive, though not much WBCs  - Urine culture ordered 2/26. Called Micro lab [facility]) 2/28/22. Specimen still at [facility]. - Empiric antibx for now. Probably switch to empiric PO with logistic issues re ordered urine test  - Completed Rocephin while awaiting Cx - Cx negative    Chronic Normocytic anemia:  - Update work up: Iron panel, Hemoccult ordered  - Updated B12, Folate: dot deficient      Essential Hypertension/ hypertensive urgency: POA  - BP was 181/102 on presentation  - Improved. Continue home medication: ARB  - Monitor and adjust meds as needed        Chronic HFpEF. POA  - Reported intermitted dyspnea, echo pending  - Not on lasix at home  - No echo in years: ordered    CAD: POA -Hx of subclinical coronary artery disease based on cardiac CTA 8/18/2016  Familial hypercholesterolemia intolerant to statin therapy: POA   - EKG: NSR, old anterior infarct  - On just ARB at home  - Not on other cardiac meds  - Appears was on Repatha in the past  - Will [facility]      Anxiety Disorder: POA  Cervical dystonia: POA  - Benzodiazepine dependent: Reported on Ativan 2 mg at Home as well as Ambien  - Discussed with her need to discuss with her regular providers, risk/benefits of chronic benzo use,  taqueria in setting of her alcoholism      HX of CVA:   - Will need optimization of risk factor mgt  - F/u with PCP  - ASA    Constipation  Previously not willing to take bowel regimen but has started taking  Mag citrate or dulcolax suppository if needed    DVT Prophylaxis: lovenox  Disposition:     Patient is afraid to go home since she cannot move her R arm given her fracture and has not yet ambulated very far with PT (previously declined). Will plan for dc today if she is still appearing to ambulate safely.     PT/OT eval : Home with [address] [facility] [address] [facility] 18/24     [facility], DO

## 2022-03-01 NOTE — CARE COORDINATION
Case Management Assessment            Discharge Note                    Date / Time of Note: 3/1/2022 3:50 PM                  Discharge Note Completed by: Preston Lea RN    Patient Name: Anthony Gavin   YOB: 1960  Diagnosis: [facility] [F10.239]  Alcohol withdrawal syndrome with perceptual disturbance [facility]) [F10.232]   Date / Time: 2/24/2022 11:37 PM    Current PCP: Jasmyne Torres patient: No    Hospitalization in the last 30 days: No    Advance Directives:  Code Status: Full Code  [place] DNR form completed and on chart: Not Indicated    Financial:  Payor: Whitney Casey / Plan: [employer]  / Product Type: *No Product type* /      Pharmacy:    [facility]  [phone] - F [phone]  [address] [place], [place] [address] [id number]  Phone: [phone] Fax: [phone]      Assistance purchasing medications?: Potential Assistance Purchasing Medications: No  Assistance provided by Case Management: None at this time    Does patient want to participate in local refill/ meds to beds program?:      Meds To Beds General Rules:  1. Can ONLY be done Monday- Friday between 8:30am-5pm  2. Prescription(s) must be in pharmacy by 3pm to be filled same day  3. Copy of patient's insurance/ prescription drug card and patient face sheet must be sent along with the prescription(s)  4. Cost of Rx cannot be added to hospital bill. If financial assistance is needed, please contact unit  or ;  or  CANNOT provide pharmacy voucher for patients co-pays  5.  Patients can then  the prescription on their way out of the hospital at discharge, or pharmacy can deliver to the bedside if staff is available. (payment due at time of pick-up or delivery - cash, check, or card accepted)     Able to afford home medications/ co-pay costs: [phone]  Fax: [phone]

## 2022-03-01 NOTE — PLAN OF CARE
Problem: Fluid Volume - Deficit:  Goal: Absence of fluid volume deficit signs and symptoms  Description: Absence of fluid volume deficit signs and symptoms  Outcome: Met This Shift  Note: Pt hemodynamic stable throughout shift. Will continue to monitor. Problem: Falls - Risk of:  Goal: Will remain free from falls  Description: Will remain free from falls  Outcome: Ongoing  Note: Pt is a high fall risk. Standard fall precaution in place. Ambulates x1 with gait belt. Pt has been free of falls. Problem: Pain:  Goal: Pain level will decrease  Description: Pain level will decrease  Outcome: Ongoing  Note: Pt has on-going pain rated 10/10,  pain is controled during the shift rated 7/10. Pt is given prn pain meds based on pain rating and implemented non-pharm interventions such as rest and encouraged to relax.      Pt stated will going to update the  for plan of care when he visit at [address] [place] [address].

## 2022-03-07 NOTE — DISCHARGE SUMMARY
Hospital Medicine Discharge Summary    Patient: Anthony Gavin     Gender: female  : 1960   Age: 60 y.o. MRN: [id number]    Admitting Physician: Eloy Burkitt, DO  Discharge Physician: Mahendra Carrasquillo DO    Code Status: Prior     Admit Date: 2022   Discharge Date: 3/1/2022      Disposition:  Home    Discharge Diagnoses: Active Hospital Problems    Diagnosis Date Noted    Alcohol dependence with withdrawal ([facility] [F10.239] 2022       Condition at Discharge: Stable    Hospital Course:   Patient was admitted for evaluation of shaking. Alcohol withdrawal: POA - resolved  - CIWA protocol initiated  - Chlordiazepoxide scheduled: Pxt requested discontinuation per notes. Will taper the dosage down. Withdrawal appears resolved. - MVI, Thiamine  - Not meeting Ativan criteria, dc CIWA     Fall: POA  - Mechanical fall, prob sec to alcoholism; Cervical dystonia on chronic benzodiazepine  - Vit B12: 336  - Treat possible UTI  -  on ETOH  - PT/OT eval: Home with [facility]      Traumatic Right sided proximal humeral neck fracture: POA  - Diagnosed at [facility] were she had presented after her fall, was discharged Home, and re-presented here with shaking, reported disorientation and severe right arm pain  -   At [facility], was imaged as well as put in a sling, given pain medication and a referred to o/p orthopedics.   - Ortho consulted, appreciate recs, advised outpatient follow-up  - Continue immobilization on sling  - Ortho follow up in 1-2 weeks  - Patient did not want to discharge to home at first due to concerns about her ability to ambulate however she worked with PT and scores are consistent with discharge to home.     Alcohol dependence  Hyponatremia: POA  Elevated liver enzyme: POA  Thrombocytopenia  - Sec to alcoholism  - Monitor labs  - Counseling  - MSW eval     Possible UTI: POA  - Presented with fall  - Had suprapubic pain and tenderness  As well as urinary frequency and urgency  - UA reviewed: bacteria, nitrite positive, though not much WBCs  - Urine culture ordered 2/26. Called Micro lab [facility]) 2/28/22. Specimen still at [facility]. - Empiric antibx for now. Probably switch to empiric PO with logistic issues re ordered urine test  - Completed Rocephin while awaiting Cx - Cx negative     Chronic Normocytic anemia:  - Update work up: Iron panel, Hemoccult ordered  - Updated B12, Folate: dot deficient     Essential Hypertension/ hypertensive urgency: POA - resolved  - BP was 181/102 on presentation  - Improved. Continue home medication: ARB  - Monitor and adjust meds as needed      Chronic HFpEF. POA   - Reported intermitted dyspnea, self resolved  - Consider further work-up outpatient     CAD: POA -Hx of subclinical coronary artery disease based on cardiac CTA 8/18/2016  Familial hypercholesterolemia intolerant to statin therapy: POA   - EKG: NSR, old anterior infarct  - On just ARB at home  - Not on other cardiac meds  - Appears was on Repatha in the past  - Will need F/u with Dr. Basia Melchor [facility])     Anxiety Disorder: POA  Cervical dystonia: POA  - Benzodiazepine dependent: Reported on Ativan 2 mg at Home as well as Ambien  - Discussed with her need to discuss with her regular providers, risk/benefits of chronic benzo use,  taqueria in setting of her alcoholism     HX of CVA:   - Will need optimization of risk factor mgt  - F/u with PCP  - ASA     Constipation  Previously not willing to take bowel regimen but has started taking  Mag citrate or dulcolax suppository if needed    Additional findings or notes to primary provider:  None at this time    Discharge Medications:   Discharge Medication List as of 3/1/2022  4:54 PM      START taking these medications    Details   oxyCODONE (ROXICODONE) 5 MG immediate release tablet Take 1 tablet by mouth every 6 hours as needed for Pain for up to 7 days. , Disp-28 tablet, R-0Print      lidocaine 4 % external patch Place 1 patch onto the skin daily, TransDERmal, DAILY Starting Wed 3/2/2022, OTC      bisacodyl (DULCOLAX) 10 MG suppository Place 1 suppository rectally daily as needed for ConstipationOTC      polyethylene glycol (GLYCOLAX) 17 g packet Take 17 g by mouth daily, Disp-527 g, R-1OTC      sennosides-docusate sodium (SENOKOT-S) 8.6-50 MG tablet Take 2 tablets by mouth in the morning and at bedtimeOTC      melatonin 5 MG TBDP disintegrating tablet Take 1 tablet by mouth nightlyOTC      aspirin EC 81 MG EC tablet Take 1 tablet by mouth daily, Disp-30 tablet, R-0OTC      magnesium citrate solution Take 296 mLs by mouth ONCE PRN for ConstipationOTC           Discharge Medication List as of 3/1/2022  4:54 PM        Discharge Medication List as of 3/1/2022  4:54 PM      CONTINUE these medications which have NOT CHANGED    Details   Multiple Vitamin (MULTIVITAMIN) TABS tablet Take 1 tablet by mouth daily, Disp-30 tablet, R-0Normal      thiamine mononitrate (THIAMINE) 100 MG tablet Take 1 tablet by mouth daily, Disp-30 tablet, R-0Normal      pantoprazole sodium (PROTONIX) 40 MG PACK packet Take 40 mg by mouth every morning (before breakfast)Historical Med      valsartan (DIOVAN) 160 MG tablet Take 160 mg by mouth daily 1/2 to 1 tablet daily based on BPHistorical Med      polyethyl glycol-propyl glycol 0.4-0.3 % (SYSTANE) 0.4-0.3 % ophthalmic solution Place 1 drop into both eyes nightly as needed for Dry EyesHistorical Med      zolpidem (AMBIEN CR) 12.5 MG extended release tablet Take 12.5 mg by mouth nightly as needed for Sleep. Historical Med      LORazepam (ATIVAN) 1 MG tablet Take 2 mg by mouth nightly.  Historical Med           Discharge Medication List as of 3/1/2022  4:54 PM      STOP taking these medications       vilazodone HCl (VILAZODONE HCL) 10 MG TABS Comments:   Reason for Stopping:         dexlansoprazole (DEXILANT) 60 MG CPDR delayed release capsule Comments:   Reason for Stopping:         irbesartan (AVAPRO) 300 MG tablet Comments:   Reason for Stopping:               Discharge ROS:  A complete review of systems was asked and negative. Discharge Exam:    /75   Pulse 71   Temp 98 °F (36.7 °C) (Oral)   Resp 20   Ht 5' 10\" (1.778 m)   Wt 235 lb 7.2 oz (106.8 kg)   SpO2 93%   BMI 33.78 kg/m²   General appearance:  NAD  HEENT:   Normal cephalic, atraumatic, moist mucous membranes, no oropharyngeal erythema or exudate  Neck: Supple, trachea midline, no anterior cervical or SC LAD  Heart[de-identified] Normal s1/s2, RRR, no murmurs, gallops, or rubs. No leg edema  Lungs:  Clear to auscultation bilaterally, no wheeze, rales or rhonchi, no use of accessory musclesNormal respiratory effort. Clear to auscultation, bilaterally without Rales/Wheezes/Rhonchi. Abdomen: Soft, non-tender, non-distended, bowel sounds present, no masses  Musculoskeletal:  No clubbing, no cyanosis, or edema. R arm in sling. No swelling. Good pulse, well perfused. Skin: No lesion or masses  Neurologic:  Neurovascularly intact without any focal sensory/motor deficits. Cranial nerves: II-XII intact, grossly non-focal.  Psychiatric:  Alert and oriented, thought content appropriate    Labs:  For convenience and continuity at follow-up the following most recent labs are provided:    Lab Results   Component Value Date    WBC 4.6 03/01/2022    HGB 9.0 03/01/2022    HCT 27.5 03/01/2022    MCV 84.7 03/01/2022     03/01/2022     03/01/2022    K 4.0 03/01/2022    K 3.9 02/26/2022    CL 94 03/01/2022    CO2 25 03/01/2022    BUN 10 03/01/2022    CREATININE <0.5 03/01/2022    CALCIUM 9.8 03/01/2022    ALKPHOS 78 03/01/2022    ALT 26 03/01/2022    AST 48 03/01/2022    BILITOT 0.7 03/01/2022    BILIDIR 0.3 02/25/2022    LABALBU 3.7 03/01/2022    LDLCALC 138 05/15/2021    TRIG 146 05/15/2021     Lab Results   Component Value Date    INR 1.03 08/06/2019       Radiology:  XR CHEST PORTABLE    Result Date: 2/25/2022  Patient: Fe Grewal  Time Out: 02:52 Exam(s): FILM CXR 1 VIEW  EXAM:   XR Chest, 1 View  CLINICAL HISTORY:   intermitted dyspnea. TECHNIQUE:   Frontal view of the chest.  COMPARISON:   No relevant prior studies available. FINDINGS:   Lungs:  No consolidation or mass. Elevated right hemidiaphragm with low right lung volume. Pleural space:  No acute findings   Heart:  No cardiomegaly. Bones/joints:  No acute findings. Electronically signed by Conchita Ryan MD on 02-25-22 at 2742      Elevated right hemidiaphragm with low right lung volume. US ABDOMEN LIMITED    Result Date: 2/28/2022  ULTRASOUND ABDOMEN LIMITED History : abdominal pain Comparison : CT 5/14/2021 COMMENTS : Liver : Diffuse fatty infiltration. Gallbladder and Bile ducts : Gallbladder sludge. Otherwise unremarkable. Pancreas : Normal Right kidney : Normal     IMPRESSION : Hepatic steatosis. Gallbladder sludge. The patient was seen and examined on day of discharge and this discharge summary is in conjunction with any daily progress note from day of discharge. Time Spent on discharge is more than 30 minutes in the examination, evaluation, counseling and review of medications and discharge plan. Signed:    Rica Alegria DO   3/6/2022      Thank you Timothy Delacruz for the opportunity to be involved in this patient's care. If you have any questions or concerns please feel free to contact me at [facility].

## 2022-07-07 ENCOUNTER — HOSPITAL ENCOUNTER (INPATIENT)
Age: 62
LOS: 5 days | Discharge: HOME | DRG: 897 | End: 2022-07-12
Attending: STUDENT IN AN ORGANIZED HEALTH CARE EDUCATION/TRAINING PROGRAM | Admitting: INTERNAL MEDICINE
Payer: COMMERCIAL

## 2022-07-07 DIAGNOSIS — F10.930 ALCOHOL WITHDRAWAL SYNDROME WITHOUT COMPLICATION (HCC): Primary | ICD-10-CM

## 2022-07-07 DIAGNOSIS — E87.1 HYPONATREMIA: ICD-10-CM

## 2022-07-07 LAB
ALBUMIN SERPL-MCNC: 4.7 G/DL (ref 3.4–5)
ALP BLD-CCNC: 80 U/L (ref 40–129)
ALT SERPL-CCNC: 119 U/L (ref 10–40)
ANION GAP SERPL CALCULATED.3IONS-SCNC: 16 MMOL/L (ref 3–16)
AST SERPL-CCNC: 368 U/L (ref 15–37)
BASOPHILS ABSOLUTE: 0 K/UL (ref 0–0.2)
BASOPHILS RELATIVE PERCENT: 0.9 %
BILIRUB SERPL-MCNC: 1.4 MG/DL (ref 0–1)
BILIRUBIN DIRECT: 0.4 MG/DL (ref 0–0.3)
BILIRUBIN, INDIRECT: 1 MG/DL (ref 0–1)
BUN BLDV-MCNC: 7 MG/DL (ref 7–20)
CALCIUM SERPL-MCNC: 10.4 MG/DL (ref 8.3–10.6)
CHLORIDE BLD-SCNC: 83 MMOL/L (ref 99–110)
CO2: 23 MMOL/L (ref 21–32)
CREAT SERPL-MCNC: <0.5 MG/DL (ref 0.6–1.2)
EOSINOPHILS ABSOLUTE: 0 K/UL (ref 0–0.6)
EOSINOPHILS RELATIVE PERCENT: 0.2 %
GFR AFRICAN AMERICAN: >60
GFR NON-AFRICAN AMERICAN: >60
GLUCOSE BLD-MCNC: 103 MG/DL (ref 70–99)
HCT VFR BLD CALC: 34.3 % (ref 36–48)
HEMOGLOBIN: 11.4 G/DL (ref 12–16)
LYMPHOCYTES ABSOLUTE: 0.5 K/UL (ref 1–5.1)
LYMPHOCYTES RELATIVE PERCENT: 11.4 %
MCH RBC QN AUTO: 29.1 PG (ref 26–34)
MCHC RBC AUTO-ENTMCNC: 33.1 G/DL (ref 31–36)
MCV RBC AUTO: 88 FL (ref 80–100)
MONOCYTES ABSOLUTE: 0.2 K/UL (ref 0–1.3)
MONOCYTES RELATIVE PERCENT: 3.3 %
NEUTROPHILS ABSOLUTE: 3.9 K/UL (ref 1.7–7.7)
NEUTROPHILS RELATIVE PERCENT: 84.2 %
PDW BLD-RTO: 17.5 % (ref 12.4–15.4)
PHOSPHORUS: 2.8 MG/DL (ref 2.5–4.9)
PLATELET # BLD: 109 K/UL (ref 135–450)
PMV BLD AUTO: 8.1 FL (ref 5–10.5)
POTASSIUM SERPL-SCNC: 4 MMOL/L (ref 3.5–5.1)
RBC # BLD: 3.9 M/UL (ref 4–5.2)
SODIUM BLD-SCNC: 122 MMOL/L (ref 136–145)
TOTAL PROTEIN: 7.8 G/DL (ref 6.4–8.2)
WBC # BLD: 4.7 K/UL (ref 4–11)

## 2022-07-07 PROCEDURE — 80076 HEPATIC FUNCTION PANEL: CPT

## 2022-07-07 PROCEDURE — 80069 RENAL FUNCTION PANEL: CPT

## 2022-07-07 PROCEDURE — 1200000000 HC SEMI PRIVATE

## 2022-07-07 PROCEDURE — 2580000003 HC RX 258

## 2022-07-07 PROCEDURE — 99285 EMERGENCY DEPT VISIT HI MDM: CPT

## 2022-07-07 PROCEDURE — 6360000002 HC RX W HCPCS

## 2022-07-07 PROCEDURE — 83930 ASSAY OF BLOOD OSMOLALITY: CPT

## 2022-07-07 PROCEDURE — 85025 COMPLETE CBC W/AUTO DIFF WBC: CPT

## 2022-07-07 PROCEDURE — 96374 THER/PROPH/DIAG INJ IV PUSH: CPT

## 2022-07-07 PROCEDURE — 2500000003 HC RX 250 WO HCPCS

## 2022-07-07 PROCEDURE — 93005 ELECTROCARDIOGRAM TRACING: CPT

## 2022-07-07 RX ORDER — DIAZEPAM 10 MG/1
10 TABLET ORAL ONCE
Status: DISCONTINUED | OUTPATIENT
Start: 2022-07-07 | End: 2022-07-07

## 2022-07-07 RX ORDER — LORAZEPAM 2 MG/ML
2 INJECTION INTRAMUSCULAR ONCE
Status: COMPLETED | OUTPATIENT
Start: 2022-07-07 | End: 2022-07-07

## 2022-07-07 RX ADMIN — THIAMINE HYDROCHLORIDE: 100 INJECTION, SOLUTION INTRAMUSCULAR; INTRAVENOUS at 21:16

## 2022-07-07 RX ADMIN — LORAZEPAM 2 MG: 2 INJECTION INTRAMUSCULAR; INTRAVENOUS at 19:41

## 2022-07-07 ASSESSMENT — LIFESTYLE VARIABLES
HOW MANY STANDARD DRINKS CONTAINING ALCOHOL DO YOU HAVE ON A TYPICAL DAY: 10 OR MORE
HOW OFTEN DO YOU HAVE A DRINK CONTAINING ALCOHOL: 4 OR MORE TIMES A WEEK

## 2022-07-07 ASSESSMENT — PAIN SCALES - GENERAL: PAINLEVEL_OUTOF10: 8

## 2022-07-07 ASSESSMENT — ENCOUNTER SYMPTOMS
VOMITING: 1
APNEA: 0
SHORTNESS OF BREATH: 0
CHEST TIGHTNESS: 0
ABDOMINAL PAIN: 1
ABDOMINAL DISTENTION: 0
BACK PAIN: 1
CHOKING: 0
NAUSEA: 1
WHEEZING: 0

## 2022-07-07 ASSESSMENT — PAIN DESCRIPTION - PAIN TYPE: TYPE: ACUTE PAIN

## 2022-07-07 ASSESSMENT — PAIN DESCRIPTION - LOCATION: LOCATION: BACK;NECK

## 2022-07-07 ASSESSMENT — PAIN DESCRIPTION - FREQUENCY: FREQUENCY: CONTINUOUS

## 2022-07-07 ASSESSMENT — PAIN DESCRIPTION - DESCRIPTORS: DESCRIPTORS: ACHING

## 2022-07-07 ASSESSMENT — PAIN - FUNCTIONAL ASSESSMENT: PAIN_FUNCTIONAL_ASSESSMENT: 0-10
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
## 2022-07-07 NOTE — ED PROVIDER NOTES
ED Attending Attestation Note     Date of evaluation: 7/7/2022    This patient was seen by the resident. I have seen and examined the patient, agree with the workup, evaluation, management and diagnosis. The care plan has been discussed. I have reviewed the ECG and concur with the resident's interpretation. My assessment reveals 58-year-old female with a history of alcohol use presenting with withdrawal.  Patient has tremors at rest as well as some tongue fasciculations. She is not tachycardic at this time and was just sent from an outside facility where they cannot manage her current withdrawal symptoms. Patient upon arrival be given IV fluids with multivitamins and some Ativan as well. No evidence of pulmonary symptoms at this time.      [facility] MD Beena  07/07/22 8122

## 2022-07-08 LAB
A/G RATIO: 1.5 (ref 1.1–2.2)
ALBUMIN SERPL-MCNC: 4.4 G/DL (ref 3.4–5)
ALP BLD-CCNC: 75 U/L (ref 40–129)
ALT SERPL-CCNC: 175 U/L (ref 10–40)
ANION GAP SERPL CALCULATED.3IONS-SCNC: 12 MMOL/L (ref 3–16)
ANION GAP SERPL CALCULATED.3IONS-SCNC: 14 MMOL/L (ref 3–16)
ANISOCYTOSIS: ABNORMAL
AST SERPL-CCNC: 454 U/L (ref 15–37)
BASOPHILS ABSOLUTE: 0 K/UL (ref 0–0.2)
BASOPHILS RELATIVE PERCENT: 0.9 %
BILIRUB SERPL-MCNC: 1.2 MG/DL (ref 0–1)
BUN BLDV-MCNC: 6 MG/DL (ref 7–20)
BUN BLDV-MCNC: 8 MG/DL (ref 7–20)
CALCIUM SERPL-MCNC: 10.1 MG/DL (ref 8.3–10.6)
CALCIUM SERPL-MCNC: 9.7 MG/DL (ref 8.3–10.6)
CHLORIDE BLD-SCNC: 90 MMOL/L (ref 99–110)
CHLORIDE BLD-SCNC: 91 MMOL/L (ref 99–110)
CO2: 20 MMOL/L (ref 21–32)
CO2: 22 MMOL/L (ref 21–32)
CREAT SERPL-MCNC: <0.5 MG/DL (ref 0.6–1.2)
CREAT SERPL-MCNC: <0.5 MG/DL (ref 0.6–1.2)
EKG ATRIAL RATE: 62 BPM
EKG DIAGNOSIS: NORMAL
EKG P AXIS: 43 DEGREES
EKG P-R INTERVAL: 168 MS
EKG Q-T INTERVAL: 412 MS
EKG QRS DURATION: 84 MS
EKG QTC CALCULATION (BAZETT): 418 MS
EKG R AXIS: 25 DEGREES
EKG T AXIS: 69 DEGREES
EKG VENTRICULAR RATE: 62 BPM
EOSINOPHILS ABSOLUTE: 0.1 K/UL (ref 0–0.6)
EOSINOPHILS RELATIVE PERCENT: 2.4 %
GFR AFRICAN AMERICAN: >60
GFR AFRICAN AMERICAN: >60
GFR NON-AFRICAN AMERICAN: >60
GFR NON-AFRICAN AMERICAN: >60
GLUCOSE BLD-MCNC: 97 MG/DL (ref 70–99)
GLUCOSE BLD-MCNC: 99 MG/DL (ref 70–99)
HCT VFR BLD CALC: 32.2 % (ref 36–48)
HEMOGLOBIN: 10.6 G/DL (ref 12–16)
LYMPHOCYTES ABSOLUTE: 0.8 K/UL (ref 1–5.1)
LYMPHOCYTES RELATIVE PERCENT: 20.6 %
MCH RBC QN AUTO: 29.3 PG (ref 26–34)
MCHC RBC AUTO-ENTMCNC: 32.9 G/DL (ref 31–36)
MCV RBC AUTO: 89.2 FL (ref 80–100)
MONOCYTES ABSOLUTE: 0.2 K/UL (ref 0–1.3)
MONOCYTES RELATIVE PERCENT: 4.3 %
NEUTROPHILS ABSOLUTE: 2.9 K/UL (ref 1.7–7.7)
NEUTROPHILS RELATIVE PERCENT: 71.8 %
OSMOLALITY: 258 MOSM/KG (ref 278–305)
PDW BLD-RTO: 17.5 % (ref 12.4–15.4)
PLATELET # BLD: ABNORMAL K/UL (ref 135–450)
PLATELET SLIDE REVIEW: ABNORMAL
PMV BLD AUTO: ABNORMAL FL (ref 5–10.5)
POTASSIUM REFLEX MAGNESIUM: 4 MMOL/L (ref 3.5–5.1)
POTASSIUM SERPL-SCNC: 3.8 MMOL/L (ref 3.5–5.1)
RBC # BLD: 3.61 M/UL (ref 4–5.2)
SCHISTOCYTES: ABNORMAL
SODIUM BLD-SCNC: 124 MMOL/L (ref 136–145)
SODIUM BLD-SCNC: 125 MMOL/L (ref 136–145)
TOTAL PROTEIN: 7.4 G/DL (ref 6.4–8.2)
WBC # BLD: 4.1 K/UL (ref 4–11)

## 2022-07-08 PROCEDURE — 2500000003 HC RX 250 WO HCPCS: Performed by: INTERNAL MEDICINE

## 2022-07-08 PROCEDURE — 80053 COMPREHEN METABOLIC PANEL: CPT

## 2022-07-08 PROCEDURE — 6360000002 HC RX W HCPCS: Performed by: INTERNAL MEDICINE

## 2022-07-08 PROCEDURE — 6370000000 HC RX 637 (ALT 250 FOR IP): Performed by: INTERNAL MEDICINE

## 2022-07-08 PROCEDURE — 36415 COLL VENOUS BLD VENIPUNCTURE: CPT

## 2022-07-08 PROCEDURE — 2580000003 HC RX 258: Performed by: INTERNAL MEDICINE

## 2022-07-08 PROCEDURE — 1200000000 HC SEMI PRIVATE

## 2022-07-08 PROCEDURE — 85025 COMPLETE CBC W/AUTO DIFF WBC: CPT

## 2022-07-08 RX ORDER — ONDANSETRON 4 MG/1
4 TABLET, ORALLY DISINTEGRATING ORAL EVERY 8 HOURS PRN
Status: DISCONTINUED | OUTPATIENT
Start: 2022-07-08 | End: 2022-07-12 | Stop reason: HOSPADM

## 2022-07-08 RX ORDER — MULTIVITAMIN WITH IRON
1 TABLET ORAL DAILY
Status: DISCONTINUED | OUTPATIENT
Start: 2022-07-08 | End: 2022-07-08

## 2022-07-08 RX ORDER — LORAZEPAM 1 MG/1
4 TABLET ORAL
Status: DISCONTINUED | OUTPATIENT
Start: 2022-07-08 | End: 2022-07-12 | Stop reason: HOSPADM

## 2022-07-08 RX ORDER — MIDAZOLAM HYDROCHLORIDE 1 MG/ML
6 INJECTION INTRAMUSCULAR; INTRAVENOUS
Status: DISCONTINUED | OUTPATIENT
Start: 2022-07-08 | End: 2022-07-12 | Stop reason: HOSPADM

## 2022-07-08 RX ORDER — GAUZE BANDAGE 2" X 2"
100 BANDAGE TOPICAL DAILY
Status: DISCONTINUED | OUTPATIENT
Start: 2022-07-08 | End: 2022-07-08

## 2022-07-08 RX ORDER — SODIUM CHLORIDE 0.9 % (FLUSH) 0.9 %
5-40 SYRINGE (ML) INJECTION EVERY 12 HOURS SCHEDULED
Status: DISCONTINUED | OUTPATIENT
Start: 2022-07-08 | End: 2022-07-12 | Stop reason: HOSPADM

## 2022-07-08 RX ORDER — POTASSIUM CHLORIDE 20 MEQ/1
40 TABLET, EXTENDED RELEASE ORAL PRN
Status: DISCONTINUED | OUTPATIENT
Start: 2022-07-08 | End: 2022-07-12 | Stop reason: HOSPADM

## 2022-07-08 RX ORDER — LORAZEPAM 1 MG/1
1 TABLET ORAL
Status: DISCONTINUED | OUTPATIENT
Start: 2022-07-08 | End: 2022-07-12 | Stop reason: HOSPADM

## 2022-07-08 RX ORDER — POTASSIUM CHLORIDE 7.45 MG/ML
10 INJECTION INTRAVENOUS PRN
Status: DISCONTINUED | OUTPATIENT
Start: 2022-07-08 | End: 2022-07-12 | Stop reason: HOSPADM

## 2022-07-08 RX ORDER — LORAZEPAM 1 MG/1
2 TABLET ORAL
Status: DISCONTINUED | OUTPATIENT
Start: 2022-07-08 | End: 2022-07-12 | Stop reason: HOSPADM

## 2022-07-08 RX ORDER — ENOXAPARIN SODIUM 100 MG/ML
40 INJECTION SUBCUTANEOUS DAILY
Status: DISCONTINUED | OUTPATIENT
Start: 2022-07-08 | End: 2022-07-12 | Stop reason: HOSPADM

## 2022-07-08 RX ORDER — LORAZEPAM 2 MG/ML
1 INJECTION INTRAMUSCULAR
Status: DISCONTINUED | OUTPATIENT
Start: 2022-07-08 | End: 2022-07-08

## 2022-07-08 RX ORDER — LORAZEPAM 2 MG/ML
4 INJECTION INTRAMUSCULAR
Status: DISCONTINUED | OUTPATIENT
Start: 2022-07-08 | End: 2022-07-08

## 2022-07-08 RX ORDER — LORAZEPAM 1 MG/1
3 TABLET ORAL
Status: DISCONTINUED | OUTPATIENT
Start: 2022-07-08 | End: 2022-07-12 | Stop reason: HOSPADM

## 2022-07-08 RX ORDER — POLYETHYLENE GLYCOL 3350 17 G/17G
17 POWDER, FOR SOLUTION ORAL DAILY PRN
Status: DISCONTINUED | OUTPATIENT
Start: 2022-07-08 | End: 2022-07-12 | Stop reason: HOSPADM

## 2022-07-08 RX ORDER — SODIUM CHLORIDE 9 MG/ML
INJECTION, SOLUTION INTRAVENOUS PRN
Status: DISCONTINUED | OUTPATIENT
Start: 2022-07-08 | End: 2022-07-12 | Stop reason: HOSPADM

## 2022-07-08 RX ORDER — MAGNESIUM SULFATE IN WATER 40 MG/ML
2000 INJECTION, SOLUTION INTRAVENOUS PRN
Status: DISCONTINUED | OUTPATIENT
Start: 2022-07-08 | End: 2022-07-12 | Stop reason: HOSPADM

## 2022-07-08 RX ORDER — MIDAZOLAM HYDROCHLORIDE 1 MG/ML
4 INJECTION, SOLUTION INTRAMUSCULAR; INTRAVENOUS
Status: DISCONTINUED | OUTPATIENT
Start: 2022-07-08 | End: 2022-07-12 | Stop reason: HOSPADM

## 2022-07-08 RX ORDER — MIDAZOLAM HYDROCHLORIDE 1 MG/ML
8 INJECTION INTRAMUSCULAR; INTRAVENOUS
Status: DISCONTINUED | OUTPATIENT
Start: 2022-07-08 | End: 2022-07-12 | Stop reason: HOSPADM

## 2022-07-08 RX ORDER — LORAZEPAM 2 MG/ML
3 INJECTION INTRAMUSCULAR
Status: DISCONTINUED | OUTPATIENT
Start: 2022-07-08 | End: 2022-07-08

## 2022-07-08 RX ORDER — MIDAZOLAM HYDROCHLORIDE 1 MG/ML
2 INJECTION INTRAMUSCULAR; INTRAVENOUS
Status: DISCONTINUED | OUTPATIENT
Start: 2022-07-08 | End: 2022-07-12 | Stop reason: HOSPADM

## 2022-07-08 RX ORDER — LORAZEPAM 2 MG/ML
2 INJECTION INTRAMUSCULAR
Status: DISCONTINUED | OUTPATIENT
Start: 2022-07-08 | End: 2022-07-08

## 2022-07-08 RX ORDER — ONDANSETRON 2 MG/ML
4 INJECTION INTRAMUSCULAR; INTRAVENOUS EVERY 6 HOURS PRN
Status: DISCONTINUED | OUTPATIENT
Start: 2022-07-08 | End: 2022-07-12 | Stop reason: HOSPADM

## 2022-07-08 RX ORDER — SODIUM CHLORIDE 9 MG/ML
INJECTION, SOLUTION INTRAVENOUS CONTINUOUS
Status: DISCONTINUED | OUTPATIENT
Start: 2022-07-08 | End: 2022-07-08

## 2022-07-08 RX ORDER — SODIUM CHLORIDE 0.9 % (FLUSH) 0.9 %
5-40 SYRINGE (ML) INJECTION PRN
Status: DISCONTINUED | OUTPATIENT
Start: 2022-07-08 | End: 2022-07-12 | Stop reason: HOSPADM

## 2022-07-08 RX ADMIN — ENOXAPARIN SODIUM 40 MG: 40 INJECTION SUBCUTANEOUS at 10:03

## 2022-07-08 RX ADMIN — LORAZEPAM 2 MG: 1 TABLET ORAL at 21:41

## 2022-07-08 RX ADMIN — MIDAZOLAM HYDROCHLORIDE 4 MG: 1 INJECTION, SOLUTION INTRAMUSCULAR; INTRAVENOUS at 01:59

## 2022-07-08 RX ADMIN — Medication 100 MG: at 10:01

## 2022-07-08 RX ADMIN — ONDANSETRON 4 MG: 2 INJECTION INTRAMUSCULAR; INTRAVENOUS at 21:42

## 2022-07-08 RX ADMIN — THIAMINE HYDROCHLORIDE: 100 INJECTION, SOLUTION INTRAMUSCULAR; INTRAVENOUS at 16:21

## 2022-07-08 RX ADMIN — LORAZEPAM 1 MG: 1 TABLET ORAL at 10:15

## 2022-07-08 RX ADMIN — ONDANSETRON 4 MG: 2 INJECTION INTRAMUSCULAR; INTRAVENOUS at 01:59

## 2022-07-08 RX ADMIN — THERA TABS 1 TABLET: TAB at 10:01

## 2022-07-08 RX ADMIN — SODIUM CHLORIDE: 9 INJECTION, SOLUTION INTRAVENOUS at 12:37

## 2022-07-08 ASSESSMENT — PAIN DESCRIPTION - LOCATION: LOCATION: ARM;LEG

## 2022-07-08 ASSESSMENT — PAIN SCALES - GENERAL
PAINLEVEL_OUTOF10: 7
PAINLEVEL_OUTOF10: 8

## 2022-07-08 NOTE — ED NOTES
Report given by telephone to RN on [address]. PT belongings placed in bags, pt to be transported via stretcher. PT updated on room assignment.       Ila Pennington RN  07/08/22 8941
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
## 2022-07-08 NOTE — ED NOTES
PT assisted to ambulate to restroom, pt tolerated fairly well. Bed locked lowest position, non skid footwear in place, call light in reach.      Shera Apgar, RN  22 [id number]
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
## 2022-07-08 NOTE — PLAN OF CARE
Problem: Pain  Goal: Verbalizes/displays adequate comfort level or baseline comfort level  Outcome: Progressing   Patient verbalized pain. No prn pain meds given   Problem: Safety - Adult  Goal: Free from fall injury  Outcome: Progressing   Patient bed alrm is on low and locked.  Patient calls appropriatley

## 2022-07-09 LAB
ALBUMIN SERPL-MCNC: 4 G/DL (ref 3.4–5)
ALP BLD-CCNC: 74 U/L (ref 40–129)
ALT SERPL-CCNC: 129 U/L (ref 10–40)
ANION GAP SERPL CALCULATED.3IONS-SCNC: 11 MMOL/L (ref 3–16)
ANION GAP SERPL CALCULATED.3IONS-SCNC: 12 MMOL/L (ref 3–16)
AST SERPL-CCNC: 186 U/L (ref 15–37)
BILIRUB SERPL-MCNC: 1 MG/DL (ref 0–1)
BILIRUBIN DIRECT: 0.3 MG/DL (ref 0–0.3)
BILIRUBIN, INDIRECT: 0.7 MG/DL (ref 0–1)
BUN BLDV-MCNC: 7 MG/DL (ref 7–20)
BUN BLDV-MCNC: 7 MG/DL (ref 7–20)
CALCIUM SERPL-MCNC: 9.6 MG/DL (ref 8.3–10.6)
CALCIUM SERPL-MCNC: 9.6 MG/DL (ref 8.3–10.6)
CHLORIDE BLD-SCNC: 93 MMOL/L (ref 99–110)
CHLORIDE BLD-SCNC: 95 MMOL/L (ref 99–110)
CO2: 20 MMOL/L (ref 21–32)
CO2: 22 MMOL/L (ref 21–32)
CREAT SERPL-MCNC: <0.5 MG/DL (ref 0.6–1.2)
CREAT SERPL-MCNC: <0.5 MG/DL (ref 0.6–1.2)
GFR AFRICAN AMERICAN: >60
GFR AFRICAN AMERICAN: >60
GFR NON-AFRICAN AMERICAN: >60
GFR NON-AFRICAN AMERICAN: >60
GLUCOSE BLD-MCNC: 89 MG/DL (ref 70–99)
GLUCOSE BLD-MCNC: 90 MG/DL (ref 70–99)
HCT VFR BLD CALC: 30.2 % (ref 36–48)
HEMOGLOBIN: 10 G/DL (ref 12–16)
MAGNESIUM: 1.8 MG/DL (ref 1.8–2.4)
MCH RBC QN AUTO: 29.4 PG (ref 26–34)
MCHC RBC AUTO-ENTMCNC: 33.2 G/DL (ref 31–36)
MCV RBC AUTO: 88.6 FL (ref 80–100)
PDW BLD-RTO: 17.4 % (ref 12.4–15.4)
PLATELET # BLD: ABNORMAL K/UL (ref 135–450)
PLATELET SLIDE REVIEW: ABNORMAL
PMV BLD AUTO: ABNORMAL FL (ref 5–10.5)
POTASSIUM SERPL-SCNC: 3.6 MMOL/L (ref 3.5–5.1)
POTASSIUM SERPL-SCNC: 3.6 MMOL/L (ref 3.5–5.1)
RBC # BLD: 3.4 M/UL (ref 4–5.2)
SODIUM BLD-SCNC: 126 MMOL/L (ref 136–145)
SODIUM BLD-SCNC: 127 MMOL/L (ref 136–145)
TOTAL PROTEIN: 7 G/DL (ref 6.4–8.2)
WBC # BLD: 3.3 K/UL (ref 4–11)

## 2022-07-09 PROCEDURE — 85027 COMPLETE CBC AUTOMATED: CPT

## 2022-07-09 PROCEDURE — 6370000000 HC RX 637 (ALT 250 FOR IP): Performed by: INTERNAL MEDICINE

## 2022-07-09 PROCEDURE — 80076 HEPATIC FUNCTION PANEL: CPT

## 2022-07-09 PROCEDURE — 36415 COLL VENOUS BLD VENIPUNCTURE: CPT

## 2022-07-09 PROCEDURE — 6360000002 HC RX W HCPCS: Performed by: INTERNAL MEDICINE

## 2022-07-09 PROCEDURE — 2580000003 HC RX 258: Performed by: INTERNAL MEDICINE

## 2022-07-09 PROCEDURE — 2500000003 HC RX 250 WO HCPCS: Performed by: INTERNAL MEDICINE

## 2022-07-09 PROCEDURE — 80048 BASIC METABOLIC PNL TOTAL CA: CPT

## 2022-07-09 PROCEDURE — 83735 ASSAY OF MAGNESIUM: CPT

## 2022-07-09 PROCEDURE — 1200000000 HC SEMI PRIVATE

## 2022-07-09 RX ADMIN — LORAZEPAM 3 MG: 1 TABLET ORAL at 10:10

## 2022-07-09 RX ADMIN — LORAZEPAM 3 MG: 1 TABLET ORAL at 08:20

## 2022-07-09 RX ADMIN — LORAZEPAM 2 MG: 1 TABLET ORAL at 17:05

## 2022-07-09 RX ADMIN — SODIUM CHLORIDE, PRESERVATIVE FREE 10 ML: 5 INJECTION INTRAVENOUS at 08:21

## 2022-07-09 RX ADMIN — THIAMINE HYDROCHLORIDE: 100 INJECTION, SOLUTION INTRAMUSCULAR; INTRAVENOUS at 08:03

## 2022-07-09 RX ADMIN — LORAZEPAM 2 MG: 1 TABLET ORAL at 21:37

## 2022-07-09 RX ADMIN — SODIUM CHLORIDE, PRESERVATIVE FREE 10 ML: 5 INJECTION INTRAVENOUS at 21:05

## 2022-07-09 RX ADMIN — LORAZEPAM 2 MG: 1 TABLET ORAL at 12:51

## 2022-07-09 RX ADMIN — LORAZEPAM 2 MG: 1 TABLET ORAL at 14:53
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
## 2022-07-09 NOTE — PROGRESS NOTES
Pt very emotional this morning, crying easily, has visible tremors, states she is anxious, becoming restless about weakness and lab value/draws. States she has not felt this bad before when detoxing and never felt like this when she was drinking. Talked to pt about emotions and the detox process. Scored pt with CIWA and medicated per scale.

## 2022-07-09 NOTE — PROGRESS NOTES
Pt in better moods, still has tremors and emotional, but feeling more hopeful, ambulates to the BR and around room well. Educated pt about fluid intake and low sodium.

## 2022-07-09 NOTE — PROGRESS NOTES
Pt was medicated with Ativan 2 mg po per CIWA scale and Zofran 4 mg iv for complaints of nausea. Vital signs stable, afebrile. Call light in reach, bed alarm in place.

## 2022-07-10 LAB
A/G RATIO: 1.4 (ref 1.1–2.2)
ALBUMIN SERPL-MCNC: 4 G/DL (ref 3.4–5)
ALP BLD-CCNC: 72 U/L (ref 40–129)
ALT SERPL-CCNC: 97 U/L (ref 10–40)
ANION GAP SERPL CALCULATED.3IONS-SCNC: 10 MMOL/L (ref 3–16)
AST SERPL-CCNC: 100 U/L (ref 15–37)
BILIRUB SERPL-MCNC: 0.6 MG/DL (ref 0–1)
BUN BLDV-MCNC: 6 MG/DL (ref 7–20)
CALCIUM SERPL-MCNC: 9.8 MG/DL (ref 8.3–10.6)
CHLORIDE BLD-SCNC: 96 MMOL/L (ref 99–110)
CO2: 25 MMOL/L (ref 21–32)
CREAT SERPL-MCNC: <0.5 MG/DL (ref 0.6–1.2)
FERRITIN: 66.5 NG/ML (ref 15–150)
FOLATE: 10.08 NG/ML (ref 4.78–24.2)
GFR AFRICAN AMERICAN: >60
GFR NON-AFRICAN AMERICAN: >60
GLUCOSE BLD-MCNC: 104 MG/DL (ref 70–99)
IRON SATURATION: 9 % (ref 15–50)
IRON: 25 UG/DL (ref 37–145)
PHOSPHORUS: 4.1 MG/DL (ref 2.5–4.9)
POTASSIUM REFLEX MAGNESIUM: 4.3 MMOL/L (ref 3.5–5.1)
SODIUM BLD-SCNC: 131 MMOL/L (ref 136–145)
TOTAL IRON BINDING CAPACITY: 269 UG/DL (ref 260–445)
TOTAL PROTEIN: 6.8 G/DL (ref 6.4–8.2)
VITAMIN B-12: 517 PG/ML (ref 211–911)

## 2022-07-10 PROCEDURE — 6360000002 HC RX W HCPCS: Performed by: INTERNAL MEDICINE

## 2022-07-10 PROCEDURE — 83921 ORGANIC ACID SINGLE QUANT: CPT

## 2022-07-10 PROCEDURE — 1200000000 HC SEMI PRIVATE

## 2022-07-10 PROCEDURE — 82607 VITAMIN B-12: CPT

## 2022-07-10 PROCEDURE — 36415 COLL VENOUS BLD VENIPUNCTURE: CPT

## 2022-07-10 PROCEDURE — 80053 COMPREHEN METABOLIC PANEL: CPT

## 2022-07-10 PROCEDURE — 82746 ASSAY OF FOLIC ACID SERUM: CPT

## 2022-07-10 PROCEDURE — 2580000003 HC RX 258: Performed by: INTERNAL MEDICINE

## 2022-07-10 PROCEDURE — 84100 ASSAY OF PHOSPHORUS: CPT

## 2022-07-10 PROCEDURE — 83550 IRON BINDING TEST: CPT

## 2022-07-10 PROCEDURE — 6370000000 HC RX 637 (ALT 250 FOR IP): Performed by: INTERNAL MEDICINE

## 2022-07-10 PROCEDURE — 2500000003 HC RX 250 WO HCPCS: Performed by: INTERNAL MEDICINE

## 2022-07-10 PROCEDURE — 82728 ASSAY OF FERRITIN: CPT

## 2022-07-10 PROCEDURE — 83540 ASSAY OF IRON: CPT

## 2022-07-10 RX ORDER — ZOLPIDEM TARTRATE 5 MG/1
10 TABLET ORAL ONCE
Status: COMPLETED | OUTPATIENT
Start: 2022-07-10 | End: 2022-07-10

## 2022-07-10 RX ADMIN — SODIUM CHLORIDE, PRESERVATIVE FREE 10 ML: 5 INJECTION INTRAVENOUS at 09:17

## 2022-07-10 RX ADMIN — LORAZEPAM 2 MG: 1 TABLET ORAL at 20:32

## 2022-07-10 RX ADMIN — IRON SUCROSE 300 MG: 20 INJECTION, SOLUTION INTRAVENOUS at 20:10

## 2022-07-10 RX ADMIN — ZOLPIDEM TARTRATE 10 MG: 5 TABLET ORAL at 23:34

## 2022-07-10 RX ADMIN — SODIUM CHLORIDE 25 ML: 9 INJECTION, SOLUTION INTRAVENOUS at 20:08

## 2022-07-10 RX ADMIN — LORAZEPAM 3 MG: 1 TABLET ORAL at 13:09

## 2022-07-10 RX ADMIN — THIAMINE HYDROCHLORIDE: 100 INJECTION, SOLUTION INTRAMUSCULAR; INTRAVENOUS at 09:16

## 2022-07-10 RX ADMIN — LORAZEPAM 2 MG: 1 TABLET ORAL at 09:18

## 2022-07-10 NOTE — PLAN OF CARE
Problem: Discharge Planning  Goal: Discharge to home or other facility with appropriate resources  7/10/2022 1150 by Wili Rascon RN  Outcome: Progressing     Problem: Safety - Adult  Goal: Free from fall injury  7/10/2022 1150 by Wili Rascon RN  Outcome: Progressing
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
## 2022-07-11 LAB
A/G RATIO: 1.4 (ref 1.1–2.2)
ALBUMIN SERPL-MCNC: 3.9 G/DL (ref 3.4–5)
ALP BLD-CCNC: 68 U/L (ref 40–129)
ALT SERPL-CCNC: 69 U/L (ref 10–40)
ANION GAP SERPL CALCULATED.3IONS-SCNC: 10 MMOL/L (ref 3–16)
AST SERPL-CCNC: 59 U/L (ref 15–37)
BILIRUB SERPL-MCNC: 0.7 MG/DL (ref 0–1)
BUN BLDV-MCNC: 6 MG/DL (ref 7–20)
CALCIUM SERPL-MCNC: 9.6 MG/DL (ref 8.3–10.6)
CHLORIDE BLD-SCNC: 98 MMOL/L (ref 99–110)
CO2: 24 MMOL/L (ref 21–32)
CREAT SERPL-MCNC: <0.5 MG/DL (ref 0.6–1.2)
GFR AFRICAN AMERICAN: >60
GFR NON-AFRICAN AMERICAN: >60
GLUCOSE BLD-MCNC: 108 MG/DL (ref 70–99)
POTASSIUM REFLEX MAGNESIUM: 3.8 MMOL/L (ref 3.5–5.1)
SODIUM BLD-SCNC: 132 MMOL/L (ref 136–145)
TOTAL PROTEIN: 6.6 G/DL (ref 6.4–8.2)

## 2022-07-11 PROCEDURE — 97162 PT EVAL MOD COMPLEX 30 MIN: CPT

## 2022-07-11 PROCEDURE — 97530 THERAPEUTIC ACTIVITIES: CPT

## 2022-07-11 PROCEDURE — 1200000000 HC SEMI PRIVATE

## 2022-07-11 PROCEDURE — 36415 COLL VENOUS BLD VENIPUNCTURE: CPT

## 2022-07-11 PROCEDURE — 6360000002 HC RX W HCPCS: Performed by: INTERNAL MEDICINE

## 2022-07-11 PROCEDURE — 6370000000 HC RX 637 (ALT 250 FOR IP): Performed by: INTERNAL MEDICINE

## 2022-07-11 PROCEDURE — A4216 STERILE WATER/SALINE, 10 ML: HCPCS | Performed by: INTERNAL MEDICINE

## 2022-07-11 PROCEDURE — 97535 SELF CARE MNGMENT TRAINING: CPT

## 2022-07-11 PROCEDURE — 80053 COMPREHEN METABOLIC PANEL: CPT

## 2022-07-11 PROCEDURE — 94761 N-INVAS EAR/PLS OXIMETRY MLT: CPT

## 2022-07-11 PROCEDURE — 97116 GAIT TRAINING THERAPY: CPT

## 2022-07-11 PROCEDURE — 2580000003 HC RX 258: Performed by: INTERNAL MEDICINE

## 2022-07-11 PROCEDURE — 2500000003 HC RX 250 WO HCPCS: Performed by: INTERNAL MEDICINE

## 2022-07-11 PROCEDURE — 97166 OT EVAL MOD COMPLEX 45 MIN: CPT

## 2022-07-11 RX ADMIN — LORAZEPAM 2 MG: 1 TABLET ORAL at 08:36

## 2022-07-11 RX ADMIN — SODIUM CHLORIDE, PRESERVATIVE FREE 10 ML: 5 INJECTION INTRAVENOUS at 08:28

## 2022-07-11 RX ADMIN — LORAZEPAM 3 MG: 1 TABLET ORAL at 20:33

## 2022-07-11 RX ADMIN — SODIUM CHLORIDE, PRESERVATIVE FREE 20 MG: 5 INJECTION INTRAVENOUS at 20:34

## 2022-07-11 RX ADMIN — SODIUM CHLORIDE, PRESERVATIVE FREE 20 MG: 5 INJECTION INTRAVENOUS at 10:28

## 2022-07-11 RX ADMIN — LORAZEPAM 1 MG: 1 TABLET ORAL at 10:44

## 2022-07-11 RX ADMIN — ONDANSETRON 4 MG: 2 INJECTION INTRAMUSCULAR; INTRAVENOUS at 08:27

## 2022-07-11 RX ADMIN — SODIUM CHLORIDE, PRESERVATIVE FREE 10 ML: 5 INJECTION INTRAVENOUS at 20:34

## 2022-07-11 RX ADMIN — LORAZEPAM 2 MG: 1 TABLET ORAL at 14:01

## 2022-07-11 RX ADMIN — THIAMINE HYDROCHLORIDE: 100 INJECTION, SOLUTION INTRAMUSCULAR; INTRAVENOUS at 10:36

## 2022-07-11 RX ADMIN — IRON SUCROSE 300 MG: 20 INJECTION, SOLUTION INTRAVENOUS at 14:24

## 2022-07-11 ASSESSMENT — PAIN DESCRIPTION - ORIENTATION
ORIENTATION: MID

## 2022-07-11 ASSESSMENT — PAIN DESCRIPTION - LOCATION
LOCATION: ABDOMEN

## 2022-07-11 ASSESSMENT — PAIN SCALES - GENERAL
PAINLEVEL_OUTOF10: 4
PAINLEVEL_OUTOF10: 6
PAINLEVEL_OUTOF10: 3

## 2022-07-11 ASSESSMENT — PAIN DESCRIPTION - DESCRIPTORS
DESCRIPTORS: ACHING
DESCRIPTORS: ACHING;DISCOMFORT
DESCRIPTORS: DISCOMFORT

## 2022-07-11 NOTE — PLAN OF CARE
Problem: Discharge Planning  Goal: Discharge to home or other facility with appropriate resources  Outcome: Progressing: d/c teaching continue and pt verbalized undestanding,. Problem: Safety - Adult  Goal: Free from fall injury  Outcome: Progressing: Fall measures in place to prevent falls and alert staff for assistance.

## 2022-07-11 NOTE — CARE COORDINATION
Case Management Assessment           Initial Evaluation                Date / Time of Evaluation: 7/11/2022 3:08 PM                 Assessment Completed by: MARIA C Johnson, NEOCHW    Patient Name: Canelo Noonan     YOB: 1960  Diagnosis: Hyponatremia [E87.1]  Alcohol withdrawal, uncomplicated ([facility] [B19.892]  Alcohol withdrawal syndrome without complication [facility]) [U47.173]     Date / Time: 7/7/2022  5:28 PM    Patient Admission Status: Inpatient    If patient is discharged prior to next notation, then this note serves as note for discharge by case management. Current PCP: Jasmyne Torres Patient: No    Chart Reviewed: Yes  Patient/ Family Interviewed: Yes    Initial assessment completed at bedside with: patient    Hospitalization in the last 30 days: No    Emergency Contacts:  Extended Emergency Contact Information  Primary Emergency Contact: Paul Ulrich  Address: [address] [place] [address], [address] [place] [address] Phone: [phone]  Work Phone: [phone]  Mobile Phone: [phone]  Relation: Spouse    Advance Directives:   Code Status: [address]: No    Financial  Payor: Genie Caballero / Plan: [address]  / Product Type: *No Product type* /     Pre-cert required for SNF: Yes    Pharmacy    [address] [phone] - F [phone]  [address] [facility] [address]  Phone: [phone] Fax: [phone]      Potential assistance Purchasing Medications:    Does Patient want to participate in local refill/ meds to beds program?: Yes    Meds To Beds General Rules:  1. Can ONLY be done Monday- Friday between 8:30am-5pm  2. Prescription(s) must be in pharmacy by 3pm to be filled same day  3. Copy of patient's insurance/ prescription drug card and patient face sheet must be sent along with the prescription(s)  4.  Cost of Rx cannot be added to hospital bill. If financial assistance is needed, please contact unit  or ;  or  CANNOT provide pharmacy voucher for patients co-pays  5. Patients can then  the prescription on their way out of the hospital at discharge, or pharmacy can deliver to the bedside if staff is available. (payment due at time of pick-up or delivery - cash, check, or card accepted)     Able to afford home medications/ co-pay costs: Yes    ADLS  Support Systems:      PT AM-PAC:   OT AM-PAC:       [place]: home  Steps:     Plans to RETURN to current housing: Yes  Barriers to RETURNING to current housin [address]  Currently ACTIVE with [employer] [address]: No    Currently ACTIVE with [place] on Aging: No      Durable Medical Equipment  Equipment: NA    Home Oxygen and [address] [place] prior to admission: No    Dialysis  Active with HD/PD prior to admission: No      DISCHARGE PLAN:  Disposition: Home- No Services Needed    Transportation PLAN for discharge: family     Factors facilitating achievement of predicted outcomes: Family support    Barriers to discharge: Medical complications    Additional Case Management Notes:  Pt is from [facility]. CM spoke with staff there who confirmed that pt was there for detox, and they have a bed held for pt. Pt reported wanting to DC home instead of returning to the [facility]. Pt is interested in [facility]; liaison following and pt has [facility] list per her request.  No DME needed. CM will continue to follow for DC needs and recs.         The Plan for Transition of Care is related to the following treatment goals of Hyponatremia [E87.1]  Alcohol withdrawal, uncomplicated ([facility] [X73.511]  Alcohol withdrawal syndrome without complication ([facility] [B05.187]    The Patient and/or patient representative Bernabe Paris and her family were provided with a choice of provider and agrees with the discharge plan Yes    Freedom of choice list was provided with basic dialogue that supports the patient's individualized plan of care/goals and shares the quality data associated with the providers.  Yes    Care Transition patient: No    MARIA C Loaiza, [facility]  Case Management Department  Ph: [phone]   Fax: [phone]

## 2022-07-11 NOTE — PROGRESS NOTES
Physical Therapy  Facility/Department: [facility]  Physical Therapy Initial Assessment/discharge note      Name: Belen Tuttle  : 1960  MRN: [id number]  Date of Service: 2022    Discharge Recommendations:Jennifer Ulrich scored a 22/24 on the AM-PAC short mobility form. Current research shows that an AM-PAC score of 18 or greater is typically associated with a discharge to the patient's home setting. Based on the patient's AM-PAC score and their current functional mobility deficits, it is recommended that the patient have 2-3 sessions per week of Physical Therapy at d/c to increase the patient's independence. At this time, this patient demonstrates the endurance and safety to discharge home with  (home vs OP services) and a follow up treatment frequency of 2-3x/wk. Please see assessment section for further patient specific details. PT Equipment Recommendations  Equipment Needed:  (rolling walker)      Patient Diagnosis(es): The primary encounter diagnosis was Alcohol withdrawal syndrome without complication ([address] A diagnosis of Hyponatremia was also pertinent to this visit. Past Medical History:  has a past medical history of C. difficile diarrhea, CAD (coronary artery disease), Cervical dystonia, CHF (congestive heart failure) ([address] Dental crown present, Fatty liver, Hyperlipidemia, Hypertension, Lichen sclerosus, and PONV (postoperative nausea and vomiting). Past Surgical History:  has a past surgical history that includes Breast surgery; Endoscopy, colon, diagnostic; Colonoscopy; Endometrial ablation; Colonoscopy (N/A, 2019); and Colonoscopy (2019). Assessment   Assessment: 59 yo admitted 22 from alcohol detox facility for alcohol withdrawl. Pt demo good mobility with RW and states she plans to return home and not back to detox center.  If home, recommend 24 hour close assist, use of RW, and home PT safety eval. Pt with limited insight into her situation/deficits. Will sign off for acute PT and recommend nursing continue to encourage ambulation PRN and often with RW while pt in hospital.  Treatment Diagnosis: mobility impairment due to alcohol withdrawl  Decision Making: Medium Complexity  Requires PT Follow-Up: No  Activity Tolerance  Activity Tolerance: Treatment limited secondary to decreased cognition;Patient tolerated treatment well     Plan   Safety Devices  Type of Devices: Left in chair,Chair alarm in place,Call light within reach,Nurse notified     Restrictions  Position Activity Restriction  Other position/activity restrictions: up with assist     Subjective   General  Chart Reviewed: Yes  Additional Pertinent Hx: 60 y.o. female with history of alcoholism, humeral head fracture, anxiety, cervical dystonia, GERD, fatty liver, hypertension who presented to ED 7/7/22 from a detox facility with signs and symptoms of alcohol withdrawal.  Family / Caregiver Present: No  Diagnosis: ETOH withdrawl  Follows Commands: Within Functional Limits  Subjective  Subjective: Pt found supine in bed and agreeable to PT. Pt denies any pain. \" Is there something else wrong with me that you're not telling me? All of this trouble with my walking started when I stopped drinking. \"         Social/Functional History  Social/Functional History  Lives With: Spouse  Type of Home: House  Home Layout: Multi-level,Bed/Bath upstairs,1/2 bath on main level  Home Access: Stairs to enter without rails (15+3)  Bathroom Shower/Tub: Walk-in shower (tub separate)  Bathroom Toilet: Handicap height  Home Equipment: janel Trivedi  Has the patient had two or more falls in the past year or any fall with injury in the past year?: Unknown  Receives Help From: Family  ADL Assistance: Independent  Ambulation Assistance: Independent  Transfer Assistance: Independent  Active : No  Occupation: Unemployed  Additional Comments: Pt is a questionable historian and paranoid at times.  Pt states she will go home at discharge not back to detox. Vision/Hearing  Vision  Vision: Impaired  Vision Exceptions: Wears glasses at all times  Hearing  Hearing: Within functional limits      Cognition   Orientation  Overall Orientation Status: Impaired  Orientation Level: Oriented to person;Oriented to place (oriented to date)  Cognition  Overall Cognitive Status: Exceptions  Arousal/Alertness: Appropriate responses to stimuli  Following Commands: Follows one step commands with increased time  Attention Span: Difficulty dividing attention  Memory: Decreased short term memory;Decreased long term memory  Safety Judgement: Decreased awareness of need for assistance  Problem Solving: Decreased awareness of errors;Assistance required to implement solutions  Insights: Decreased awareness of deficits     Objective                 AROM RLE (degrees)  RLE AROM: WFL  AROM LLE (degrees)  LLE AROM : WFL     Strength RLE  Strength RLE: WFL  Strength LLE  Strength LLE: WFL           Bed mobility  Supine to Sit: Independent  Sit to Supine: Independent  Scooting: Independent     Transfers  Sit to Stand: Supervision (x3 trials)  Stand to sit: Supervision (x3 trials)     Ambulation  Device: Rolling Walker  Assistance: Contact guard assistance (progressing to SBA)  Quality of Gait: slow magdiel with decreased JENNIFER; slightly ataxic steps with increased weight shift side to side; no overt LOB noted  Distance: 300 ft  Comments: Pt amb 10 ft without AD and min assist; pt unsteady and reaching out for B UE support.   Stairs/Curb  Stairs?:  (up/down 3 steps with B rails and CGA; pt educated to utilize step to gait pattern for increased safety and able to demo understanding but  not carryover.)                           AM-PAC Score  AM-PAC Inpatient Mobility Raw Score : 22 (07/11/22 1347)  AM-PAC Inpatient T-Scale Score : 53.28 (07/11/22 1347)  Mobility Inpatient CMS 0-100% Score: 20.91 (07/11/22 1347)  Mobility Inpatient CMS G-Code Modifier : General Eye (07/11/22 5591)       Education  Patient Education  Education Given To: Patient  Education Provided: Role of Therapy  Education Method: Verbal  Barriers to Learning: Cognition  Education Outcome: Verbalized understanding;Continued education needed      Therapy Time   Individual Concurrent Group Co-treatment   Time In 1300         Time Out 1338         Minutes 38           Timed Code Treatment Minutes:28       Total Treatment Minutes:  1463 Caridad Malave PT

## 2022-07-11 NOTE — PLAN OF CARE
Problem: Pain  Goal: Verbalizes/displays adequate comfort level or baseline comfort level  Outcome: Progressing  Note: Pt denies pain at this time. Problem: Skin/Tissue Integrity  Goal: Absence of new skin breakdown  Description: 1. Monitor for areas of redness and/or skin breakdown  2. Assess vascular access sites hourly  3. Every 4-6 hours minimum:  Change oxygen saturation probe site  4. Every 4-6 hours:  If on nasal continuous positive airway pressure, respiratory therapy assess nares and determine need for appliance change or resting period. Outcome: Progressing  Note: Pt at risk for skin breakdown. Skin assessment complete. No signs of skin breakdown. Pts skin cleansed with inc cleanser. Pt repositioned in bed with pillow support. Will continue to monitor. Problem: Safety - Adult  Goal: Free from fall injury  7/11/2022 1352 by Marisela Ramos RN  Outcome: Progressing  Note: Discussed with pt importance to keep bed low and locked with alarm activated, nonskid socks on when out of bed, call light and belongings within reach- will monitor.

## 2022-07-11 NOTE — PROGRESS NOTES
Occupational Therapy  Facility/Department: [address]  Occupational Therapy Initial Assessment    Name: Belen Tuttle  : 1960  MRN: [id number]  Date of Service: 2022    Discharge Recommendations: Belen Tuttle scored a 19/21 on the AM-PAC ADL Inpatient form. Current research shows that an AM-PAC score of 18 or greater is typically associated with a discharge to the patient's home setting. Based on the patient's AM-PAC score, and their current ADL deficits, it is recommended that the patient have 2-3 sessions per week of Occupational Therapy at d/c to increase the patient's independence. At this time, this patient demonstrates the endurance and safety to discharge home with 24hr assist from family and [address] [place], [place] [address]S [place] (home vs OP services) and a follow up treatment frequency of 2-3x/wk. Please see assessment section for further patient specific details. If patient discharges prior to next session this note will serve as a discharge summary. Please see below for the latest assessment towards goals. OT Equipment Recommendations  Equipment Needed: No       Patient Diagnosis(es): The primary encounter diagnosis was Alcohol withdrawal syndrome without complication ([facility] [address] A diagnosis of Hyponatremia was also pertinent to this visit. Past Medical History:  has a past medical history of C. difficile diarrhea, CAD (coronary artery disease), Cervical dystonia, CHF (congestive heart failure) ([address] Dental crown present, Fatty liver, Hyperlipidemia, Hypertension, Lichen sclerosus, and PONV (postoperative nausea and vomiting). Past Surgical History:  has a past surgical history that includes Breast surgery; Endoscopy, colon, diagnostic; Colonoscopy; Endometrial ablation; Colonoscopy (N/A, 2019); and Colonoscopy (2019).     Treatment Diagnosis: impaired fxl moblity      Assessment   Performance deficits / Impairments: Decreased functional mobility   Assessment: Pt is 62y F admitted from ETOH detox center - pt reports plan is to d/c to home where her  is home 24/7. Pt is IND at baseline, and presently SBA/supv for ADL and fxl mobilityj; demo increased stability/safety when using RW. Pt demonsrtates no needs requiring skilled OT while inpt, as she is currently demonsrtating ability to safely complete self-care and fxl mobiltiy tasks, increased time and decreased pace. Pt may benefit from [address] [place] [address] eval for household safety. Recommend 24hr A from family at d/c  Treatment Diagnosis: impaired fxl moblity  Prognosis: Good  Decision Making: Medium Complexity  No Skilled OT: Safe to return home  Activity Tolerance  Activity Tolerance: Patient Tolerated treatment well        Restrictions  Position Activity Restriction  Other position/activity restrictions: up with assist    Subjective   General  Chart Reviewed: Yes,Orders,Progress Notes  Family / Caregiver Present: No  Referring Practitioner: Trupti Armendariz MD  Diagnosis: hyponatremia  Subjective  Subjective: Pt semi-supine on OT/PT approahc and agreed to eval  General Comment  Comments: no pain     Social/Functional History  Social/Functional History  Lives With: Spouse  Type of Home: House  Home Layout: Multi-level,Bed/Bath upstairs,1/2 bath on main level  Home Access: Stairs to enter without rails (15+3)  Bathroom Shower/Tub: Walk-in shower (tub separate)  Bathroom Toilet: Handicap height  Home Equipment: [employer], rolling  Has the patient had two or more falls in the past year or any fall with injury in the past year?: Unknown  Receives Help From: Family  ADL Assistance: Independent  Ambulation Assistance: Independent  Transfer Assistance: Independent  Active : No  Occupation: Unemployed  Additional Comments: Pt is a questionable historian and paranoid at times. Pt states she will go home at discharge not back to detox.        Objective   Heart Rate: 57  Heart Rate Source: Monitor  BP: (!) 167/85  BP Location: Left upper arm  BP Method: Automatic  Patient Position: High fowlers  MAP (Calculated): 112.33  Resp: 17  SpO2: 96 %  O2 Device: None (Room air)             Safety Devices  Type of Devices: Left in chair;Chair alarm in place;Call light within reach;Nurse notified  Bed Mobility Training  Bed Mobility Training: Yes  Supine to Sit: Independent  Sit to Supine: Independent  Scooting: Independent  Transfer Training  Transfer Training: Yes  Overall Level of Assistance: Stand-by assistance  Sit to Stand: Stand-by assistance  Stand to Sit: Stand-by assistance  Toilet Transfer: Stand-by assistance  Gait  Overall Level of Assistance: Stand-by assistance;Contact-guard assistance (CGA progressing to SBA)  Distance (ft):  (household distance)  Assistive Device: Walker, rolling (Pt walked w/ and w/o RW- demo increased stability w/ RW, recommended for home use)        ADL  Feeding: Independent  Grooming: Supervision (SUPV in stance completing hand hygiene)  LE Dressing: Supervision (donned shoes EOB)  Toileting: Supervision     Activity Tolerance  Activity Tolerance: Treatment limited secondary to decreased cognition;Patient tolerated treatment well  Bed mobility  Supine to Sit: Independent  Sit to Supine: Independent        Cognition  Overall Cognitive Status: Exceptions  Arousal/Alertness: Appropriate responses to stimuli  Following Commands:  Follows one step commands with increased time  Attention Span: Difficulty dividing attention  Memory: Decreased short term memory;Decreased long term memory  Safety Judgement: Decreased awareness of need for assistance  Problem Solving: Decreased awareness of errors;Assistance required to implement solutions  Insights: Decreased awareness of deficits  Orientation  Overall Orientation Status: Impaired  Orientation Level: Oriented to person;Oriented to place (oriented to date)                  Education Given To: Patient  Education Provided: Role of Therapy;Plan of Care  Education Method: Demonstration  Education Outcome: Verbalized understanding;Continued education needed  LUE AROM (degrees)  LUE AROM : [facility]  RUE AROM (degrees)  RUE AROM : [facility]    AM-PAC Score        AM-PAC Inpatient Daily Activity Raw Score: 24 (07/11/22 1519)  AM-PAC Inpatient ADL T-Scale Score : 57.54 (07/11/22 1519)  ADL Inpatient CMS 0-100% Score: 0 (07/11/22 1519)  ADL Inpatient CMS G-Code Modifier : [address] (07/11/22 1519)    Therapy Time   Individual Concurrent Group Co-treatment   Time In 1300         Time Out 1338         Minutes 38         Timed Code Treatment Minutes: 23 Minutes+15min eval=38tot     Jerome Garcia, KATHERINE-OTR/L [id number]
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
## 2022-07-11 NOTE — PROGRESS NOTES
Patient c/o difficulties falling asleep. She reported not having a good night sleep since recent hospitalization. She reported taking Ambien 12.5 mg nightly at home since 2008. Sleep hygiene education was ineffective. MD notified via [employer].

## 2022-07-12 VITALS
DIASTOLIC BLOOD PRESSURE: 85 MMHG | SYSTOLIC BLOOD PRESSURE: 173 MMHG | WEIGHT: 199.3 LBS | TEMPERATURE: 98.2 F | RESPIRATION RATE: 16 BRPM | HEART RATE: 57 BPM | BODY MASS INDEX: 28.6 KG/M2 | OXYGEN SATURATION: 97 %

## 2022-07-12 LAB
A/G RATIO: 1.6 (ref 1.1–2.2)
ALBUMIN SERPL-MCNC: 4.1 G/DL (ref 3.4–5)
ALP BLD-CCNC: 72 U/L (ref 40–129)
ALT SERPL-CCNC: 63 U/L (ref 10–40)
ANION GAP SERPL CALCULATED.3IONS-SCNC: 9 MMOL/L (ref 3–16)
AST SERPL-CCNC: 46 U/L (ref 15–37)
BILIRUB SERPL-MCNC: 0.6 MG/DL (ref 0–1)
BUN BLDV-MCNC: 5 MG/DL (ref 7–20)
CALCIUM SERPL-MCNC: 10.1 MG/DL (ref 8.3–10.6)
CHLORIDE BLD-SCNC: 100 MMOL/L (ref 99–110)
CO2: 27 MMOL/L (ref 21–32)
CREAT SERPL-MCNC: <0.5 MG/DL (ref 0.6–1.2)
GFR AFRICAN AMERICAN: >60
GFR NON-AFRICAN AMERICAN: >60
GLUCOSE BLD-MCNC: 102 MG/DL (ref 70–99)
POTASSIUM REFLEX MAGNESIUM: 4.2 MMOL/L (ref 3.5–5.1)
SODIUM BLD-SCNC: 136 MMOL/L (ref 136–145)
TOTAL PROTEIN: 6.7 G/DL (ref 6.4–8.2)

## 2022-07-12 PROCEDURE — A4216 STERILE WATER/SALINE, 10 ML: HCPCS | Performed by: INTERNAL MEDICINE

## 2022-07-12 PROCEDURE — 80053 COMPREHEN METABOLIC PANEL: CPT

## 2022-07-12 PROCEDURE — 6370000000 HC RX 637 (ALT 250 FOR IP): Performed by: INTERNAL MEDICINE

## 2022-07-12 PROCEDURE — 2580000003 HC RX 258: Performed by: INTERNAL MEDICINE

## 2022-07-12 PROCEDURE — 2500000003 HC RX 250 WO HCPCS: Performed by: INTERNAL MEDICINE

## 2022-07-12 PROCEDURE — 36415 COLL VENOUS BLD VENIPUNCTURE: CPT

## 2022-07-12 RX ORDER — VALSARTAN 160 MG/1
160 TABLET ORAL DAILY
Status: DISCONTINUED | OUTPATIENT
Start: 2022-07-12 | End: 2022-07-12 | Stop reason: HOSPADM

## 2022-07-12 RX ADMIN — SODIUM CHLORIDE, PRESERVATIVE FREE 20 MG: 5 INJECTION INTRAVENOUS at 09:35

## 2022-07-12 RX ADMIN — SODIUM CHLORIDE, PRESERVATIVE FREE 10 ML: 5 INJECTION INTRAVENOUS at 09:42

## 2022-07-12 RX ADMIN — VALSARTAN 160 MG: 160 TABLET, FILM COATED ORAL at 11:16

## 2022-07-12 ASSESSMENT — PAIN SCALES - GENERAL: PAINLEVEL_OUTOF10: 0

## 2022-07-12 NOTE — PLAN OF CARE
Problem: Discharge Planning  Goal: Discharge to home or other facility with appropriate resources  Outcome: Adequate for Discharge     Problem: Pain  Goal: Verbalizes/displays adequate comfort level or baseline comfort level  Outcome: Adequate for Discharge     Problem: Skin/Tissue Integrity  Goal: Absence of new skin breakdown  Description: 1. Monitor for areas of redness and/or skin breakdown  2. Assess vascular access sites hourly  3. Every 4-6 hours minimum:  Change oxygen saturation probe site  4. Every 4-6 hours:  If on nasal continuous positive airway pressure, respiratory therapy assess nares and determine need for appliance change or resting period.   Outcome: Adequate for Discharge     Problem: Safety - Adult  Goal: Free from fall injury  Outcome: Adequate for Discharge     Problem: ABCDS Injury Assessment  Goal: Absence of physical injury  Outcome: Adequate for Discharge

## 2022-07-12 NOTE — DISCHARGE SUMMARY
Hospital Discharge Summary    Patient's PCP: Clau Hernandez  Admit Date: 7/7/2022   Discharge Date: 7/12/2022    Admitting Physician: Dr. Andry MD  Discharge Physician: Dr. Shanelle Sauceda MD   HPI: very pleasant 61 yo admitted with alcohol withdrawal syndrome  Brief hospital course:  Given the concern of the patients presentation and the concern of the possible multi-factorial etiology contributing to patients symptomatology. Patient was admitted and evaluated and found to have alcohol withdrawal syndrome and acute alcoholic hepatitis. The patient was found to be intravascularly volume depleted with numerous electrolyte abnormalities. She was given iv fluids and her condition improved, on day of discharge she was doing great, she was not having any issues. She is being discharged in stable condition case discussed with . She is asked to monitor for recurrence of symptoms or new symptoms including but not limited to chest pain shortness of breath nausea vomiting fevers or chills and seek immediate medical attention or call 911. Patient will be going to [place] [address] for treatment as well from here. Discharge Diagnoses:   Patient Active Problem List   Diagnosis    Alcohol abuse    Alcohol dependence with withdrawal ([facility] [address]    Alcohol withdrawal, uncomplicated (HCC)       Physical Exam: BP (!) 173/85   Pulse 57   Temp 98.2 °F (36.8 °C) (Oral)   Resp 16   Wt 199 lb 4.7 oz (90.4 kg)   SpO2 97%   BMI 28.60 kg/m²     No results for input(s): POCGLU in the last 72 hours.     BP (!) 173/85   Pulse 57   Temp 98.2 °F (36.8 °C) (Oral)   Resp 16   Wt 199 lb 4.7 oz (90.4 kg)   SpO2 97%   BMI 28.60 kg/m²   General appearance: alert, appears stated age and cooperative  Head: Normocephalic, without obvious abnormality, atraumatic  Neck: no adenopathy, no carotid bruit, no JVD, supple, symmetrical, trachea midline and thyroid not enlarged, symmetric, no tenderness/mass/nodules  Lungs: clear to auscultation bilaterally  Heart: regular rate and rhythm, S1, S2 normal, no murmur, click, rub or gallop  Abdomen: soft, non-tender; bowel sounds normal; no masses,  no organomegaly  Extremities: extremities normal, atraumatic, no cyanosis or edema  Pulses: 2+ and symmetric    LABS:  No results for input(s): WBC, HGB, PLT in the last 72 hours. Recent Labs     07/12/22  0820      K 4.2      CO2 27   BUN 5*   CREATININE <0.5*   GLUCOSE 102*     No results for input(s): INR in the last 72 hours.         Discharge Medications:     Medication List      CONTINUE taking these medications    Ambien CR 12.5 MG extended release tablet  Generic drug: zolpidem     aspirin EC 81 MG EC tablet  Take 1 tablet by mouth daily     lidocaine 4 % external patch  Place 1 patch onto the skin daily     LORazepam 1 MG tablet  Commonly known as: ATIVAN     magnesium citrate solution  Take 296 mLs by mouth ONCE PRN for Constipation     melatonin 5 MG Tbdp disintegrating tablet  Take 1 tablet by mouth nightly     multivitamin Tabs tablet  Take 1 tablet by mouth daily     pantoprazole sodium 40 MG Pack packet  Commonly known as: PROTONIX     polyethyl glycol-propyl glycol 0.4-0.3 % 0.4-0.3 % ophthalmic solution  Commonly known as: SYSTANE     sennosides-docusate sodium 8.6-50 MG tablet  Commonly known as: SENOKOT-S  Take 2 tablets by mouth in the morning and at bedtime     valsartan 160 MG tablet  Commonly known as: DIOVAN     vitamin B-1 100 MG tablet  Commonly known as: THIAMINE  Take 1 tablet by mouth daily        STOP taking these medications    dexlansoprazole 60 MG Cpdr delayed release capsule  Commonly known as: DEXILANT     irbesartan 300 MG tablet  Commonly known as: AVAPRO           Activity: activity as tolerated  Diet: regular diet  Wound Care: none needed    Disposition: home  Discharged Condition: Stable  Follow Up: Primary Care Physician in one week    Total time spent on discharge, finalizing medications, referrals and arranging outpatient follow up was more than 30 minutes    Thank you Dr. Hermila Donovan for the opportunity to be involved in this patients care. If you have any questions or concerns please feel free to contact me at [phone].

## 2022-07-12 NOTE — CARE COORDINATION
Case Management Assessment            Discharge Note                    Date / Time of Note: 7/12/2022 10:09 AM                  Discharge Note Completed by: MARIA C Mckeon, ENOCHW    Patient Name: Peggy So   YOB: 1960  Diagnosis: Hyponatremia [E87.1]  Alcohol withdrawal, uncomplicated ([facility] [address] [L21.135]  Alcohol withdrawal syndrome without complication ([facility] [address] [D90.940]   Date / Time: 7/7/2022  5:28 PM    Current PCP: Jasmyne Torres patient: No    Hospitalization in the last 30 days: No    Advance Directives:  Code Status: Full Code  [place] DNR form completed and on chart: No    Financial:  Payor: Kenney Verdin / Plan: [employer]  / Product Type: *No Product type* /      Pharmacy:    [id number], [facility] [address] [place] [address] [phone] - F [phone]  [address] [place] [address]  Phone: [phone] Fax: [phone]      Assistance purchasing medications?:    Assistance provided by Case Management: None at this time    Does patient want to participate in local refill/ meds to beds program?: Yes    Meds To Beds General Rules:  1. Can ONLY be done Monday- Friday between 8:30am-5pm  2. Prescription(s) must be in pharmacy by 3pm to be filled same day  3. Copy of patient's insurance/ prescription drug card and patient face sheet must be sent along with the prescription(s)  4. Cost of Rx cannot be added to hospital bill. If financial assistance is needed, please contact unit  or ;  or  CANNOT provide pharmacy voucher for patients co-pays  5.  Patients can then  the prescription on their way out of the hospital at discharge, or pharmacy can deliver to the bedside if staff is available. (payment due at time of pick-up or delivery - cash, check, or card accepted)     Able to afford home medications/ co-pay costs: Yes    ADLS:  Current PT AM-PAC Score: 22 /24  Current OT AM-PAC Score: 24 /24      DISCHARGE Disposition: Home- No Services Needed    LOC at discharge: Not Applicable  EMILEE Completed: Yes    Notification completed in HENS/PAS?:  Not Applicable    IMM Completed:   Not Indicated    Transportation:  Transportation PLAN for discharge: family   Mode of Transport: [address] ordered at discharge: No  [address]: Not Applicable  Orders faxed: No    Durable Medical Equipment:  DME Provider: None  Equipment obtained during hospitalization:     Home Oxygen and Respiratory Equipment:  Oxygen needed at discharge?: No  [address]: Not Applicable  Portable tank available for discharge?: Not Indicated    Dialysis:  Dialysis patient: No    Dialysis Center:  Not Applicable      Additional CM Notes: [facility] from [facility] [place], Pt could return for Detox, Pt has opted to return home at DC, no needs indicated, Pt's family will transport home. The Plan for Transition of Care is related to the following treatment goals of Hyponatremia [E87.1]  Alcohol withdrawal, uncomplicated ([facility] [address] [C10.970]  Alcohol withdrawal syndrome without complication ([facility] [address] [J16.334]    The Patient and/or patient representative Criselda Ravi and her family were provided with a choice of provider and agrees with the discharge plan Yes    Freedom of choice list was provided with basic dialogue that supports the patient's individualized plan of care/goals and shares the quality data associated with the providers.  Not Indicated    Care Transitions patient: No    MARIA C [place], [facility] [employer]  Case Management Department  Ph: [phone]  Fax: [phone]

## 2022-07-14 LAB — METHYLMALONIC ACID: 0.12 UMOL/L (ref 0–0.4)

## 2022-08-04 ENCOUNTER — APPOINTMENT (RX ONLY)
Dept: URBAN - METROPOLITAN AREA CLINIC 170 | Facility: CLINIC | Age: 62
Setting detail: DERMATOLOGY
End: 2022-08-04

## 2022-08-04 DIAGNOSIS — I83.9 ASYMPTOMATIC VARICOSE VEINS OF LOWER EXTREMITIES: ICD-10-CM

## 2022-08-04 DIAGNOSIS — L81.4 OTHER MELANIN HYPERPIGMENTATION: ICD-10-CM | Status: STABLE

## 2022-08-04 DIAGNOSIS — L57.0 ACTINIC KERATOSIS: ICD-10-CM

## 2022-08-04 DIAGNOSIS — Z85.828 PERSONAL HISTORY OF OTHER MALIGNANT NEOPLASM OF SKIN: ICD-10-CM

## 2022-08-04 DIAGNOSIS — B07.8 OTHER VIRAL WARTS: ICD-10-CM

## 2022-08-04 DIAGNOSIS — D18.0 HEMANGIOMA: ICD-10-CM | Status: STABLE

## 2022-08-04 DIAGNOSIS — L03.01 CELLULITIS OF FINGER: ICD-10-CM | Status: INADEQUATELY CONTROLLED

## 2022-08-04 DIAGNOSIS — L82.1 OTHER SEBORRHEIC KERATOSIS: ICD-10-CM | Status: STABLE

## 2022-08-04 DIAGNOSIS — D22 MELANOCYTIC NEVI: ICD-10-CM | Status: STABLE

## 2022-08-04 DIAGNOSIS — B37.2 CANDIDIASIS OF SKIN AND NAIL: ICD-10-CM | Status: INADEQUATELY CONTROLLED

## 2022-08-04 PROBLEM — D18.01 HEMANGIOMA OF SKIN AND SUBCUTANEOUS TISSUE: Status: ACTIVE | Noted: 2022-08-04

## 2022-08-04 PROBLEM — L03.019 CELLULITIS OF UNSPECIFIED FINGER: Status: ACTIVE | Noted: 2022-08-04

## 2022-08-04 PROBLEM — D22.5 MELANOCYTIC NEVI OF TRUNK: Status: ACTIVE | Noted: 2022-08-04

## 2022-08-04 PROBLEM — I83.92 ASYMPTOMATIC VARICOSE VEINS OF LEFT LOWER EXTREMITY: Status: ACTIVE | Noted: 2022-08-04

## 2022-08-04 PROCEDURE — ? COUNSELING

## 2022-08-04 PROCEDURE — ? PRESCRIPTION

## 2022-08-04 PROCEDURE — ? ADDITIONAL NOTES

## 2022-08-04 PROCEDURE — 99214 OFFICE O/P EST MOD 30 MIN: CPT

## 2022-08-04 PROCEDURE — ? FULL BODY SKIN EXAM

## 2022-08-04 PROCEDURE — ? DEFER

## 2022-08-04 PROCEDURE — ? TREATMENT REGIMEN

## 2022-08-04 RX ORDER — KETOCONAZOLE 20 MG/G
CREAM TOPICAL
Qty: 60 | Refills: 11 | Status: ERX | COMMUNITY
Start: 2022-08-04

## 2022-08-04 RX ORDER — TRIAMCINOLONE ACETONIDE 1 MG/G
OINTMENT TOPICAL
Qty: 80 | Refills: 1 | Status: ERX | COMMUNITY
Start: 2022-08-04

## 2022-08-04 RX ADMIN — TRIAMCINOLONE ACETONIDE: 1 OINTMENT TOPICAL at 00:00

## 2022-08-04 RX ADMIN — KETOCONAZOLE: 20 CREAM TOPICAL at 00:00

## 2022-08-04 ASSESSMENT — LOCATION SIMPLE DESCRIPTION DERM
LOCATION SIMPLE: ABDOMEN
LOCATION SIMPLE: NOSE
LOCATION SIMPLE: RIGHT THUMB
LOCATION SIMPLE: RIGHT UPPER BACK
LOCATION SIMPLE: UPPER BACK
LOCATION SIMPLE: LEFT PRETIBIAL REGION

## 2022-08-04 ASSESSMENT — LOCATION DETAILED DESCRIPTION DERM
LOCATION DETAILED: INFERIOR THORACIC SPINE
LOCATION DETAILED: LEFT PROXIMAL PRETIBIAL REGION
LOCATION DETAILED: NASAL SUPRATIP
LOCATION DETAILED: SUBXIPHOID
LOCATION DETAILED: RIGHT DISTAL RADIAL THUMB
LOCATION DETAILED: RIGHT MID-UPPER BACK
LOCATION DETAILED: RIGHT LATERAL ABDOMEN
LOCATION DETAILED: EPIGASTRIC SKIN

## 2022-08-04 ASSESSMENT — LOCATION ZONE DERM
LOCATION ZONE: NOSE
LOCATION ZONE: FINGER
LOCATION ZONE: TRUNK
LOCATION ZONE: LEG

## 2022-08-04 NOTE — PROCEDURE: COUNSELING
Detail Level: Detailed
Detail Level: Generalized
Detail Level: Zone
Detail Level: Simple
Patient Specific Counseling (Will Not Stick From Patient To Patient): Instructed patient able to get nails done in 1-2 weeks

## 2022-08-04 NOTE — PROCEDURE: DEFER
Size Of Lesion In Cm (Optional): 0
Introduction Text (Please End With A Colon): The following procedure is being deferred:
Detail Level: Detailed

## 2022-08-04 NOTE — HPI: EVALUATION OF SKIN LESION(S)
What Type Of Note Output Would You Prefer (Optional)?: Bullet Format
Hpi Title: Evaluation of Skin Lesions
How Severe Are Your Spot(S)?: mild
Have Your Spot(S) Been Treated In The Past?: has not been treated
Additional History: Check spot on nose and chest

## 2022-08-04 NOTE — HPI: NAIL DYSTROPHY
How Severe Is It?: mild
Is This A New Presentation, Or A Follow-Up?: Nail Dystrophy
Additional History: Patient thinks it’s infected again which 20 yrs ago

## 2022-10-27 ENCOUNTER — HOSPITAL ENCOUNTER (OUTPATIENT)
Dept: WOMENS IMAGING | Age: 62
Discharge: HOME | End: 2022-10-27
Payer: COMMERCIAL

## 2022-10-27 VITALS — WEIGHT: 199 LBS | HEIGHT: 70 IN | BODY MASS INDEX: 28.49 KG/M2

## 2022-10-27 DIAGNOSIS — Z12.31 BREAST CANCER SCREENING BY MAMMOGRAM: ICD-10-CM

## 2022-10-27 PROCEDURE — 77067 SCR MAMMO BI INCL CAD: CPT

## 2022-11-26 ENCOUNTER — HOSPITAL ENCOUNTER (INPATIENT)
Age: 62
LOS: 4 days | Discharge: HOME HEALTH | DRG: 641 | End: 2022-11-30
Attending: EMERGENCY MEDICINE | Admitting: HOSPITALIST
Payer: COMMERCIAL

## 2022-11-26 ENCOUNTER — APPOINTMENT (OUTPATIENT)
Dept: GENERAL RADIOLOGY | Age: 62
DRG: 641 | End: 2022-11-26
Payer: COMMERCIAL

## 2022-11-26 ENCOUNTER — APPOINTMENT (OUTPATIENT)
Dept: CT IMAGING | Age: 62
DRG: 641 | End: 2022-11-26
Payer: COMMERCIAL

## 2022-11-26 DIAGNOSIS — E87.1 HYPONATREMIA: Primary | ICD-10-CM

## 2022-11-26 DIAGNOSIS — F10.939 ALCOHOL WITHDRAWAL SYNDROME WITH COMPLICATION (HCC): ICD-10-CM

## 2022-11-26 LAB
A/G RATIO: 1.2 (ref 1.1–2.2)
ALBUMIN SERPL-MCNC: 4.3 G/DL (ref 3.4–5)
ALBUMIN SERPL-MCNC: 4.5 G/DL (ref 3.4–5)
ALP BLD-CCNC: 106 U/L (ref 40–129)
ALT SERPL-CCNC: 51 U/L (ref 10–40)
AMORPHOUS: ABNORMAL /HPF
ANION GAP SERPL CALCULATED.3IONS-SCNC: 17 MMOL/L (ref 3–16)
ANION GAP SERPL CALCULATED.3IONS-SCNC: 19 MMOL/L (ref 3–16)
ANION GAP SERPL CALCULATED.3IONS-SCNC: 25 MMOL/L (ref 3–16)
AST SERPL-CCNC: 105 U/L (ref 15–37)
BACTERIA: ABNORMAL /HPF
BASE EXCESS VENOUS: -0.2 MMOL/L (ref -2–3)
BASOPHILS ABSOLUTE: 0 K/UL (ref 0–0.2)
BASOPHILS RELATIVE PERCENT: 0.5 %
BILIRUB SERPL-MCNC: 1.8 MG/DL (ref 0–1)
BILIRUBIN URINE: ABNORMAL
BLOOD, URINE: NEGATIVE
BUN BLDV-MCNC: 10 MG/DL (ref 7–20)
CALCIUM SERPL-MCNC: 10.3 MG/DL (ref 8.3–10.6)
CALCIUM SERPL-MCNC: 9.9 MG/DL (ref 8.3–10.6)
CALCIUM SERPL-MCNC: 9.9 MG/DL (ref 8.3–10.6)
CARBOXYHEMOGLOBIN: 1.4 % (ref 0–1.5)
CHLORIDE BLD-SCNC: 74 MMOL/L (ref 99–110)
CHLORIDE BLD-SCNC: 76 MMOL/L (ref 99–110)
CHLORIDE BLD-SCNC: 76 MMOL/L (ref 99–110)
CLARITY: CLEAR
CO2: 17 MMOL/L (ref 21–32)
CO2: 21 MMOL/L (ref 21–32)
CO2: 22 MMOL/L (ref 21–32)
COLOR: YELLOW
CREAT SERPL-MCNC: 0.5 MG/DL (ref 0.6–1.2)
CREAT SERPL-MCNC: 0.6 MG/DL (ref 0.6–1.2)
CREAT SERPL-MCNC: <0.5 MG/DL (ref 0.6–1.2)
EOSINOPHILS ABSOLUTE: 0 K/UL (ref 0–0.6)
EOSINOPHILS RELATIVE PERCENT: 0 %
EPITHELIAL CELLS, UA: ABNORMAL /HPF (ref 0–5)
ETHANOL: 29 MG/DL (ref 0–0.08)
GFR SERPL CREATININE-BSD FRML MDRD: >60 ML/MIN/{1.73_M2}
GLUCOSE BLD-MCNC: 109 MG/DL (ref 70–99)
GLUCOSE BLD-MCNC: 109 MG/DL (ref 70–99)
GLUCOSE BLD-MCNC: 112 MG/DL (ref 70–99)
GLUCOSE URINE: 100 MG/DL
HCO3 VENOUS: 22.5 MMOL/L (ref 24–28)
HCT VFR BLD CALC: 39.8 % (ref 36–48)
HEMOGLOBIN, VEN, REDUCED: 14.4 %
HEMOGLOBIN: 13.9 G/DL (ref 12–16)
HYALINE CASTS: ABNORMAL /LPF (ref 0–2)
INR BLD: 1.03 (ref 0.87–1.14)
KETONES, URINE: >=80 MG/DL
LACTIC ACID: 1.9 MMOL/L (ref 0.4–2)
LACTIC ACID: 5.1 MMOL/L (ref 0.4–2)
LEUKOCYTE ESTERASE, URINE: NEGATIVE
LYMPHOCYTES ABSOLUTE: 0.5 K/UL (ref 1–5.1)
LYMPHOCYTES RELATIVE PERCENT: 8.8 %
MCH RBC QN AUTO: 32.3 PG (ref 26–34)
MCHC RBC AUTO-ENTMCNC: 34.9 G/DL (ref 31–36)
MCV RBC AUTO: 92.5 FL (ref 80–100)
METHEMOGLOBIN VENOUS: 0.2 % (ref 0–1.5)
MICROSCOPIC EXAMINATION: YES
MONOCYTES ABSOLUTE: 0.5 K/UL (ref 0–1.3)
MONOCYTES RELATIVE PERCENT: 9.6 %
MUCUS: ABNORMAL /LPF
NEUTROPHILS ABSOLUTE: 4.1 K/UL (ref 1.7–7.7)
NEUTROPHILS RELATIVE PERCENT: 81.1 %
NITRITE, URINE: NEGATIVE
O2 SAT, VEN: 85 %
OSMOLALITY URINE: 841 MOSM/KG (ref 390–1070)
OSMOLALITY: 252 MOSM/KG (ref 278–305)
PCO2, VEN: 31.1 MMHG (ref 41–51)
PDW BLD-RTO: 16.9 % (ref 12.4–15.4)
PH UA: 6 (ref 5–8)
PH VENOUS: 7.47 (ref 7.35–7.45)
PHOSPHORUS: 1.9 MG/DL (ref 2.5–4.9)
PLATELET # BLD: 113 K/UL (ref 135–450)
PMV BLD AUTO: 8.4 FL (ref 5–10.5)
PO2, VEN: 50.7 MMHG (ref 25–40)
POTASSIUM REFLEX MAGNESIUM: 4.4 MMOL/L (ref 3.5–5.1)
POTASSIUM REFLEX MAGNESIUM: 4.6 MMOL/L (ref 3.5–5.1)
POTASSIUM SERPL-SCNC: 4.8 MMOL/L (ref 3.5–5.1)
PROTEIN UA: 30 MG/DL
PROTHROMBIN TIME: 13.4 SEC (ref 11.7–14.5)
RBC # BLD: 4.3 M/UL (ref 4–5.2)
RBC UA: ABNORMAL /HPF (ref 0–4)
RENAL EPITHELIAL, UA: ABNORMAL /HPF (ref 0–1)
SODIUM BLD-SCNC: 115 MMOL/L (ref 136–145)
SODIUM BLD-SCNC: 116 MMOL/L (ref 136–145)
SODIUM BLD-SCNC: 118 MMOL/L (ref 136–145)
SPECIFIC GRAVITY UA: 1.02 (ref 1–1.03)
T4 FREE: 1 NG/DL (ref 0.9–1.8)
TCO2 CALC VENOUS: 24 MMOL/L
TOTAL CK: 177 U/L (ref 26–192)
TOTAL PROTEIN: 8.2 G/DL (ref 6.4–8.2)
TSH SERPL DL<=0.05 MIU/L-ACNC: 1.22 UIU/ML (ref 0.27–4.2)
URINE REFLEX TO CULTURE: YES
URINE TYPE: ABNORMAL
UROBILINOGEN, URINE: 2 E.U./DL
WBC # BLD: 5.1 K/UL (ref 4–11)
WBC UA: ABNORMAL /HPF (ref 0–5)

## 2022-11-26 PROCEDURE — 6370000000 HC RX 637 (ALT 250 FOR IP): Performed by: HOSPITALIST

## 2022-11-26 PROCEDURE — 83605 ASSAY OF LACTIC ACID: CPT

## 2022-11-26 PROCEDURE — 6370000000 HC RX 637 (ALT 250 FOR IP): Performed by: STUDENT IN AN ORGANIZED HEALTH CARE EDUCATION/TRAINING PROGRAM

## 2022-11-26 PROCEDURE — 70450 CT HEAD/BRAIN W/O DYE: CPT

## 2022-11-26 PROCEDURE — 6360000002 HC RX W HCPCS

## 2022-11-26 PROCEDURE — 82550 ASSAY OF CK (CPK): CPT

## 2022-11-26 PROCEDURE — 6370000000 HC RX 637 (ALT 250 FOR IP)

## 2022-11-26 PROCEDURE — 6360000002 HC RX W HCPCS: Performed by: HOSPITALIST

## 2022-11-26 PROCEDURE — 96374 THER/PROPH/DIAG INJ IV PUSH: CPT

## 2022-11-26 PROCEDURE — 84439 ASSAY OF FREE THYROXINE: CPT

## 2022-11-26 PROCEDURE — 96361 HYDRATE IV INFUSION ADD-ON: CPT

## 2022-11-26 PROCEDURE — 84443 ASSAY THYROID STIM HORMONE: CPT

## 2022-11-26 PROCEDURE — 99285 EMERGENCY DEPT VISIT HI MDM: CPT

## 2022-11-26 PROCEDURE — 85610 PROTHROMBIN TIME: CPT

## 2022-11-26 PROCEDURE — 85025 COMPLETE CBC W/AUTO DIFF WBC: CPT

## 2022-11-26 PROCEDURE — 2580000003 HC RX 258: Performed by: STUDENT IN AN ORGANIZED HEALTH CARE EDUCATION/TRAINING PROGRAM

## 2022-11-26 PROCEDURE — 87040 BLOOD CULTURE FOR BACTERIA: CPT

## 2022-11-26 PROCEDURE — 82803 BLOOD GASES ANY COMBINATION: CPT

## 2022-11-26 PROCEDURE — 2580000003 HC RX 258: Performed by: INTERNAL MEDICINE

## 2022-11-26 PROCEDURE — 82077 ASSAY SPEC XCP UR&BREATH IA: CPT

## 2022-11-26 PROCEDURE — 36415 COLL VENOUS BLD VENIPUNCTURE: CPT

## 2022-11-26 PROCEDURE — 87086 URINE CULTURE/COLONY COUNT: CPT

## 2022-11-26 PROCEDURE — 6360000002 HC RX W HCPCS: Performed by: STUDENT IN AN ORGANIZED HEALTH CARE EDUCATION/TRAINING PROGRAM

## 2022-11-26 PROCEDURE — 2000000000 HC ICU R&B

## 2022-11-26 PROCEDURE — 2580000003 HC RX 258: Performed by: HOSPITALIST

## 2022-11-26 PROCEDURE — 99223 1ST HOSP IP/OBS HIGH 75: CPT | Performed by: INTERNAL MEDICINE

## 2022-11-26 PROCEDURE — 84295 ASSAY OF SERUM SODIUM: CPT

## 2022-11-26 PROCEDURE — 81001 URINALYSIS AUTO W/SCOPE: CPT

## 2022-11-26 PROCEDURE — 93005 ELECTROCARDIOGRAM TRACING: CPT | Performed by: STUDENT IN AN ORGANIZED HEALTH CARE EDUCATION/TRAINING PROGRAM

## 2022-11-26 PROCEDURE — 6370000000 HC RX 637 (ALT 250 FOR IP): Performed by: INTERNAL MEDICINE

## 2022-11-26 PROCEDURE — 2500000003 HC RX 250 WO HCPCS: Performed by: HOSPITALIST

## 2022-11-26 PROCEDURE — 83930 ASSAY OF BLOOD OSMOLALITY: CPT

## 2022-11-26 PROCEDURE — 83935 ASSAY OF URINE OSMOLALITY: CPT

## 2022-11-26 PROCEDURE — 80053 COMPREHEN METABOLIC PANEL: CPT

## 2022-11-26 PROCEDURE — 71045 X-RAY EXAM CHEST 1 VIEW: CPT

## 2022-11-26 RX ORDER — 0.9 % SODIUM CHLORIDE 0.9 %
500 INTRAVENOUS SOLUTION INTRAVENOUS ONCE
Status: COMPLETED | OUTPATIENT
Start: 2022-11-26 | End: 2022-11-26

## 2022-11-26 RX ORDER — LORAZEPAM 2 MG/ML
3 CONCENTRATE ORAL
Status: DISCONTINUED | OUTPATIENT
Start: 2022-11-26 | End: 2022-11-30 | Stop reason: HOSPADM

## 2022-11-26 RX ORDER — LORAZEPAM 1 MG/1
3 TABLET ORAL
Status: DISCONTINUED | OUTPATIENT
Start: 2022-11-26 | End: 2022-11-30 | Stop reason: HOSPADM

## 2022-11-26 RX ORDER — DEXTROSE, SODIUM CHLORIDE, SODIUM LACTATE, POTASSIUM CHLORIDE, AND CALCIUM CHLORIDE 5; .6; .31; .03; .02 G/100ML; G/100ML; G/100ML; G/100ML; G/100ML
1000 INJECTION, SOLUTION INTRAVENOUS CONTINUOUS
Status: DISCONTINUED | OUTPATIENT
Start: 2022-11-26 | End: 2022-11-26

## 2022-11-26 RX ORDER — ONDANSETRON 4 MG/1
4 TABLET, ORALLY DISINTEGRATING ORAL EVERY 8 HOURS PRN
Status: DISCONTINUED | OUTPATIENT
Start: 2022-11-26 | End: 2022-11-30 | Stop reason: HOSPADM

## 2022-11-26 RX ORDER — LORAZEPAM 2 MG/ML
4 CONCENTRATE ORAL
Status: DISCONTINUED | OUTPATIENT
Start: 2022-11-26 | End: 2022-11-30 | Stop reason: HOSPADM

## 2022-11-26 RX ORDER — THIAMINE HYDROCHLORIDE 100 MG/ML
100 INJECTION, SOLUTION INTRAMUSCULAR; INTRAVENOUS ONCE
Status: COMPLETED | OUTPATIENT
Start: 2022-11-26 | End: 2022-11-26

## 2022-11-26 RX ORDER — PANTOPRAZOLE SODIUM 40 MG/1
40 TABLET, DELAYED RELEASE ORAL
Status: DISCONTINUED | OUTPATIENT
Start: 2022-11-27 | End: 2022-11-30 | Stop reason: HOSPADM

## 2022-11-26 RX ORDER — ONDANSETRON 2 MG/ML
INJECTION INTRAMUSCULAR; INTRAVENOUS
Status: COMPLETED
Start: 2022-11-26 | End: 2022-11-26

## 2022-11-26 RX ORDER — LORAZEPAM 1 MG/1
1 TABLET ORAL
Status: DISCONTINUED | OUTPATIENT
Start: 2022-11-26 | End: 2022-11-30 | Stop reason: HOSPADM

## 2022-11-26 RX ORDER — GAUZE BANDAGE 2" X 2"
100 BANDAGE TOPICAL DAILY
Status: DISCONTINUED | OUTPATIENT
Start: 2022-11-26 | End: 2022-11-30 | Stop reason: HOSPADM

## 2022-11-26 RX ORDER — SODIUM CHLORIDE 0.9 % (FLUSH) 0.9 %
10 SYRINGE (ML) INJECTION EVERY 12 HOURS SCHEDULED
Status: DISCONTINUED | OUTPATIENT
Start: 2022-11-26 | End: 2022-11-30 | Stop reason: HOSPADM

## 2022-11-26 RX ORDER — ENOXAPARIN SODIUM 100 MG/ML
40 INJECTION SUBCUTANEOUS DAILY
Status: DISCONTINUED | OUTPATIENT
Start: 2022-11-26 | End: 2022-11-30 | Stop reason: HOSPADM

## 2022-11-26 RX ORDER — LORAZEPAM 1 MG/1
4 TABLET ORAL
Status: DISCONTINUED | OUTPATIENT
Start: 2022-11-26 | End: 2022-11-30 | Stop reason: HOSPADM

## 2022-11-26 RX ORDER — VALSARTAN 80 MG/1
160 TABLET ORAL DAILY
Status: DISCONTINUED | OUTPATIENT
Start: 2022-11-26 | End: 2022-11-30 | Stop reason: HOSPADM

## 2022-11-26 RX ORDER — SODIUM CHLORIDE 9 MG/ML
INJECTION, SOLUTION INTRAVENOUS PRN
Status: DISCONTINUED | OUTPATIENT
Start: 2022-11-26 | End: 2022-11-30 | Stop reason: HOSPADM

## 2022-11-26 RX ORDER — LORAZEPAM 2 MG/ML
2 CONCENTRATE ORAL
Status: DISCONTINUED | OUTPATIENT
Start: 2022-11-26 | End: 2022-11-30 | Stop reason: HOSPADM

## 2022-11-26 RX ORDER — ZOLPIDEM TARTRATE 5 MG/1
10 TABLET ORAL NIGHTLY PRN
Status: DISCONTINUED | OUTPATIENT
Start: 2022-11-26 | End: 2022-11-30 | Stop reason: HOSPADM

## 2022-11-26 RX ORDER — LORAZEPAM 2 MG/ML
1 CONCENTRATE ORAL
Status: DISCONTINUED | OUTPATIENT
Start: 2022-11-26 | End: 2022-11-30 | Stop reason: HOSPADM

## 2022-11-26 RX ORDER — ASPIRIN 81 MG/1
81 TABLET ORAL DAILY
Status: DISCONTINUED | OUTPATIENT
Start: 2022-11-26 | End: 2022-11-30 | Stop reason: HOSPADM

## 2022-11-26 RX ORDER — DEXTROSE, SODIUM CHLORIDE, SODIUM LACTATE, POTASSIUM CHLORIDE, AND CALCIUM CHLORIDE 5; .6; .31; .03; .02 G/100ML; G/100ML; G/100ML; G/100ML; G/100ML
1000 INJECTION, SOLUTION INTRAVENOUS CONTINUOUS
Status: DISCONTINUED | OUTPATIENT
Start: 2022-11-26 | End: 2022-11-27

## 2022-11-26 RX ORDER — ONDANSETRON 2 MG/ML
4 INJECTION INTRAMUSCULAR; INTRAVENOUS EVERY 6 HOURS PRN
Status: DISCONTINUED | OUTPATIENT
Start: 2022-11-26 | End: 2022-11-30 | Stop reason: HOSPADM

## 2022-11-26 RX ORDER — SODIUM CHLORIDE 0.9 % (FLUSH) 0.9 %
10 SYRINGE (ML) INJECTION PRN
Status: DISCONTINUED | OUTPATIENT
Start: 2022-11-26 | End: 2022-11-30 | Stop reason: HOSPADM

## 2022-11-26 RX ORDER — LORAZEPAM 1 MG/1
2 TABLET ORAL
Status: DISCONTINUED | OUTPATIENT
Start: 2022-11-26 | End: 2022-11-30 | Stop reason: HOSPADM

## 2022-11-26 RX ORDER — ONDANSETRON 2 MG/ML
4 INJECTION INTRAMUSCULAR; INTRAVENOUS ONCE
Status: COMPLETED | OUTPATIENT
Start: 2022-11-26 | End: 2022-11-26

## 2022-11-26 RX ORDER — DEXLANSOPRAZOLE 60 MG/1
60 CAPSULE, DELAYED RELEASE ORAL DAILY
COMMUNITY

## 2022-11-26 RX ADMIN — Medication 30 G: at 18:52

## 2022-11-26 RX ADMIN — SODIUM CHLORIDE, SODIUM LACTATE, POTASSIUM CHLORIDE, CALCIUM CHLORIDE AND DEXTROSE MONOHYDRATE 1000 ML: 5; 600; 310; 30; 20 INJECTION, SOLUTION INTRAVENOUS at 11:16

## 2022-11-26 RX ADMIN — LORAZEPAM 1 MG: 1 TABLET ORAL at 14:12

## 2022-11-26 RX ADMIN — THIAMINE HCL TAB 100 MG 100 MG: 100 TAB at 22:00

## 2022-11-26 RX ADMIN — ONDANSETRON 4 MG: 2 INJECTION INTRAMUSCULAR; INTRAVENOUS at 12:39

## 2022-11-26 RX ADMIN — SODIUM CHLORIDE 500 ML: 900 INJECTION, SOLUTION INTRAVENOUS at 17:49

## 2022-11-26 RX ADMIN — ASCORBIC ACID, VITAMIN A PALMITATE, CHOLECALCIFEROL, THIAMINE HYDROCHLORIDE, RIBOFLAVIN-5 PHOSPHATE SODIUM, PYRIDOXINE HYDROCHLORIDE, NIACINAMIDE, DEXPANTHENOL, ALPHA-TOCOPHEROL ACETATE, VITAMIN K1, FOLIC ACID, BIOTIN, CYANOCOBALAMIN: 200; 3300; 200; 6; 3.6; 6; 40; 15; 10; 150; 600; 60; 5 INJECTION, SOLUTION INTRAVENOUS at 18:59

## 2022-11-26 RX ADMIN — LORAZEPAM 2 MG: 1 TABLET ORAL at 18:18

## 2022-11-26 RX ADMIN — LORAZEPAM 2 MG: 1 TABLET ORAL at 11:16

## 2022-11-26 RX ADMIN — THIAMINE HYDROCHLORIDE 100 MG: 100 INJECTION, SOLUTION INTRAMUSCULAR; INTRAVENOUS at 12:39

## 2022-11-26 RX ADMIN — SODIUM CHLORIDE, SODIUM LACTATE, POTASSIUM CHLORIDE, CALCIUM CHLORIDE AND DEXTROSE MONOHYDRATE 1000 ML: 5; 600; 310; 30; 20 INJECTION, SOLUTION INTRAVENOUS at 13:44

## 2022-11-26 RX ADMIN — LORAZEPAM 2 MG: 1 TABLET ORAL at 21:59

## 2022-11-26 RX ADMIN — LORAZEPAM 1 MG: 1 TABLET ORAL at 16:03

## 2022-11-26 RX ADMIN — SODIUM CHLORIDE, SODIUM LACTATE, POTASSIUM CHLORIDE, CALCIUM CHLORIDE AND DEXTROSE MONOHYDRATE 1000 ML: 5; 600; 310; 30; 20 INJECTION, SOLUTION INTRAVENOUS at 17:10

## 2022-11-26 RX ADMIN — ONDANSETRON 4 MG: 2 INJECTION INTRAMUSCULAR; INTRAVENOUS at 21:59

## 2022-11-26 RX ADMIN — ZOLPIDEM TARTRATE 10 MG: 5 TABLET ORAL at 22:00

## 2022-11-26 RX ADMIN — ENOXAPARIN SODIUM 40 MG: 100 INJECTION SUBCUTANEOUS at 17:53

## 2022-11-26 ASSESSMENT — LIFESTYLE VARIABLES
HOW MANY STANDARD DRINKS CONTAINING ALCOHOL DO YOU HAVE ON A TYPICAL DAY: 3 OR 4
HOW OFTEN DO YOU HAVE A DRINK CONTAINING ALCOHOL: 4 OR MORE TIMES A WEEK

## 2022-11-26 ASSESSMENT — PAIN DESCRIPTION - LOCATION: LOCATION: ABDOMEN

## 2022-11-26 ASSESSMENT — PAIN DESCRIPTION - DESCRIPTORS: DESCRIPTORS: ACHING

## 2022-11-26 ASSESSMENT — PAIN - FUNCTIONAL ASSESSMENT: PAIN_FUNCTIONAL_ASSESSMENT: 0-10

## 2022-11-26 ASSESSMENT — PAIN SCALES - GENERAL: PAINLEVEL_OUTOF10: 4

## 2022-11-26 NOTE — H&P
History and Physical      Name:  Viji Champion /Age/Sex: 1960  (60 y.o. female)   MRN & CSN:  [id number] & [id number] Admission Date/Time: 2022 10:16 AM   [facility] PCP: [address]: 1    Assessment and Plan:   Viji Champion is a 60 y.o.  female  who presents with Hyponatremia    Severe hyponatremia with a serum sodium 116:  Could be due to beer potomania? Check urinary study urine sodium urine osmolarity and serum osmolarity  Start on IV fluid and monitor serum sodium every 4 hour  Nephrologist on board management as per nephrology  CT head nonacute    2-alcohol withdrawal:  Continue on CIWA protocol  Started on banana bag  Thiamine and folic acid  after that  She may need Precedex drip at certain point because of the severe withdrawal symptom and the last drink of alcohol was last night  Consult critical care  ICU placement  Patient report bilateral leg weakness left more than the right: Could be due to severe alcohol congestion? Check CK to rule out rhabdomyolysis  CT head nonacute consult neurology and may need to do MRI of the head to rule out stroke  PT OT evaluation and swallow evaluation  Continue on aspirin  DVT and GI prophylaxis  Check and replace serum electrolytes    3-severe lactic acidosis with a lactic acid 5.1: Could be due to alcohol ingestion?   Continue IV fluid and trend lactic acid  ABG showed pH 7.46 and PCO2 31 indicating metabolic acidosis  Serum bicarbonate 17  Urinalysis is nitrite positive start on IV Rocephin empirically and follow urine culture  Check blood culture    4-history of hypertension continue on home blood pressure medication  Diet No diet orders on file   DVT Prophylaxis [x] Lovenox, []  Heparin, [] SCDs, [] Ambulation   GI Prophylaxis [x] PPI,  [] H2 Blocker,  [] Carafate,  [] Diet/Tube Feeds   Code Status Prior   Disposition Patient requires continued admission due to    MDM [] Low, [] Moderate,[] High  Patient's risk as above due to      History of Present Illness:     Chief Complaint: Hyponatremia  Christie Negro is a 60 y.o.  female  who presents with     59 yo F with past medical history of cervical dystonia on Ativan at home, history of alcohol abuse, presented to the emergency today because she has bilateral leg weakness and unable to walk properly the last 3 days, she drinks heavily alcohol every night , her  stated that during the last 2 weeks he has to help her walking and she had multiple fall, was walking without difficulty before that, patient also complaining of hand tremor and shakiness, denies chest pain or shortness of breath, denies diarrhea nausea vomiting, was found to have severe hyponatremia with a serum sodium 116, and lactic acidosis with lactic acid 5.1, patient denies urinary symptom however her urinalysis is nitrite positive and could be UTI. I did consult nephrologist for severe hyponatremia and consult critical care to admit the patient to ICU because of the severe hyponatremia and severe alcohol withdrawal symptom. Ten point ROS reviewed negative, unless as noted above    Objective:   No intake or output data in the 24 hours ending 11/26/22 1336   Vitals:   Vitals:    11/26/22 1300   BP: (!) 144/76   Pulse: (!) 103   Resp: 20   Temp:    SpO2: 94%     Physical Exam:   GEN Awake.  Alert , not in respiratory distress, not in pain, bilateral hands tremor ,   HEENT: PEERLA, , supple neck,   Chest: air entry equal bilaterally, no wheezing or crepitation  Heart: S1 and S2 heard, no murmur, no gallop or rub, regular rate  Abdomen: soft, ND , Nt, +BS  Extremities: no cyanosis, tenderness or erythema, peripheral pulses audible  Neurology: alert, oriented x3, able to move 4 limbs    Past Medical History:      Past Medical History:   Diagnosis Date    C. difficile diarrhea 05/15/2021    CAD (coronary artery disease)     Cervical dystonia     CHF (congestive heart failure) [facility])     Dental crown present     upper right and bonding    Fatty liver     Hyperlipidemia     Hypertension     Lichen sclerosus     PONV (postoperative nausea and vomiting)      PSHX:  has a past surgical history that includes Breast surgery; Endoscopy, colon, diagnostic; Colonoscopy; Endometrial ablation; Colonoscopy (N/A, 7/2/2019); and Colonoscopy (7/2/2019). Allergies: Allergies   Allergen Reactions    Atorvastatin     Iodine Swelling    Pravastatin     Rosuvastatin     Shellfish Allergy Itching    Sulfa Antibiotics Nausea Only    Tylenol [Acetaminophen]      Fatty liver    Lunesta [Eszopiclone] Nausea And Vomiting       FAM HX: family history includes Breast Cancer (age of onset: 76) in her mother. Soc HX:   Social History     Socioeconomic History    Marital status:      Spouse name: None    Number of children: None    Years of education: None    Highest education level: None   Tobacco Use    Smoking status: Never    Smokeless tobacco: Never   Vaping Use    Vaping Use: Never used   Substance and Sexual Activity    Alcohol use: Yes     Alcohol/week: 3.0 standard drinks     Types: 3 Shots of liquor per week     Comment: 2 shots of bourbon every 3 hours.  about a bottle bourbon a day    Drug use: Never       Medications:   Medications:    Infusions:    dextrose 5% in lactated ringers       PRN Meds: LORazepam, 1 mg, Q1H PRN   Or  LORazepam, 1 mg, Q1H PRN   Or  LORazepam, 2 mg, Q1H PRN   Or  LORazepam, 2 mg, Q1H PRN   Or  LORazepam, 3 mg, Q1H PRN   Or  LORazepam, 3 mg, Q1H PRN   Or  LORazepam, 4 mg, Q1H PRN   Or  LORazepam, 4 mg, Q1H PRN          Electronically signed by Ash Florentino MD on 11/26/2022 at 1:36 PM

## 2022-11-26 NOTE — ED PROVIDER NOTES
ED Attending Attestation Note     Date of evaluation: 11/26/2022    This patient was seen by the resident. I have seen and examined the patient, agree with the workup, evaluation, management and diagnosis. The care plan has been discussed. My assessment reveals patient with tremulousness, gait trouble, and anxiety in setting of alcohol use disorder. Patient is in early stages of withdrawal nad placed on CIWA protocol. Found to be significantly hyponatremic and started on IVF and benzos.   Patient will need admission to ICU given the hyponatremia and alcohol withdrawal.     Kendra Pruett MD  11/26/22 9467

## 2022-11-26 NOTE — ED PROVIDER NOTES
[id number] [facility] RESIDENT NOTE       Date of evaluation: 11/26/2022    Chief Complaint     Illness (3-4 generalized fatigue, weakness and dizziness. History of alcoholism, last drink last night. ~1/2 bottle liquor daily.)      History of Present Illness     Amanda Underwood is a 60 y.o. female with a PMHx of alcohol abuse, CHF, cervical dystonia, hypertension, hyperlipidemia who presents to the emergency department today with 3 days of worsening weakness. Patient states that she typically drinks about 1/5 of alcohol a day, last drink yesterday. She also takes Ativan on a regular basis but has not taken this today. Patient states that over the last 3 days she has felt more weak as well as off balance. According to , the patient has had 3 separate episodes where she is fallen backwards even with his support and help. She has not hit her head or lost consciousness. She is not on any blood thinners. She denies any numbness, tingling, or weakness aside from paresthesias in her bilateral feet, however this is not new. She denies any fevers, aches, or chills but states that her face appears flushed. She has withdrawn from alcohol but never has had withdrawal seizures before. She states that this feels somewhat similar to her previous withdrawal.  She denies any nausea or vomiting. She does feel anxious and feels as if she is having a panic attack as well as tremulous. She is mild shortness of breath but no chest pain. No additional symptoms. Other than that mentioned above, there are no other alleviating or provoking factors associated with the patient's presentation today.     Review of Systems     Review of Systems    Review of systems is positive for anxiety, tremor, unbalanced  Review of systems is negative for headache, nausea, vomiting, diarrhea, constipation, fevers, aches, chills  Further review of systems is negative other than that mentioned in the HPI. Past Medical, Surgical, Family, and Social History     She has a past medical history of C. difficile diarrhea, CAD (coronary artery disease), Cervical dystonia, CHF (congestive heart failure) ([address] Dental crown present, Fatty liver, Hyperlipidemia, Hypertension, Lichen sclerosus, and PONV (postoperative nausea and vomiting). She has a past surgical history that includes Breast surgery; Endoscopy, colon, diagnostic; Colonoscopy; Endometrial ablation; Colonoscopy (N/A, 7/2/2019); and Colonoscopy (7/2/2019). Her family history includes Breast Cancer (age of onset: 76) in her mother. She reports that she has never smoked. She has never used smokeless tobacco. She reports current alcohol use of about 3.0 standard drinks per week. She reports that she does not use drugs. Medications     Previous Medications    ASPIRIN EC 81 MG EC TABLET    Take 1 tablet by mouth daily    LIDOCAINE 4 % EXTERNAL PATCH    Place 1 patch onto the skin daily    LORAZEPAM (ATIVAN) 1 MG TABLET    Take 2 mg by mouth nightly. MAGNESIUM CITRATE SOLUTION    Take 296 mLs by mouth ONCE PRN for Constipation    MELATONIN 5 MG TBDP DISINTEGRATING TABLET    Take 1 tablet by mouth nightly    MULTIPLE VITAMIN (MULTIVITAMIN) TABS TABLET    Take 1 tablet by mouth daily    PANTOPRAZOLE SODIUM (PROTONIX) 40 MG PACK PACKET    Take 40 mg by mouth every morning (before breakfast)    POLYETHYL GLYCOL-PROPYL GLYCOL 0.4-0.3 % (SYSTANE) 0.4-0.3 % OPHTHALMIC SOLUTION    Place 1 drop into both eyes nightly as needed for Dry Eyes    SENNOSIDES-DOCUSATE SODIUM (SENOKOT-S) 8.6-50 MG TABLET    Take 2 tablets by mouth in the morning and at bedtime    THIAMINE MONONITRATE (THIAMINE) 100 MG TABLET    Take 1 tablet by mouth daily    VALSARTAN (DIOVAN) 160 MG TABLET    Take 160 mg by mouth daily 1/2 to 1 tablet daily based on BP    ZOLPIDEM (AMBIEN CR) 12.5 MG EXTENDED RELEASE TABLET    Take 12.5 mg by mouth nightly as needed for Sleep.        Allergies She is allergic to atorvastatin, iodine, pravastatin, rosuvastatin, shellfish allergy, sulfa antibiotics, tylenol [acetaminophen], and lunesta [eszopiclone]. Physical Exam     INITIAL VITALS: BP: (!) 148/89, Temp: 98.2 °F (36.8 °C), Heart Rate: 100, Resp: 16, SpO2: 97 %     Gen: NAD, in bed comfortably, non-diaphoretic   Head/Eyes: Atraumatic. PERRL. EOMI bilaterally. No conjunctival injection or scleral icterus   ENT: Neck supple, trachea midline, no LAD. MMM, no posterior oropharyngeal erythema or exudate. External auditory meatus clear without discharge or erythema. No nasal congestion or discharge. Cardiovascular: RRR no murmurs, rubs, or gallops. Pulmonary:Lungs CTAB, no rales, wheezes, or ronchi   Abdominal: Soft, non-tender. No rebound or guarding. Non-peritoneal   MSK: no obvious long bone deformity. Skin:  Warm, dry. No rashes, ecchymosis or cyanosis. Neuro: Alert and oriented x 4. Cranial nerves II-XII intact. Sensation intact to light touch in bilateral upper and lower extremities. Patient has normal finger-to-nose and heel-to-shin. She has evidence of constant tremor and tongue fasciculations. She is unable to ambulate or tolerate standing due to persistent tremors and feeling off balance. RUE: 5/5 strength at the shoulder, elbow, wrist, and  with activation of all major muscle groups        LUE: 5/5 strength at the shoulder, elbow, wrist, and  with activation of all major muscle groups        RLE: 5/5 strength at the hip, knee, and ankle with activation of all major muscle groups       LLE: 5/5 strength at the hip, knee, and ankle with activation of all major muscle groups   Extremities:  No peripheral edema. Psych: Euthymic affect. Normal rate and rhythm of speech with full prosody. Linear thought process.      DiagnosticResults     EKG   See ED course    RADIOLOGY:  XR CHEST PORTABLE   Final Result      Stable elevation right hemidiaphragm with minor atelectatic changes right lower lung. Mild vascular redistribution. CT HEAD WO CONTRAST   Final Result   1. No evidence for acute intracranial process. No bleed or shift   2. Frontal temporal atrophy   3.  Mild periventricular chronic encephalopathy          LABS:   Results for orders placed or performed during the hospital encounter of 11/26/22   CBC with Auto Differential   Result Value Ref Range    WBC 5.1 4.0 - 11.0 K/uL    RBC 4.30 4.00 - 5.20 M/uL    Hemoglobin 13.9 12.0 - 16.0 g/dL    Hematocrit 39.8 36.0 - 48.0 %    MCV 92.5 80.0 - 100.0 fL    MCH 32.3 26.0 - 34.0 pg    MCHC 34.9 31.0 - 36.0 g/dL    RDW 16.9 (H) 12.4 - 15.4 %    Platelets 255 (L) 113 - 450 K/uL    MPV 8.4 5.0 - 10.5 fL    Neutrophils % 81.1 %    Lymphocytes % 8.8 %    Monocytes % 9.6 %    Eosinophils % 0.0 %    Basophils % 0.5 %    Neutrophils Absolute 4.1 1.7 - 7.7 K/uL    Lymphocytes Absolute 0.5 (L) 1.0 - 5.1 K/uL    Monocytes Absolute 0.5 0.0 - 1.3 K/uL    Eosinophils Absolute 0.0 0.0 - 0.6 K/uL    Basophils Absolute 0.0 0.0 - 0.2 K/uL   CMP w/ Reflex to MG   Result Value Ref Range    Sodium 116 (LL) 136 - 145 mmol/L    Potassium reflex Magnesium 4.4 3.5 - 5.1 mmol/L    Chloride 74 (L) 99 - 110 mmol/L    CO2 17 (L) 21 - 32 mmol/L    Anion Gap 25 (H) 3 - 16    Glucose 109 (H) 70 - 99 mg/dL    BUN 10 7 - 20 mg/dL    Creatinine 0.6 0.6 - 1.2 mg/dL    Est, Glom Filt Rate >60 >60    Calcium 10.3 8.3 - 10.6 mg/dL    Total Protein 8.2 6.4 - 8.2 g/dL    Albumin 4.5 3.4 - 5.0 g/dL    Albumin/Globulin Ratio 1.2 1.1 - 2.2    Total Bilirubin 1.8 (H) 0.0 - 1.0 mg/dL    Alkaline Phosphatase 106 40 - 129 U/L    ALT 51 (H) 10 - 40 U/L     (H) 15 - 37 U/L   ETOH   Result Value Ref Range    Ethanol Lvl 29 mg/dL   Lactic Acid   Result Value Ref Range    Lactic Acid 5.1 (HH) 0.4 - 2.0 mmol/L   Protime-INR   Result Value Ref Range    Protime 13.4 11.7 - 14.5 sec    INR 1.03 0.87 - 1.14   Blood gas, venous (Lab)   Result Value Ref Range    pH, Livan 7.469 (H) 7.350 - 7.450    pCO2, Livan 31.1 (L) 41.0 - 51.0 mmHg    pO2, Livan 50.7 (H) 25.0 - 40.0 mmHg    HCO3, Venous 22.5 (L) 24.0 - 28.0 mmol/L    Base Excess, Livan -0.2 -2.0 - 3.0 mmol/L    O2 Sat, Livan 85 Not established %    Carboxyhemoglobin 1.4 0.0 - 1.5 %    MetHgb, Livan 0.2 0.0 - 1.5 %    TC02 (Calc), Livan 24 mmol/L    Hemoglobin, Livan, Reduced 14.40 %   Lactic Acid   Result Value Ref Range    Lactic Acid 1.9 0.4 - 2.0 mmol/L   Basic Metabolic Panel w/ Reflex to MG   Result Value Ref Range    Sodium 116 (LL) 136 - 145 mmol/L    Potassium reflex Magnesium 4.6 3.5 - 5.1 mmol/L    Chloride 76 (L) 99 - 110 mmol/L    CO2 21 21 - 32 mmol/L    Anion Gap 19 (H) 3 - 16    Glucose 112 (H) 70 - 99 mg/dL    BUN 10 7 - 20 mg/dL    Creatinine 0.5 (L) 0.6 - 1.2 mg/dL    Est, Glom Filt Rate >60 >60    Calcium 9.9 8.3 - 10.6 mg/dL       ED BEDSIDE ULTRASOUND:  None    RECENT VITALS:  BP: (!) 141/80, Temp: 98.2 °F (36.8 °C), Heart Rate: 96,Resp: 22, SpO2: 94 %     Procedures     None    ED Course     Nursing Notes, Past Medical Hx, Past Surgical Hx, Social Hx, Allergies, and Family Hx were reviewed.     The patient was given the followingmedications:  Orders Placed This Encounter   Medications    OR Linked Order Group     LORazepam (ATIVAN) tablet 1 mg     LORazepam (ATIVAN) 2 MG/ML concentrated solution 1 mg     LORazepam (ATIVAN) tablet 2 mg     LORazepam (ATIVAN) 2 MG/ML concentrated solution 2 mg     LORazepam (ATIVAN) tablet 3 mg     LORazepam (ATIVAN) 2 MG/ML concentrated solution 3 mg     LORazepam (ATIVAN) tablet 4 mg     LORazepam (ATIVAN) 2 MG/ML concentrated solution 4 mg    thiamine (B-1) injection 100 mg    DISCONTD: dextrose 5 % in lactated ringers infusion    ondansetron (ZOFRAN) 4 MG/2ML injection     Jesse Lyons: cabinet override    ondansetron (ZOFRAN) injection 4 mg    dextrose 5 % in lactated ringers infusion       CONSULTS:  IP CONSULT TO HOSPITALIST  IP CONSULT TO CRITICAL CARE  IP CONSULT TO NEUROLOGY    ED Course as of 11/26/22 1538   Sat Nov 26, 2022   1038 Patient is a 69-year-old female with history and presentation as noted above who is presenting to the emergency department today with weakness, decreased balance/gait instability, and diffuse tremors. On physical examination, the patient has a CIWA of 13 and appears to be withdrawing actively from alcohol. She is noted to have bilateral upper and lower extremity tremors with tongue fasciculations. Patient last had a drink yesterday. Patient without history of alcohol withdrawal seizures. CIWA protocol has been initiated in addition to lorazepam CIWA protocol drug administration orders. Patient otherwise has a physical examination remarkable for difficulty with ambulation/standing, with gait instability. As result, will obtain CT head at this time. However, history and presentation is inconsistent with TIA or stroke. Low concern for acute intracranial bleed. Further, patient with slight shortness of breath. Will obtain chest x-ray and EKG. Patient however does not have any symptoms of chest pain and thus my suspicion for ACS is low. Do not believe patient requires further evaluation for this. [JF]   1126 CT HEAD WO CONTRAST  IMPRESSION:  1. No evidence for acute intracranial process. No bleed or shift  2. Frontal temporal atrophy  3. Mild periventricular chronic encephalopathy [JF]   1126 Further, history and physical examination is concerning for Warnicke's. [JF]   1126 Sodium(!!): 116  Patient has profound hyponatremia with what appears to be a metabolic, anion gap acidosis with a lactate of 5.1. Patient is receiving 1 L of D5 LR and will require further volume resuscitation, albeit somewhat cautiously with such profound hyponatremia. Patient does have an elevated AST and ALT and a ratio consistent with underlying hepatic injury from alcohol abuse. Further, patient without any anemia or leukocytosis.   However, patient does have a thrombocytopenia, further likely associated with the patient's underlying alcoholic disease. PT/INR within normal limits. [JF]   1152 Patient with nausea. Will give Zofran. [JF]   1152 XR CHEST PORTABLE  IMPRESSION:     Stable elevation right hemidiaphragm with minor atelectatic changes right lower lung. Mild vascular redistribution. [JF]   1213 Blood gas, venous (Lab)(!):    pH, Livan 7.469(!)   pCO2, Livan 31.1(!)   pO2, Livan 50.7(!)   HCO3, Venous 22.[address] [facility] Excess, Livan -0.2   O2 Sat, Livan 85   Carboxyhemoglobin 1.4   MetHgb, Livan 0.2   TC02 (Calc), Livan 24   Hemoglobin, Livan, Reduced 14.40  Patient with a metabolic acidosis with what appears to be a respiratory alkalosis, likely with compensation. There, patient has significant decrease in bicarb. [JF]   1215 Will repeat BMP and Lactate. [JF]   1235 Given significant hyponatremia, alcohol withdrawal, and lactic acidosis, I do believe patient will require ICU level of care. I will reach out to the hospitalist and associated ICU team. [JF]   1245 EKG obtained here in the emergency department reveals a sinus tachycardia with ventricular rate of 100 bpm.  Parable is within normal limits. QRS is narrow. No QT or QTc prolongation. Normal axis. There is no significant ST elevation or depression. There is evidence of baseline wandering, likely associated with the patient's tremor but no obvious ST or T wave abnormalities. No STEMI. [JF]   1246 T wave inversion that was noted in 7/7/2022 is not noted today on EKG, otherwise, no significant difference from prior EKG. [JF]   1259 I have spoken with the ICU who agrees with admission [JF]   1310 I have spoken with the admitting ICU as well as hospitalist team who have accepted the patient for admission to the ICU. Patient will require careful supplementation of her sodium and management of her underlying acid-base status/electrolyte abnormalities. Patient will remain on CIWA protocol. Patient will be transferred to the ICU shortly.  [JF] 200 Pt accepted by ICU physician for admission  [JF]      ED Course User Index  [JF] Alirio Wen MD       [address] [facility] / Jose Rafael Kaur is a 60 y.o. femalewith a history of present illness, physical examination, past medical history as noted above who presents to the emergency department today with multiple complaints as described above. Please see ED course for MDM    This patient was also evaluated by the attending physician. All care plans werediscussed and agreed upon. Clinical Impression     1. Hyponatremia    2. Alcohol withdrawal syndrome with complication (HCC)        Disposition     PATIENT REFERRED TO:  No follow-up provider specified.     DISCHARGE MEDICATIONS:  New Prescriptions    No medications on file       DISPOSITION Admitted 11/26/2022 01:36:05 PM       Alirio Wen MD  11/26/22 1405

## 2022-11-26 NOTE — CONSULTS
ICU HISTORY AND PHYSICAL       Hospital Day: 1  ICU Day: 1                                                         Code:Limited  Admit Date: 11/26/2022  PCP: Jonna Wade                                  CC: weakness    HISTORY OF PRESENT ILLNESS:   Patient is a 58-year-old female with PMHx of alcohol abuse, GERD, HTN, HLD, HFpEF, CAD and cervical dystonia who presented with weakness. Patient states that for the past 2 weeks she has had bilateral lower extremity weakness making it difficult for her to walk. She states that she has fallen several times because of that but denies any trauma or loss of consciousness, lightheadedness or dizziness. Patient states that she drinks about half of 1/5 of bourbon per day and her last drink was yesterday. Patient states that she has withdrawal from alcohol before but denies any alcohol withdrawal seizures. Patient states that she has been having decreased PO intake and appetite in the past several days. Patient otherwise does not have any other complaints. She denies any fever/chills, chest pain, shortness of breath, nausea/vomiting, abdominal pain, diarrhea or constipation. In the ED, patient was stable and afebrile. Labs were remarkable for a hyponatremia of 116, anion gap of 25 and lactic acidosis of 5.1. CT head showed no evidence of acute intercranial abnormality. Patient was given a liter of D5LR with improvement of lactic acidosis to 1.9.  Patient was admitted for further management of hyponatremia and alcohol withdrawal.    PAST HISTORY:     Past Medical History:   Diagnosis Date    C. difficile diarrhea 05/15/2021    CAD (coronary artery disease)     Cervical dystonia     CHF (congestive heart failure) (HCC)     Dental crown present     upper right and bonding    Fatty liver     Hyperlipidemia     Hypertension     Lichen sclerosus     PONV (postoperative nausea and vomiting)        Past Surgical History:   Procedure Laterality Date    BREAST SURGERY COLONOSCOPY      COLONOSCOPY N/A 7/2/2019    COLONOSCOPY WITH BIOPSY performed by Alfie Keene MD at [facility]  7/2/2019    COLONOSCOPY POLYPECTOMY SNARE/COLD BIOPSY performed by Alfie Keene MD at [address]      ENDOSCOPY, COLON, DIAGNOSTIC         Family History:  Family History   Problem Relation Age of Onset    Breast Cancer Mother 76       MEDICATIONS:     No current facility-administered medications on file prior to encounter. Current Outpatient Medications on File Prior to Encounter   Medication Sig Dispense Refill    lidocaine 4 % external patch Place 1 patch onto the skin daily (Patient not taking: Reported on 7/8/2022)      sennosides-docusate sodium (SENOKOT-S) 8.6-50 MG tablet Take 2 tablets by mouth in the morning and at bedtime      melatonin 5 MG TBDP disintegrating tablet Take 1 tablet by mouth nightly      aspirin EC 81 MG EC tablet Take 1 tablet by mouth daily (Patient not taking: Reported on 7/8/2022) 30 tablet 0    magnesium citrate solution Take 296 mLs by mouth ONCE PRN for Constipation      Multiple Vitamin (MULTIVITAMIN) TABS tablet Take 1 tablet by mouth daily 30 tablet 0    thiamine mononitrate (THIAMINE) 100 MG tablet Take 1 tablet by mouth daily 30 tablet 0    pantoprazole sodium (PROTONIX) 40 MG PACK packet Take 40 mg by mouth every morning (before breakfast)      valsartan (DIOVAN) 160 MG tablet Take 160 mg by mouth daily 1/2 to 1 tablet daily based on BP      polyethyl glycol-propyl glycol 0.4-0.3 % (SYSTANE) 0.4-0.3 % ophthalmic solution Place 1 drop into both eyes nightly as needed for Dry Eyes      zolpidem (AMBIEN CR) 12.5 MG extended release tablet Take 12.5 mg by mouth nightly as needed for Sleep. LORazepam (ATIVAN) 1 MG tablet Take 2 mg by mouth nightly.        [DISCONTINUED] dexlansoprazole (DEXILANT) 60 MG CPDR delayed release capsule Take 60 mg by mouth      [DISCONTINUED] irbesartan (AVAPRO) 300 MG tablet Take 300 mg by mouth           Scheduled Meds:   aspirin EC  81 mg Oral Daily    [START ON 11/27/2022] pantoprazole  40 mg Oral QAM AC    vitamin B-1  100 mg Oral Daily    [Held by provider] valsartan  160 mg Oral Daily    sodium chloride flush  10 mL IntraVENous 2 times per day    enoxaparin  40 mg SubCUTAneous Daily    folic acid, thiamine, multi-vitamin with vitamin K infusion   IntraVENous Once    urea  30 g Oral Daily      Continuous Infusions:   dextrose 5% in lactated ringers 1,000 mL (11/26/22 1710)    sodium chloride       PRN Meds:LORazepam **OR** LORazepam **OR** LORazepam **OR** LORazepam **OR** LORazepam **OR** LORazepam **OR** LORazepam **OR** LORazepam, sodium chloride flush, sodium chloride, ondansetron **OR** ondansetron, magnesium hydroxide    Allergies: Allergies   Allergen Reactions    Atorvastatin     Iodine Swelling    Pravastatin     Rosuvastatin     Shellfish Allergy Itching    Sulfa Antibiotics Nausea Only    Tylenol [Acetaminophen]      Fatty liver    Lunesta [Eszopiclone] Nausea And Vomiting       REVIEW OF SYSTEMS:       History obtained from the patient    Review of Systems  Per history   PHYSICAL EXAM:       Vitals: BP (!) 144/93   Pulse 87   Temp 98.6 °F (37 °C) (Oral)   Resp 22   Wt 224 lb 13.9 oz (102 kg)   SpO2 98%   BMI 32.27 kg/m²     I/O:  No intake or output data in the 24 hours ending 11/26/22 1829  No intake/output data recorded. No intake/output data recorded. Physical Examination:     Physical Exam  Constitutional:       General: She is not in acute distress. HENT:      Head: Normocephalic. Mouth/Throat:      Pharynx: Oropharynx is clear. Eyes:      Conjunctiva/sclera: Conjunctivae normal.   Cardiovascular:      Rate and Rhythm: Normal rate and regular rhythm. Pulses: Normal pulses. Heart sounds: Normal heart sounds. No murmur heard. Pulmonary:      Effort: Pulmonary effort is normal. No respiratory distress.       Breath sounds: Normal breath sounds. Abdominal:      General: Abdomen is flat. Bowel sounds are normal. There is no distension. Palpations: Abdomen is soft. Tenderness: There is no abdominal tenderness. Skin:     General: Skin is warm. Findings: No rash. Neurological:      General: No focal deficit present. Mental Status: She is alert and oriented to person, place, and time. Comments: Bilateral tremors           No results for input(s): PHART, [id number], PO2ART in the last 72 hours. DATA:       Labs:  CBC:   Recent Labs     11/26/22  1055   WBC 5.1   HGB 13.9   HCT 39.8   *       BMP:   Recent Labs     11/26/22  1055 11/26/22  1309 11/26/22  1609   * 116* 115*   K 4.4 4.6 4.8   CL 74* 76* 76*   CO2 17* 21 22   BUN 10 10 10   CREATININE 0.6 0.5* <0.5*   GLUCOSE 109* 112* 109*   PHOS  --   --  1.9*     LFT's:   Recent Labs     11/26/22  1055   *   ALT 51*   BILITOT 1.8*   ALKPHOS 106     Troponin: No results for input(s): TROPONINI in the last 72 hours. BNP:No results for input(s): BNP in the last 72 hours. ABGs: No results for input(s): PHART, HZU0NQD, PO2ART in the last 72 hours. INR:   Recent Labs     11/26/22  1055   INR 1.03       U/A:  Recent Labs     11/26/22  1815   COLORU Yellow   PHUR 6.0   WBCUA 10-20*   RBCUA 3-4   MUCUS Rare*   BACTERIA 1+*   CLARITYU Clear   SPECGRAV 1.025   LEUKOCYTESUR Negative   UROBILINOGEN 2.0*   BILIRUBINUR SMALL*   BLOODU Negative   GLUCOSEU 100*   AMORPHOUS 2+       XR CHEST PORTABLE   Final Result      Stable elevation right hemidiaphragm with minor atelectatic changes right lower lung. Mild vascular redistribution. CT HEAD WO CONTRAST   Final Result   1. No evidence for acute intracranial process. No bleed or shift   2. Frontal temporal atrophy   3.  Mild periventricular chronic encephalopathy            ASSESSMENT AND PLAN:   Shahnaz Keith is a 60 y.o. female, with PMHx of alcohol abuse, HTN, HLD, HFpEF and cervical dystonia who presented with weakness. Hyponatremia likely 2/2 to [facility]  Patient presented with the weakness for the past 2 weeks and reportedly drinks about a half of 1/5 of bourbon per day and endorses poor PO intake with decreased appetite. Patient was given a liter of D5LR in the ED  -Nephrology consulted  -500ml bolus of NS  -Continue D5LR  -Urea 30g daily  -Q2h sodium checks   -Check urine studies    Alcohol Abuse  Patient states that she drinks about half of 1/5 of bourbon per day and her last drink was yesterday. Patient states that she has withdrawal from alcohol before but denies any alcohol withdrawal seizures.   -Thiamine 100mg daily  -CIWA with ativan    Bilateral Lower Extremity Weakness, possible Wernicke's  Patient with alcohol abuse presented with bilateral lower extremity weakness  -Neurology consulted    Lactic Acidosis, resolved  Patient presented with a LA of 5.1 that resolved to a LA of 1.9 after receiving a liter of D5LR in the ED    Chronic Issues   HTN - patient is on valsartan and irbesartan at home, hold as patient is normotensive  HLD - not on medications at home  HFpEF - patient is on valsartan and irbesartan at home, hold as patient is normotensive  CAD - continue home aspirin  GERD - continue home pantoprazole    Code Status:Limited  FEN: Regular adult diet   PPX:  Lovenox  DISPO: ICU    This patient will be staffed and discussed with Dr Jeannette Phelps  -----------------------------  Ian Ashby MD, PGY-1  11/26/2022  6:29 PM  Patient examined, findings as discussed with . Agree with assessment and plan. She is not showing signs of active alcohol withdrawal.  Weakness may be secondary to hyponatremia. Treating conservatively with urea and free water restriction, monitor serum sodium level closely in ICU.

## 2022-11-26 NOTE — ED NOTES
Spoke with patient regarding no urine output since being in ED, states \"I'm probably just too dehydrated\". Pt does not feel comfortable attempting to use BSC, pure wick has been in place for several hours with no output. Bladder scan showing 209ml at this time.      Kim Caro RN  11/26/22 9834

## 2022-11-26 NOTE — CONSULTS
Nephrology Consult Note                                                                                                                                                                                                                                                                                                                                                               Office : [phone]     Fax :[phone]              Patient's Name: Jhon Maradiaga  2:42 PM  11/26/2022    Reason for Consult:  hyponatremia   Requesting Physician:  Jenny Mendosa      Chief Complaint:   weakness     History of Present Ilness:    Patient is a 50-year-old female with PMHx of alcohol abuse, HTN, HLD who presented with weakness. Na low   Pt in alc withdrawal   Admitted to ICU   Poor solute intake   High fluid intake   Past Medical History:   Diagnosis Date    C. difficile diarrhea 05/15/2021    CAD (coronary artery disease)     Cervical dystonia     CHF (congestive heart failure) (HCC)     Dental crown present     upper right and bonding    Fatty liver     Hyperlipidemia     Hypertension     Lichen sclerosus     PONV (postoperative nausea and vomiting)        Past Surgical History:   Procedure Laterality Date    BREAST SURGERY      COLONOSCOPY      COLONOSCOPY N/A 7/2/2019    COLONOSCOPY WITH BIOPSY performed by Brielle Kemp MD at [place] [address]  7/2/2019    COLONOSCOPY POLYPECTOMY SNARE/COLD BIOPSY performed by Brielle Kemp MD at [address] [place] [address] Ave, COLON, DIAGNOSTIC         Family History   Problem Relation Age of Onset    Breast Cancer Mother 76        reports that she has never smoked. She has never used smokeless tobacco. She reports current alcohol use of about 3.0 standard drinks per week. She reports that she does not use drugs.     Allergies:  Atorvastatin, Iodine, Pravastatin, Rosuvastatin, Shellfish allergy, Sulfa antibiotics, Tylenol [acetaminophen], and Lunesta [eszopiclone]    Current Medications:    LORazepam (ATIVAN) tablet 1 mg, Q1H PRN   Or  LORazepam (ATIVAN) 2 MG/ML concentrated solution 1 mg, Q1H PRN   Or  LORazepam (ATIVAN) tablet 2 mg, Q1H PRN   Or  LORazepam (ATIVAN) 2 MG/ML concentrated solution 2 mg, Q1H PRN   Or  LORazepam (ATIVAN) tablet 3 mg, Q1H PRN   Or  LORazepam (ATIVAN) 2 MG/ML concentrated solution 3 mg, Q1H PRN   Or  LORazepam (ATIVAN) tablet 4 mg, Q1H PRN   Or  LORazepam (ATIVAN) 2 MG/ML concentrated solution 4 mg, Q1H PRN  dextrose 5 % in lactated ringers infusion, Continuous        Review of Systems:   14 point ROS obtained but were negative except mentioned in HPI      Physical exam:     Vitals:  BP (!) 153/84   Pulse 95   Temp 98.2 °F (36.8 °C) (Oral)   Resp 20   SpO2 97%   Constitutional:  OAA X3 NAD  Skin: no rash, turgor wnl  Heent:  eomi, mmm  Neck: no bruits or jvd noted  Cardiovascular:  S1, S2 without m/r/g  Respiratory: CTA B without w/r/r  Abdomen:  +bs, soft, nt, nd  Ext: + lower extremity edema  Psychiatric: mood and affect appropriate  Musculoskeletal:  Rom, muscular strength intact    Data:   Labs:  CBC:   Recent Labs     11/26/22  1055   WBC 5.1   HGB 13.9   *     BMP:    Recent Labs     11/26/22  1055 11/26/22  1309   * 116*   K 4.4 4.6   CL 74* 76*   CO2 17* 21   BUN 10 10   CREATININE 0.6 0.5*   GLUCOSE 109* 112*     Ca/Mg/Phos:   Recent Labs     11/26/22  1055 11/26/22  1309   CALCIUM 10.3 9.9     Hepatic:   Recent Labs     11/26/22  1055   *   ALT 51*   BILITOT 1.8*   ALKPHOS 106     Troponin: No results for input(s): TROPONINI in the last 72 hours. BNP: No results for input(s): BNP in the last 72 hours. Lipids: No results for input(s): CHOL, TRIG, HDL, LDLCALC, LABVLDL in the last 72 hours. ABGs: No results for input(s): PHART, PO2ART, [id number] in the last 72 hours.   INR:   Recent Labs     11/26/22  1055   INR 1.03     UA:No results for input(s): COLORU, CLARITYU, Elder Garbe, BLOODU, PHUR, PROTEINU, UROBILINOGEN, NITRU, LEUKOCYTESUR, Estella Quach in the last 72 hours. Urine Microscopic: No results for input(s): LABCAST, BACTERIA, COMU, HYALCAST, WBCUA, RBCUA, EPIU in the last 72 hours. Urine Culture: No results for input(s): LABURIN in the last 72 hours. Urine Chemistry: No results for input(s): Panchito Necessary, PROTEINUR, NAUR in the last 72 hours. IMAGING:  XR CHEST PORTABLE   Final Result      Stable elevation right hemidiaphragm with minor atelectatic changes right lower lung. Mild vascular redistribution. CT HEAD WO CONTRAST   Final Result   1. No evidence for acute intracranial process. No bleed or shift   2. Frontal temporal atrophy   3. Mild periventricular chronic encephalopathy          Assessment/Plan   Hyponatremia     2. Alc use     3. Anemia    4. Acid- base/ Electrolyte imbalance     5.  Alc withdrawal     Plan   - Ur studies   - PO urea   - free water restriction   - IVF low rate   - Monitor Na   - Ur studies  - ICU admit per primary team     Recommend to dose adjust all medications  based on renal functions  Maintain SBP> 90 mmHg   Daily weights   AVOID NSAIDs  Avoid Nephrotoxins  Monitor Intake/Output  Call if significant decrease in urine output                   Thank you for allowing us to participate in care of Naima Mathews MD  Feel free to contact me   [facility] [address]  Office : [phone]  Fax :[phone]

## 2022-11-26 NOTE — ED NOTES
Pt against having catheter placed for urine output, or simply to collect UA. Stating she is afraid of getting an infection.      Lolly Skiff, RN  11/26/22 1135

## 2022-11-26 NOTE — ED NOTES
Pt awake, alert and conversational with staff.  at bedside. Critical labs communicated to attending provider. Pt resting quietly. Visible tremors seen. Initial CIWA 13.      Marietta Henderson RN  11/26/22 8426

## 2022-11-27 ENCOUNTER — APPOINTMENT (OUTPATIENT)
Dept: MRI IMAGING | Age: 62
DRG: 641 | End: 2022-11-27
Payer: COMMERCIAL

## 2022-11-27 PROBLEM — F10.939 ALCOHOL WITHDRAWAL SYNDROME WITH COMPLICATION (HCC): Status: ACTIVE | Noted: 2022-07-07

## 2022-11-27 PROBLEM — F10.20 ALCOHOLISM (HCC): Status: ACTIVE | Noted: 2022-02-25

## 2022-11-27 PROBLEM — I15.9 SECONDARY HYPERTENSION: Status: ACTIVE | Noted: 2022-11-27

## 2022-11-27 PROBLEM — E87.8 ELECTROLYTE IMBALANCE: Status: ACTIVE | Noted: 2022-11-27

## 2022-11-27 PROBLEM — R25.1 TREMORS OF NERVOUS SYSTEM: Status: ACTIVE | Noted: 2022-11-27

## 2022-11-27 LAB
A/G RATIO: 1.5 (ref 1.1–2.2)
ALBUMIN SERPL-MCNC: 4.1 G/DL (ref 3.4–5)
ALP BLD-CCNC: 88 U/L (ref 40–129)
ALT SERPL-CCNC: 39 U/L (ref 10–40)
ANION GAP SERPL CALCULATED.3IONS-SCNC: 11 MMOL/L (ref 3–16)
ANISOCYTOSIS: ABNORMAL
AST SERPL-CCNC: 66 U/L (ref 15–37)
BASOPHILS ABSOLUTE: 0 K/UL (ref 0–0.2)
BASOPHILS RELATIVE PERCENT: 0.5 %
BILIRUB SERPL-MCNC: 1.4 MG/DL (ref 0–1)
BUN BLDV-MCNC: 26 MG/DL (ref 7–20)
CALCIUM SERPL-MCNC: 10 MG/DL (ref 8.3–10.6)
CHLORIDE BLD-SCNC: 82 MMOL/L (ref 99–110)
CO2: 27 MMOL/L (ref 21–32)
CREAT SERPL-MCNC: 0.6 MG/DL (ref 0.6–1.2)
EKG ATRIAL RATE: 100 BPM
EKG DIAGNOSIS: NORMAL
EKG P AXIS: 51 DEGREES
EKG P-R INTERVAL: 178 MS
EKG Q-T INTERVAL: 344 MS
EKG QRS DURATION: 100 MS
EKG QTC CALCULATION (BAZETT): 443 MS
EKG R AXIS: 33 DEGREES
EKG T AXIS: 127 DEGREES
EKG VENTRICULAR RATE: 100 BPM
EOSINOPHILS ABSOLUTE: 0 K/UL (ref 0–0.6)
EOSINOPHILS RELATIVE PERCENT: 0.5 %
GFR SERPL CREATININE-BSD FRML MDRD: >60 ML/MIN/{1.73_M2}
GLUCOSE BLD-MCNC: 112 MG/DL (ref 70–99)
HCT VFR BLD CALC: 34.3 % (ref 36–48)
HEMOGLOBIN: 11.9 G/DL (ref 12–16)
LYMPHOCYTES ABSOLUTE: 0.7 K/UL (ref 1–5.1)
LYMPHOCYTES RELATIVE PERCENT: 23.1 %
MCH RBC QN AUTO: 32.2 PG (ref 26–34)
MCHC RBC AUTO-ENTMCNC: 34.8 G/DL (ref 31–36)
MCV RBC AUTO: 92.6 FL (ref 80–100)
MONOCYTES ABSOLUTE: 0.3 K/UL (ref 0–1.3)
MONOCYTES RELATIVE PERCENT: 10.5 %
NEUTROPHILS ABSOLUTE: 2 K/UL (ref 1.7–7.7)
NEUTROPHILS RELATIVE PERCENT: 65.4 %
PDW BLD-RTO: 16.4 % (ref 12.4–15.4)
PLATELET # BLD: ABNORMAL K/UL (ref 135–450)
PLATELET SLIDE REVIEW: ABNORMAL
PMV BLD AUTO: ABNORMAL FL (ref 5–10.5)
POTASSIUM REFLEX MAGNESIUM: 4.2 MMOL/L (ref 3.5–5.1)
RBC # BLD: 3.7 M/UL (ref 4–5.2)
SODIUM BLD-SCNC: 119 MMOL/L (ref 136–145)
SODIUM BLD-SCNC: 120 MMOL/L (ref 136–145)
SODIUM BLD-SCNC: 121 MMOL/L (ref 136–145)
SODIUM BLD-SCNC: 124 MMOL/L (ref 136–145)
SODIUM BLD-SCNC: 125 MMOL/L (ref 136–145)
SODIUM BLD-SCNC: 126 MMOL/L (ref 136–145)
TOTAL PROTEIN: 6.9 G/DL (ref 6.4–8.2)
URINE CULTURE, ROUTINE: NORMAL
WBC # BLD: 3.1 K/UL (ref 4–11)

## 2022-11-27 PROCEDURE — 6360000002 HC RX W HCPCS: Performed by: HOSPITALIST

## 2022-11-27 PROCEDURE — 85025 COMPLETE CBC W/AUTO DIFF WBC: CPT

## 2022-11-27 PROCEDURE — APPNB60 APP NON BILLABLE TIME 46-60 MINS: Performed by: NURSE PRACTITIONER

## 2022-11-27 PROCEDURE — 72141 MRI NECK SPINE W/O DYE: CPT

## 2022-11-27 PROCEDURE — 70551 MRI BRAIN STEM W/O DYE: CPT

## 2022-11-27 PROCEDURE — 6370000000 HC RX 637 (ALT 250 FOR IP): Performed by: INTERNAL MEDICINE

## 2022-11-27 PROCEDURE — 2580000003 HC RX 258: Performed by: HOSPITALIST

## 2022-11-27 PROCEDURE — 6360000002 HC RX W HCPCS: Performed by: INTERNAL MEDICINE

## 2022-11-27 PROCEDURE — 6370000000 HC RX 637 (ALT 250 FOR IP)

## 2022-11-27 PROCEDURE — 94761 N-INVAS EAR/PLS OXIMETRY MLT: CPT

## 2022-11-27 PROCEDURE — 6370000000 HC RX 637 (ALT 250 FOR IP): Performed by: HOSPITALIST

## 2022-11-27 PROCEDURE — 2000000000 HC ICU R&B

## 2022-11-27 PROCEDURE — 99233 SBSQ HOSP IP/OBS HIGH 50: CPT | Performed by: INTERNAL MEDICINE

## 2022-11-27 PROCEDURE — 84295 ASSAY OF SERUM SODIUM: CPT

## 2022-11-27 PROCEDURE — 93005 ELECTROCARDIOGRAM TRACING: CPT | Performed by: HOSPITALIST

## 2022-11-27 PROCEDURE — 80053 COMPREHEN METABOLIC PANEL: CPT

## 2022-11-27 PROCEDURE — 36415 COLL VENOUS BLD VENIPUNCTURE: CPT

## 2022-11-27 RX ORDER — DIAZEPAM 5 MG/ML
5 INJECTION, SOLUTION INTRAMUSCULAR; INTRAVENOUS ONCE
Status: COMPLETED | OUTPATIENT
Start: 2022-11-27 | End: 2022-11-27

## 2022-11-27 RX ADMIN — ASPIRIN 81 MG: 81 TABLET, COATED ORAL at 08:41

## 2022-11-27 RX ADMIN — PANTOPRAZOLE SODIUM 40 MG: 40 TABLET, DELAYED RELEASE ORAL at 07:01

## 2022-11-27 RX ADMIN — ZOLPIDEM TARTRATE 10 MG: 5 TABLET ORAL at 19:10

## 2022-11-27 RX ADMIN — ENOXAPARIN SODIUM 40 MG: 100 INJECTION SUBCUTANEOUS at 08:40

## 2022-11-27 RX ADMIN — Medication 30 G: at 08:42

## 2022-11-27 RX ADMIN — DIAZEPAM 5 MG: 5 INJECTION, SOLUTION INTRAMUSCULAR; INTRAVENOUS at 16:42

## 2022-11-27 RX ADMIN — LORAZEPAM 2 MG: 1 TABLET ORAL at 19:10

## 2022-11-27 RX ADMIN — SODIUM CHLORIDE, PRESERVATIVE FREE 10 ML: 5 INJECTION INTRAVENOUS at 09:02

## 2022-11-27 RX ADMIN — THIAMINE HCL TAB 100 MG 100 MG: 100 TAB at 08:40

## 2022-11-27 RX ADMIN — LORAZEPAM 2 MG: 1 TABLET ORAL at 21:38

## 2022-11-27 RX ADMIN — LORAZEPAM 3 MG: 1 TABLET ORAL at 14:14
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
## 2022-11-27 NOTE — PROGRESS NOTES
Patient admitted to ICU 25-26-70-73 from ED for hyponatremia. Last na 115 at 1600. A&O x4. VSS. CIWA score 13. Pt appears flushed, diaphoretic and moderately anxious. Tremors noted in BUE. Prn ativan given. 500 cc NS bolus given and will repeat sodium. Educated to avoid po fluids at this time. Able to void per purewick.

## 2022-11-27 NOTE — PROGRESS NOTES
Hospitalist Progress Note      Name:  Shane Tony /Age/Sex: 1960  (60 y.o. female)   MRN & CSN:  [id number] & [id number] Admission Date/Time: 2022 10:16 AM   Location:  4535/9458-42 PCP: Evan Peralta Day: 2    Assessment and Plan:       Severe hyponatremia with a serum sodium 116:  Could be due to beer potomania  Check urinary study urine sodium urine osmolarity and serum osmolarity  Start on IV fluid D5L and monitor serum sodium every 2 hour  Nephrologist on board management as per nephrology  CT head nonacute  Today serum Na 121 from 116 reasonable increase      2-alcohol withdrawal:  Continue on CIWA protocol  Started on banana bag  Thiamine and folic acid  after that  She may need Precedex drip at certain point because of the severe withdrawal symptom and the last drink of alcohol was  night   Consult critical care  ICU placement  Patient report bilateral leg weakness left more than the right: Could be due to severe alcohol congestion? Check   CT head nonacute consult neurology   Order  MRI of the head to rule out stroke or other acute pathology   PT OT evaluation and swallow evaluation  Continue on aspirin  DVT and GI prophylaxis  Check and replace serum electrolytes     3-severe lactic acidosis with a lactic acid 5.1: Could be due to alcohol ingestion? Continue IV fluid and trend lactic acid 1.9   ABG showed pH 7.46 and PCO2 31 indicating metabolic acidosis  Serum bicarbonate 17  Urinalysis clean  No need for IV AB  Check blood culture    Diet ADULT DIET; Regular; 1200 ml   DVT Prophylaxis [x] Lovenox, []  Heparin, [] SCDs, [] Ambulation   GI Prophylaxis [] PPI,  [] H2 Blocker,  [] Carafate,  [] Diet/Tube Feeds   Code Status Limited   Disposition Patient requires continued admission due to    MDM [] Low, [] Moderate,[]  High  Patient's risk as above due to      History of Present Illness:    The patient was seen and examined at bedside  Order MRI of brain as the patient still has bilateral leg weakness  Discussed with  at bedside       Objective: Intake/Output Summary (Last 24 hours) at 11/27/2022 1053  Last data filed at 11/27/2022 0429  Gross per 24 hour   Intake 1738.53 ml   Output 1050 ml   Net 688.53 ml      Vitals:   Vitals:    11/27/22 0400   BP: (!) 154/70   Pulse: 67   Resp: 20   Temp: 98.7 °F (37.1 °C)   SpO2: 94%     Physical Exam:   GEN Awake.  Alert , not in respiratory distress, not in pain  HEENT: PEERLA, , supple neck,   Chest: air entry equal bilaterally, no wheezing or crepitation  Heart: S1 and S2 heard, no murmur, no gallop or rub, regular rate  Abdomen: soft, ND , Nt, +BS  Extremities: no cyanosis, tenderness or erythema, peripheral pulses audible  Neurology: alert, oriented x3, able to move 4 limbs    Medications:   Medications:    urea  30 g Oral Daily    aspirin EC  81 mg Oral Daily    pantoprazole  40 mg Oral QAM AC    vitamin B-1  100 mg Oral Daily    [Held by provider] valsartan  160 mg Oral Daily    sodium chloride flush  10 mL IntraVENous 2 times per day    enoxaparin  40 mg SubCUTAneous Daily      Infusions:    dextrose 5% in lactated ringers 50 mL/hr at 11/27/22 0429    sodium chloride       PRN Meds: LORazepam, 1 mg, Q1H PRN   Or  LORazepam, 1 mg, Q1H PRN   Or  LORazepam, 2 mg, Q1H PRN   Or  LORazepam, 2 mg, Q1H PRN   Or  LORazepam, 3 mg, Q1H PRN   Or  LORazepam, 3 mg, Q1H PRN   Or  LORazepam, 4 mg, Q1H PRN   Or  LORazepam, 4 mg, Q1H PRN  sodium chloride flush, 10 mL, PRN  sodium chloride, , PRN  ondansetron, 4 mg, Q8H PRN   Or  ondansetron, 4 mg, Q6H PRN  magnesium hydroxide, 30 mL, Daily PRN  zolpidem, 10 mg, Nightly PRN          Electronically signed by Shabbir Tejada MD on 11/27/2022 at 10:53 AM

## 2022-11-27 NOTE — PROGRESS NOTES
Na trending up, most recent 120. Patient states she is feeling much better and doesn't feel as weak. Remains A&O x4, last dose of Ativan given at 2200, only 2 mg so far this shift. Safety precautions in place, will monitor.

## 2022-11-27 NOTE — PLAN OF CARE
Problem: Discharge Planning  Goal: Discharge to home or other facility with appropriate resources  11/27/2022 0453 by Kemi Breen RN  Outcome: Progressing  Flowsheets (Taken 11/26/2022 2000)  Discharge to home or other facility with appropriate resources:   Identify barriers to discharge with patient and caregiver   Arrange for needed discharge resources and transportation as appropriate     Problem: Pain  Goal: Verbalizes/displays adequate comfort level or baseline comfort level  11/27/2022 0453 by Kemi Breen RN  Outcome: Progressing     Problem: Skin/Tissue Integrity  Goal: Absence of new skin breakdown  Description: 1. Monitor for areas of redness and/or skin breakdown  2. Assess vascular access sites hourly  3. Every 4-6 hours minimum:  Change oxygen saturation probe site  4. Every 4-6 hours:  If on nasal continuous positive airway pressure, respiratory therapy assess nares and determine need for appliance change or resting period.   Outcome: Progressing     Problem: Safety - Adult  Goal: Free from fall injury  Outcome: Progressing     Problem: ABCDS Injury Assessment  Goal: Absence of physical injury  Outcome: Progressing

## 2022-11-27 NOTE — PROGRESS NOTES
Speech Language Pathology    Order received and chart reviewed. D/W RN and MD via [employer]. BSE order received d/t concerns for CVA given leg weakness per MD. RN reported no concerns for dysphagia and pt on regular consistency diet. Attempted to complete but pt politely declining as she had just finished lunch and did not want any additional PO d/t concerns for reflux. Agreeable to SLP to check back tomorrow. MRI brain pending. Will plan to re-attempt as appropriate.     Mari Gilbert MA CCC-SLP; .[id number]  Speech-Language Pathologist  Pager # [phone]  Phone # [phone]  Office # [phone]

## 2022-11-27 NOTE — PLAN OF CARE
Problem: Discharge Planning  Goal: Discharge to home or other facility with appropriate resources  Outcome: Progressing  Flowsheets (Taken 11/26/2022 1700)  Discharge to home or other facility with appropriate resources:   Identify barriers to discharge with patient and caregiver   Arrange for needed discharge resources and transportation as appropriate     Problem: Pain  Goal: Verbalizes/displays adequate comfort level or baseline comfort level  Outcome: Progressing

## 2022-11-27 NOTE — PLAN OF CARE
Problem: Discharge Planning  Goal: Discharge to home or other facility with appropriate resources  11/27/2022 1643 by Lida Vila RN  Outcome: Progressing  11/27/2022 0453 by Aimee Claros RN  Outcome: Progressing  Flowsheets (Taken 11/26/2022 2000)  Discharge to home or other facility with appropriate resources:   Identify barriers to discharge with patient and caregiver   Arrange for needed discharge resources and transportation as appropriate     Problem: Pain  Goal: Verbalizes/displays adequate comfort level or baseline comfort level  11/27/2022 1643 by Lida Vila RN  Outcome: Progressing  11/27/2022 0453 by Aimee Claros RN  Outcome: Progressing     Problem: Skin/Tissue Integrity  Goal: Absence of new skin breakdown  Description: 1. Monitor for areas of redness and/or skin breakdown  2. Assess vascular access sites hourly  3. Every 4-6 hours minimum:  Change oxygen saturation probe site  4. Every 4-6 hours:  If on nasal continuous positive airway pressure, respiratory therapy assess nares and determine need for appliance change or resting period.   11/27/2022 1643 by Lida Vila RN  Outcome: Progressing  11/27/2022 0453 by Aimee Claros RN  Outcome: Progressing     Problem: Safety - Adult  Goal: Free from fall injury  11/27/2022 1643 by Lida Vila RN  Outcome: Progressing  11/27/2022 0453 by Aimee Claros RN  Outcome: Progressing     Problem: ABCDS Injury Assessment  Goal: Absence of physical injury  11/27/2022 1643 by Lida Vila RN  Outcome: Progressing  11/27/2022 0453 by Aimee Claros RN  Outcome: Progressing

## 2022-11-27 NOTE — PROGRESS NOTES
This RN assumed care at 1700. Patient returned from MRI, placed back on ICU monitor.  at bedside and both updated on and agreeable to plan of care for evening. Safety precautions in place. Will monitor.

## 2022-11-27 NOTE — PROGRESS NOTES
ICU Progress Note    Admit Date: 11/26/2022  Day: 2  Vent Day: None  IV Access:Peripheral  IV Fluids:None  Vasopressors:None                Antibiotics: None  Diet: ADULT DIET; Regular; 1200 ml    CC: weakness    Interval history:   Patient seen and examined at bedside this AM. EMILIAO. Patient states that she is feeling slightly better with improvement in her weakness and tremors. She does not have any acute complaints at this time. HPI:   Patient is a 60-year-old female with PMHx of alcohol abuse, GERD, HTN, HLD, HFpEF, CAD and cervical dystonia who presented with weakness. Patient states that for the past 2 weeks she has had bilateral lower extremity weakness making it difficult for her to walk. She states that she has fallen several times because of that but denies any trauma or loss of consciousness, lightheadedness or dizziness. Patient states that she drinks about half of 1/5 of bourbon per day and her last drink was yesterday. Patient states that she has withdrawal from alcohol before but denies any alcohol withdrawal seizures. Patient states that she has been having decreased PO intake and appetite in the past several days. Patient otherwise does not have any other complaints. She denies any fever/chills, chest pain, shortness of breath, nausea/vomiting, abdominal pain, diarrhea or constipation. In the ED, patient was stable and afebrile. Labs were remarkable for a hyponatremia of 116, anion gap of 25 and lactic acidosis of 5.1. CT head showed no evidence of acute intercranial abnormality. Patient was given a liter of D5LR with improvement of lactic acidosis to 1.9.  Patient was admitted for further management of hyponatremia and alcohol withdrawal.    Medications:     Scheduled Meds:   urea  30 g Oral Daily    aspirin EC  81 mg Oral Daily    pantoprazole  40 mg Oral QAM AC    vitamin B-1  100 mg Oral Daily    [Held by provider] valsartan  160 mg Oral Daily    sodium chloride flush  10 mL IntraVENous 2 times per day    enoxaparin  40 mg SubCUTAneous Daily     Continuous Infusions:   sodium chloride       PRN Meds:LORazepam **OR** LORazepam **OR** LORazepam **OR** LORazepam **OR** LORazepam **OR** LORazepam **OR** LORazepam **OR** LORazepam, sodium chloride flush, sodium chloride, ondansetron **OR** ondansetron, magnesium hydroxide, zolpidem    Objective:   Vitals:   T-max:  No data found. Intake/Output Summary (Last 24 hours) at 11/27/2022 1357  Last data filed at 11/27/2022 0429  Gross per 24 hour   Intake 1738.53 ml   Output 1050 ml   Net 688.53 ml       Review of Systems  Per interval history     Physical Exam  Constitutional:       General: She is not in acute distress. HENT:      Head: Normocephalic. Mouth/Throat:      Pharynx: Oropharynx is clear. Eyes:      Conjunctiva/sclera: Conjunctivae normal.   Cardiovascular:      Rate and Rhythm: Normal rate and regular rhythm. Pulses: Normal pulses. Heart sounds: Normal heart sounds. No murmur heard. Pulmonary:      Effort: Pulmonary effort is normal. No respiratory distress. Breath sounds: Normal breath sounds. Abdominal:      General: Abdomen is flat. Bowel sounds are normal. There is no distension. Palpations: Abdomen is soft. Tenderness: There is no abdominal tenderness. Skin:     General: Skin is warm. Findings: No rash. Neurological:      General: No focal deficit present. Mental Status: She is alert and oriented to person, place, and time.    Psychiatric:         Behavior: Behavior normal.         LABS:    CBC:   Recent Labs     11/26/22  1055 11/27/22  0252   WBC 5.1 3.1*   HGB 13.9 11.9*   HCT 39.8 34.3*   * see below   MCV 92.5 92.6     Renal:    Recent Labs     11/26/22  1309 11/26/22  1609 11/26/22  1933 11/27/22  0252 11/27/22  0605 11/27/22  0918   * 115*   < > 120* 124* 121*   K 4.6 4.8  --  4.2  --   --    CL 76* 76*  --  82*  --   --    CO2 21 22 --  27  --   --    BUN 10 10  --  26*  --   --    CREATININE 0.5* <0.5*  --  0.6  --   --    GLUCOSE 112* 109*  --  112*  --   --    CALCIUM 9.9 9.9  --  10.0  --   --    PHOS  --  1.9*  --   --   --   --    ANIONGAP 19* 17*  --  11  --   --     < > = values in this interval not displayed. Hepatic:   Recent Labs     11/26/22  1055 11/26/22  1609 11/27/22  0252   *  --  66*   ALT 51*  --  39   BILITOT 1.8*  --  1.4*   PROT 8.2  --  6.9   LABALBU 4.5 4.3 4.1   ALKPHOS 106  --  88     Troponin: No results for input(s): TROPONINI in the last 72 hours. BNP: No results for input(s): BNP in the last 72 hours. Lipids: No results for input(s): CHOL, HDL in the last 72 hours. Invalid input(s): LDLCALCU, TRIGLYCERIDE  ABGs:  No results for input(s): PHART, MDN6PSH, PO2ART, TML5KSZ, BEART, THGBART, N2FRCBGF, PLS3SCJ in the last 72 hours. INR:   Recent Labs     11/26/22  1055   INR 1.03     Lactate: No results for input(s): LACTATE in the last 72 hours. Cultures:  -----------------------------------------------------------------  RAD:   XR CHEST PORTABLE   Final Result      Stable elevation right hemidiaphragm with minor atelectatic changes right lower lung. Mild vascular redistribution. CT HEAD WO CONTRAST   Final Result   1. No evidence for acute intracranial process. No bleed or shift   2. Frontal temporal atrophy   3. Mild periventricular chronic encephalopathy      MRI BRAIN WO CONTRAST    (Results Pending)   MRI CERVICAL SPINE WO CONTRAST    (Results Pending)         Assessment/Plan:   Naif Craig is a 60 y.o. female, with PMHx of alcohol abuse, HTN, HLD, HFpEF and cervical dystonia who presented with weakness. Hyponatremia likely 2/2 to [employer], improving   Patient presented with the weakness for the past 2 weeks and reportedly drinks about a half of 1/5 of bourbon per day and endorses poor PO intake with decreased appetite. Patient was given D5LR.  Na 116->124->121  -Nephrology consulted  -Urea 30g daily  -Q6h sodium checks   -Fluid restriction 1200ml    Alcohol Abuse  Patient states that she drinks about half of 1/5 of bourbon per day and her last drink was yesterday. Patient states that she has withdrawal from alcohol before but denies any alcohol withdrawal seizures.   -Thiamine 100mg daily  -CIWA with ativan     Bilateral Lower Extremity Weakness, possible Wernicke's  Patient with alcohol abuse presented with bilateral lower extremity weakness  -Neurology consulted  -Obtain MRI of brain and cervical spine -rule out stroke versus cervical stenosis     Lactic Acidosis, resolved  Patient presented with a LA of 5.1 that resolved to a LA of 1.9 after receiving a liter of D5LR in the ED     Chronic Issues   HTN - patient is on valsartan and irbesartan at home, hold as patient is normotensive  HLD - not on medications at home  HFpEF - patient is on valsartan and irbesartan at home, hold as patient is normotensive  CAD - continue home aspirin  GERD - continue home pantoprazole     Code Status:Limited  FEN: Regular adult diet   PPX:  Lovenox  DISPO: ICU    Melissa Villavicencio MD, PGY-1  11/27/22  1:57 PM    This patient has been staffed and discussed with Dr Arpita Reilly    Patient examined, findings as discussed with . Agree with assessment and plan. Continued weakness. Serum sodium slowly correcting, monitor progress as this approach is normal.  No signs of active alcohol withdrawal at this point. MRI brain planned, would allow us sodium level to improve further as a first step.

## 2022-11-28 ENCOUNTER — APPOINTMENT (OUTPATIENT)
Dept: MRI IMAGING | Age: 62
DRG: 641 | End: 2022-11-28
Payer: COMMERCIAL

## 2022-11-28 PROBLEM — R29.898 WEAKNESS OF BOTH LEGS: Status: ACTIVE | Noted: 2022-11-28

## 2022-11-28 LAB
A/G RATIO: 1.5 (ref 1.1–2.2)
ALBUMIN SERPL-MCNC: 4.1 G/DL (ref 3.4–5)
ALP BLD-CCNC: 82 U/L (ref 40–129)
ALT SERPL-CCNC: 109 U/L (ref 10–40)
ANION GAP SERPL CALCULATED.3IONS-SCNC: 13 MMOL/L (ref 3–16)
AST SERPL-CCNC: 301 U/L (ref 15–37)
BASOPHILS ABSOLUTE: 0 K/UL (ref 0–0.2)
BASOPHILS RELATIVE PERCENT: 0 %
BILIRUB SERPL-MCNC: 1.5 MG/DL (ref 0–1)
BUN BLDV-MCNC: 16 MG/DL (ref 7–20)
CALCIUM SERPL-MCNC: 10.2 MG/DL (ref 8.3–10.6)
CHLORIDE BLD-SCNC: 89 MMOL/L (ref 99–110)
CO2: 26 MMOL/L (ref 21–32)
CREAT SERPL-MCNC: <0.5 MG/DL (ref 0.6–1.2)
EKG ATRIAL RATE: 87 BPM
EKG DIAGNOSIS: NORMAL
EKG P AXIS: -10 DEGREES
EKG P-R INTERVAL: 174 MS
EKG Q-T INTERVAL: 394 MS
EKG QRS DURATION: 110 MS
EKG QTC CALCULATION (BAZETT): 474 MS
EKG R AXIS: 20 DEGREES
EKG T AXIS: 70 DEGREES
EKG VENTRICULAR RATE: 87 BPM
EOSINOPHILS ABSOLUTE: 0 K/UL (ref 0–0.6)
EOSINOPHILS RELATIVE PERCENT: 0 %
GFR SERPL CREATININE-BSD FRML MDRD: >60 ML/MIN/{1.73_M2}
GLUCOSE BLD-MCNC: 103 MG/DL (ref 70–99)
HCT VFR BLD CALC: 33.5 % (ref 36–48)
HEMOGLOBIN: 11.6 G/DL (ref 12–16)
LYMPHOCYTES ABSOLUTE: 0.8 K/UL (ref 1–5.1)
LYMPHOCYTES RELATIVE PERCENT: 25 %
MCH RBC QN AUTO: 32.2 PG (ref 26–34)
MCHC RBC AUTO-ENTMCNC: 34.5 G/DL (ref 31–36)
MCV RBC AUTO: 93.4 FL (ref 80–100)
MONOCYTES ABSOLUTE: 0.2 K/UL (ref 0–1.3)
MONOCYTES RELATIVE PERCENT: 5 %
NEUTROPHILS ABSOLUTE: 2.2 K/UL (ref 1.7–7.7)
NEUTROPHILS RELATIVE PERCENT: 70 %
PDW BLD-RTO: 16.6 % (ref 12.4–15.4)
PLATELET # BLD: ABNORMAL K/UL (ref 135–450)
PLATELET SLIDE REVIEW: ABNORMAL
PMV BLD AUTO: ABNORMAL FL (ref 5–10.5)
POTASSIUM REFLEX MAGNESIUM: 3.6 MMOL/L (ref 3.5–5.1)
RBC # BLD: 3.59 M/UL (ref 4–5.2)
RBC # BLD: NORMAL 10*6/UL
SLIDE REVIEW: ABNORMAL
SODIUM BLD-SCNC: 126 MMOL/L (ref 136–145)
SODIUM BLD-SCNC: 126 MMOL/L (ref 136–145)
SODIUM BLD-SCNC: 127 MMOL/L (ref 136–145)
SODIUM BLD-SCNC: 128 MMOL/L (ref 136–145)
TOTAL PROTEIN: 6.9 G/DL (ref 6.4–8.2)
WBC # BLD: 3.1 K/UL (ref 4–11)

## 2022-11-28 PROCEDURE — 97116 GAIT TRAINING THERAPY: CPT

## 2022-11-28 PROCEDURE — 94761 N-INVAS EAR/PLS OXIMETRY MLT: CPT

## 2022-11-28 PROCEDURE — 93010 ELECTROCARDIOGRAM REPORT: CPT | Performed by: INTERNAL MEDICINE

## 2022-11-28 PROCEDURE — 97535 SELF CARE MNGMENT TRAINING: CPT

## 2022-11-28 PROCEDURE — 97165 OT EVAL LOW COMPLEX 30 MIN: CPT

## 2022-11-28 PROCEDURE — 97530 THERAPEUTIC ACTIVITIES: CPT

## 2022-11-28 PROCEDURE — 80053 COMPREHEN METABOLIC PANEL: CPT

## 2022-11-28 PROCEDURE — 97162 PT EVAL MOD COMPLEX 30 MIN: CPT

## 2022-11-28 PROCEDURE — 99232 SBSQ HOSP IP/OBS MODERATE 35: CPT | Performed by: INTERNAL MEDICINE

## 2022-11-28 PROCEDURE — 72146 MRI CHEST SPINE W/O DYE: CPT

## 2022-11-28 PROCEDURE — 36415 COLL VENOUS BLD VENIPUNCTURE: CPT

## 2022-11-28 PROCEDURE — 6370000000 HC RX 637 (ALT 250 FOR IP): Performed by: INTERNAL MEDICINE

## 2022-11-28 PROCEDURE — 72148 MRI LUMBAR SPINE W/O DYE: CPT

## 2022-11-28 PROCEDURE — 2580000003 HC RX 258: Performed by: HOSPITALIST

## 2022-11-28 PROCEDURE — 6360000002 HC RX W HCPCS: Performed by: HOSPITALIST

## 2022-11-28 PROCEDURE — 2000000000 HC ICU R&B

## 2022-11-28 PROCEDURE — 85025 COMPLETE CBC W/AUTO DIFF WBC: CPT

## 2022-11-28 PROCEDURE — 99232 SBSQ HOSP IP/OBS MODERATE 35: CPT | Performed by: NURSE PRACTITIONER

## 2022-11-28 PROCEDURE — 1200000000 HC SEMI PRIVATE

## 2022-11-28 PROCEDURE — 6360000002 HC RX W HCPCS: Performed by: NURSE PRACTITIONER

## 2022-11-28 PROCEDURE — 6370000000 HC RX 637 (ALT 250 FOR IP): Performed by: HOSPITALIST

## 2022-11-28 PROCEDURE — 84295 ASSAY OF SERUM SODIUM: CPT

## 2022-11-28 PROCEDURE — 6370000000 HC RX 637 (ALT 250 FOR IP)

## 2022-11-28 PROCEDURE — 92610 EVALUATE SWALLOWING FUNCTION: CPT

## 2022-11-28 RX ORDER — DIAZEPAM 5 MG/ML
5 INJECTION, SOLUTION INTRAMUSCULAR; INTRAVENOUS ONCE
Status: COMPLETED | OUTPATIENT
Start: 2022-11-28 | End: 2022-11-28

## 2022-11-28 RX ADMIN — LORAZEPAM 2 MG: 1 TABLET ORAL at 16:50

## 2022-11-28 RX ADMIN — SODIUM CHLORIDE, PRESERVATIVE FREE 10 ML: 5 INJECTION INTRAVENOUS at 08:43

## 2022-11-28 RX ADMIN — LORAZEPAM 2 MG: 1 TABLET ORAL at 08:51

## 2022-11-28 RX ADMIN — THIAMINE HCL TAB 100 MG 100 MG: 100 TAB at 08:33

## 2022-11-28 RX ADMIN — Medication 30 G: at 13:04

## 2022-11-28 RX ADMIN — DIAZEPAM 5 MG: 5 INJECTION, SOLUTION INTRAMUSCULAR; INTRAVENOUS at 14:50

## 2022-11-28 RX ADMIN — PANTOPRAZOLE SODIUM 40 MG: 40 TABLET, DELAYED RELEASE ORAL at 08:33

## 2022-11-28 RX ADMIN — ENOXAPARIN SODIUM 40 MG: 100 INJECTION SUBCUTANEOUS at 08:33

## 2022-11-28 RX ADMIN — LORAZEPAM 2 MG: 1 TABLET ORAL at 14:40

## 2022-11-28 RX ADMIN — LORAZEPAM 2 MG: 1 TABLET ORAL at 23:09

## 2022-11-28 RX ADMIN — ASPIRIN 81 MG: 81 TABLET, COATED ORAL at 08:33

## 2022-11-28 RX ADMIN — Medication 30 G: at 08:34

## 2022-11-28 RX ADMIN — SODIUM CHLORIDE, PRESERVATIVE FREE 10 ML: 5 INJECTION INTRAVENOUS at 20:14

## 2022-11-28 RX ADMIN — ZOLPIDEM TARTRATE 10 MG: 5 TABLET ORAL at 21:35

## 2022-11-28 ASSESSMENT — PAIN DESCRIPTION - LOCATION
LOCATION: GENERALIZED

## 2022-11-28 ASSESSMENT — PAIN SCALES - GENERAL
PAINLEVEL_OUTOF10: 2
PAINLEVEL_OUTOF10: 0
PAINLEVEL_OUTOF10: 4
PAINLEVEL_OUTOF10: 2
PAINLEVEL_OUTOF10: 1
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
## 2022-11-28 NOTE — PROGRESS NOTES
Pt tolerated MRI with PRN medications, back in bred resting with no issue.    Rosalinda Chavarria RN
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
## 2022-11-28 NOTE — PROGRESS NOTES
Pt alert and oriented this morning, resting in bed. Able to answer all questions. Pt x1 assist to bathroom. Able to brush teeth. Pt up in chair. OT ordered, PT already ordered. Will see Pt today.  bedside.      Ihsan Barajas RN
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
## 2022-11-28 NOTE — PLAN OF CARE
Problem: Discharge Planning  Goal: Discharge to home or other facility with appropriate resources  11/28/2022 0106 by Nash Pa RN  Outcome: Progressing  Flowsheets (Taken 11/27/2022 2000)  Discharge to home or other facility with appropriate resources:   Identify barriers to discharge with patient and caregiver   Arrange for needed discharge resources and transportation as appropriate     Problem: Pain  Goal: Verbalizes/displays adequate comfort level or baseline comfort level  11/28/2022 0106 by Nash Pa RN  Outcome: Progressing     Problem: Skin/Tissue Integrity  Goal: Absence of new skin breakdown  Description: 1. Monitor for areas of redness and/or skin breakdown  2. Assess vascular access sites hourly  3. Every 4-6 hours minimum:  Change oxygen saturation probe site  4. Every 4-6 hours:  If on nasal continuous positive airway pressure, respiratory therapy assess nares and determine need for appliance change or resting period.   11/28/2022 0106 by Nash Pa RN  Outcome: Progressing     Problem: Safety - Adult  Goal: Free from fall injury  Recent Flowsheet Documentation  Taken 11/27/2022 2200 by Nash Pa RN  Free From Fall Injury:   Instruct family/caregiver on patient safety   Based on caregiver fall risk screen, instruct family/caregiver to ask for assistance with transferring infant if caregiver noted to have fall risk factors  Problem: ABCDS Injury Assessment  Goal: Absence of physical injury  11/28/2022 0106 by Nash Pa RN  Outcome: Progressing  Flowsheets (Taken 11/27/2022 2200)  Absence of Physical Injury: Implement safety measures based on patient assessment
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
## 2022-11-28 NOTE — PROGRESS NOTES
No acute events overnight. Patient requiring only 4mg of ativan so far during the shift. Noticeably less shaky and stronger on feet while ambulating as the shift progressed.

## 2022-11-28 NOTE — DISCHARGE INSTR - COC
Continuity of Care Form    Patient Name: Jhon Maradiaga   :  1960  MRN:  [id number]    Admit date:  2022  Discharge date:  2022    Code Status Order: Limited   Advance Directives:     Admitting Physician:  No admitting provider for patient encounter.   PCP: [address] [place] [address]    Discharging Nurse:   [address] Unit/Room#: 0205/6328-22  Discharging Unit Phone Number: [phone]    Emergency Contact:   Extended Emergency Contact Information  Primary Emergency Contact: Paul Ulrich  Address: [facility] [address], [address] Phone: [phone]  Work Phone: [phone]  Mobile Phone: [phone]  Relation: Spouse    Past Surgical History:  Past Surgical History:   Procedure Laterality Date    BREAST SURGERY      COLONOSCOPY      COLONOSCOPY N/A 2019    COLONOSCOPY WITH BIOPSY performed by Brielle Kemp MD at [place] [address]  2019    COLONOSCOPY POLYPECTOMY SNARE/COLD BIOPSY performed by Brielle Kemp MD at [address] [place] [address], COLON, DIAGNOSTIC         Immunization History:   Immunization History   Administered Date(s) Administered    COVID-19, [address] [facility], Primary or Immunocompromised, (age 12y+), IM, 100 mcg/0.5mL 2021, 2021       Active Problems:  Patient Active Problem List   Diagnosis Code    Alcohol abuse F10.10    Alcoholism ([facility] [address] F10.20    Alcohol withdrawal syndrome with complication ([facility] [address] C21.062    Hyponatremia E87.1    Tremors of nervous system R25.1    Electrolyte imbalance E87.8    Secondary hypertension I15.9       Isolation/Infection:   Isolation            No Isolation          Patient Infection Status       Infection Onset Added Last Indicated Last Indicated By Review Planned Expiration Resolved Resolved By    None active    Resolved    C-diff Rule Out 05/15/21 05/17/21 05/15/21 C. difficile toxin Molecular (Ordered)   21 Rule-Out Test Resulted    C-diff Rule Out 05/15/21 05/15/21 05/15/21 Clostridium difficile toxin/antigen (Ordered)   05/15/21 Rule-Out Test Resulted            Nurse Assessment:  Last Vital Signs: /83   Pulse 84   Temp 98.2 °F (36.8 °C) (Oral)   Resp 16   Wt 221 lb 9 oz (100.5 kg)   SpO2 98%   BMI 31.79 kg/m²     Last documented pain score (0-10 scale): Pain Level: 2  Last Weight:   Wt Readings from Last 1 Encounters:   11/28/22 221 lb 9 oz (100.5 kg)     Mental Status:  oriented, alert, coherent, logical, thought processes intact, and able to concentrate and follow conversation    IV Access:  - None    Nursing Mobility/ADLs:  Walking   Assisted  Transfer  Independent  [facility]  Med Delivery   whole    Wound Care Documentation and Therapy:  Incision 08/06/19 Abdomen Lateral;Right;Upper (Active)   Number of days: 1602        Elimination:  Continence: Bowel: Yes  Bladder: Yes  Urinary Catheter: None   Colostomy/Ileostomy/Ileal Conduit: No       Date of Last BM: 11/29/2022    Intake/Output Summary (Last 24 hours) at 11/28/2022 1501  Last data filed at 11/28/2022 0900  Gross per 24 hour   Intake 820 ml   Output 700 ml   Net 120 ml     I/O last 3 completed shifts: In: 2438.5 [P.O.:800; I.V.:1638.5]  Out: 1500 [Urine:1500]    Safety Concerns:     None    Impairments/Disabilities:      None    Nutrition Therapy:  Current Nutrition Therapy:   - Oral Diet:  General    Routes of Feeding: Oral  Liquids: No Restrictions  Daily Fluid Restriction: no  Last Modified Barium Swallow with Video (Video Swallowing Test): {Done Not Done [id number]}    Treatments at the Time of Hospital Discharge:   Respiratory Treatments: N/A  Oxygen Therapy:  is not on home oxygen therapy.   Ventilator:    - No ventilator support    Rehab Therapies: Physical Therapy  Weight Bearing Status/Restrictions: No weight bearing restrictions  Other Medical Equipment (for information only, NOT a DME order):  None  Other Treatments: N/A    Patient's personal belongings (please select all that are sent with patient):  Belongings with patient spouse    RN SIGNATURE:  Electronically signed by Radha Rolle RN on 11/29/22 at 11:33 AM EST    CASE MANAGEMENT/SOCIAL WORK SECTION    Inpatient Status Date: ***    Readmission Risk Assessment Score:  Readmission Risk              Risk of Unplanned Readmission:  17           Discharging to Facility/ Agency   [facility]  Address: [facility], [address] [place] [address]  Phone: [phone]  / signature: Electronically signed by César Kumar RN on 11/28/22 at 3:04 PM EST    PHYSICIAN SECTION    Prognosis: Good    Condition at Discharge: Stable    Rehab Potential (if transferring to Rehab): Good    Recommended Labs or Other Treatments After Discharge: Regular home PT/OT as per recommended schedule. Labs to be done at Neurology/PCP/Nephrology visits    Physician Certification: I certify the above information and transfer of Nakita Syed  is necessary for the continuing treatment of the diagnosis listed and that she requires Home Care for greater 30 days.      Update Admission H&P: No change in H&P    PHYSICIAN SIGNATURE:  Electronically signed by Isabelle Chakraborty MD on 11/29/22 at 12:06 PM EST

## 2022-11-28 NOTE — PROGRESS NOTES
Nephrology Note                                                                                                                                                                                                                                                                                                                                                               Office : [phone]     Fax :[phone]              Patient's Name: Mason Schreiber  11:46 AM  11/28/2022    Reason for Consult:  hyponatremia   Requesting Physician:  Priya Remy better   On oral diet now   Solute intake better       Past Medical History:   Diagnosis Date    C. difficile diarrhea 05/15/2021    CAD (coronary artery disease)     Cervical dystonia     CHF (congestive heart failure) ([facility] [address]     Dental crown present     upper right and bonding    Fatty liver     Hyperlipidemia     Hypertension     Lichen sclerosus     PONV (postoperative nausea and vomiting)        Past Surgical History:   Procedure Laterality Date    BREAST SURGERY      COLONOSCOPY      COLONOSCOPY N/A 7/2/2019    COLONOSCOPY WITH BIOPSY performed by Vanessa Dietz MD at [place] [address]  7/2/2019    COLONOSCOPY POLYPECTOMY SNARE/COLD BIOPSY performed by Vanessa Dietz MD at [address] [place] [address], COLON, DIAGNOSTIC         Family History   Problem Relation Age of Onset    Breast Cancer Mother 76        reports that she has never smoked. She has never used smokeless tobacco. She reports current alcohol use of about 3.0 standard drinks per week. She reports that she does not use drugs.     Allergies:  Atorvastatin, Iodine, Pravastatin, Rosuvastatin, Shellfish allergy, Sulfa antibiotics, Tylenol [acetaminophen], and Lunesta [eszopiclone]    Current Medications:    urea (URE-NA) packet 30 g, Daily  LORazepam (ATIVAN) tablet 1 mg, Q1H PRN   Or  LORazepam (ATIVAN) 2 MG/ML concentrated solution 1 mg, Q1H PRN Or  LORazepam (ATIVAN) tablet 2 mg, Q1H PRN   Or  LORazepam (ATIVAN) 2 MG/ML concentrated solution 2 mg, Q1H PRN   Or  LORazepam (ATIVAN) tablet 3 mg, Q1H PRN   Or  LORazepam (ATIVAN) 2 MG/ML concentrated solution 3 mg, Q1H PRN   Or  LORazepam (ATIVAN) tablet 4 mg, Q1H PRN   Or  LORazepam (ATIVAN) 2 MG/ML concentrated solution 4 mg, Q1H PRN  aspirin EC tablet 81 mg, Daily  pantoprazole (PROTONIX) tablet 40 mg, QAM AC  thiamine mononitrate tablet 100 mg, Daily  [Held by provider] valsartan (DIOVAN) tablet 160 mg, Daily  sodium chloride flush 0.9 % injection 10 mL, 2 times per day  sodium chloride flush 0.9 % injection 10 mL, PRN  0.9 % sodium chloride infusion, PRN  enoxaparin (LOVENOX) injection 40 mg, Daily  ondansetron (ZOFRAN-ODT) disintegrating tablet 4 mg, Q8H PRN   Or  ondansetron (ZOFRAN) injection 4 mg, Q6H PRN  magnesium hydroxide (MILK OF MAGNESIA) 400 MG/5ML suspension 30 mL, Daily PRN  zolpidem (AMBIEN) tablet 10 mg, Nightly PRN      Review of Systems:   14 point ROS obtained but were negative except mentioned in HPI      Physical exam:     Vitals:  /66   Pulse 71   Temp 98.3 °F (36.8 °C) (Oral)   Resp 20   Wt 221 lb 9 oz (100.5 kg)   SpO2 98%   BMI 31.79 kg/m²   Constitutional:  OAA X3 NAD  Skin: no rash, turgor wnl  Heent:  eomi, mmm  Neck: no bruits or jvd noted  Cardiovascular:  S1, S2 without m/r/g  Respiratory: CTA B without w/r/r  Abdomen:  +bs, soft, nt, nd  Ext: + lower extremity edema  Psychiatric: mood and affect appropriate  Musculoskeletal:  Rom, muscular strength intact    Data:   Labs:  CBC:   Recent Labs     11/26/22  1055 11/27/22  0252 11/28/22  0403   WBC 5.1 3.1* 3.1*   HGB 13.9 11.9* 11.6*   * see below see below       BMP:    Recent Labs     11/26/22  1609 11/26/22  1933 11/27/22  0252 11/27/22  0605 11/27/22  2141 11/28/22  0403 11/28/22  1041   *   < > 120*   < > 126* 128* 127*   K 4.8  --  4.2  --   --  3.6  --    CL 76*  --  82*  --   --  89*  --    CO2 22  --  27  --   --  26  --    BUN 10  --  26*  --   --  16  --    CREATININE <0.5*  --  0.6  --   --  <0.5*  --    GLUCOSE 109*  --  112*  --   --  103*  --     < > = values in this interval not displayed. Ca/Mg/Phos:   Recent Labs     11/26/22  1609 11/27/22  0252 11/28/22  0403   CALCIUM 9.9 10.0 10.2   PHOS 1.9*  --   --        Hepatic:   Recent Labs     11/26/22  1055 11/27/22  0252 11/28/22  0403   * 66* 301*   ALT 51* 39 109*   BILITOT 1.8* 1.4* 1.5*   ALKPHOS 106 88 82       Troponin: No results for input(s): TROPONINI in the last 72 hours. BNP: No results for input(s): BNP in the last 72 hours. Lipids: No results for input(s): CHOL, TRIG, HDL, LDLCALC, LABVLDL in the last 72 hours. ABGs: No results for input(s): PHART, PO2ART, AUV7UTV in the last 72 hours. INR:   Recent Labs     11/26/22  1055   INR 1.03       UA:  Recent Labs     11/26/22 1815   COLORU Yellow   CLARITYU Clear   GLUCOSEU 100*   BILIRUBINUR SMALL*   KETUA >=80*   SPECGRAV 1.025   BLOODU Negative   PHUR 6.0   PROTEINU 30*   UROBILINOGEN 2.0*   NITRU Negative   LEUKOCYTESUR Negative   LABMICR YES   URINETYPE Voided        Urine Microscopic:   Recent Labs     11/26/22 1815   BACTERIA 1+*   HYALCAST 0-2   WBCUA 10-20*   RBCUA 3-4   EPIU 21-50*       Urine Culture:   Recent Labs     11/26/22  1900   LABURIN <50,000 CFU/ml mixed skin/urogenital jose manuel. No further workup     Urine Chemistry: No results for input(s): CLUR, LABCREA, PROTEINUR, NAUR in the last 72 hours. IMAGING:  MRI CERVICAL SPINE WO CONTRAST   Final Result   HEAD:   No acute intracranial abnormality. Mild atrophy and chronic small vessel ischemic change. CERVICAL SPINE:   Mild cervical spondylosis and facet arthropathy. No significant central canal stenosis without cord compression. Moderate bilateral C4 and right C7 foraminal stenosis. Other areas of lesser foraminal stenosis as detailed.       MRI BRAIN WO CONTRAST   Final Result HEAD:   No acute intracranial abnormality. Mild atrophy and chronic small vessel ischemic change. CERVICAL SPINE:   Mild cervical spondylosis and facet arthropathy. No significant central canal stenosis without cord compression. Moderate bilateral C4 and right C7 foraminal stenosis. Other areas of lesser foraminal stenosis as detailed. XR CHEST PORTABLE   Final Result      Stable elevation right hemidiaphragm with minor atelectatic changes right lower lung. Mild vascular redistribution. CT HEAD WO CONTRAST   Final Result   1. No evidence for acute intracranial process. No bleed or shift   2. Frontal temporal atrophy   3. Mild periventricular chronic encephalopathy          Assessment/Plan   Hyponatremia     2. Alc use     3. Anemia    4. Acid- base/ Electrolyte imbalance     5.  Alc withdrawal     Plan   - Ur studies - reviewed   - PO urea   - inc solute intake   - free water restriction   - d/c IVF   - Monitor Na       Recommend to dose adjust all medications  based on renal functions  Maintain SBP> 90 mmHg   Daily weights   AVOID NSAIDs  Avoid Nephrotoxins  Monitor Intake/Output  Call if significant decrease in urine output                   Thank you for allowing us to participate in care of Vincent Oliver MD  Feel free to contact me   [facility] [address]  Office : [phone]  Fax :[phone]

## 2022-11-28 NOTE — PROGRESS NOTES
Pt able to work with PT/OT. After working with PT/OT Pt was also able to complete 1-full lap around unit with front wheel walker. RN was just needed for help standing up. Pt to get MRI of thoracic/lumbar spine today. Talked with Neuro team PRN medications ordered. Perfect serve Message to Dr. Diop Mom, Pt and  brought up Pt having pain under ribs and across abdomen. Also mentioned she was told she had a mass there from a previous hospital admission.    Lucero Dominguez RN
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
## 2022-11-28 NOTE — PROGRESS NOTES
Speech Language Pathology  Facility/Department: [address] [place] [address] ICU   CLINICAL BEDSIDE SWALLOW EVALUATION/Discharge    NAME: Cherie Yang  : 1960  MRN: [id number]    ADMISSION DATE: 2022  ADMITTING DIAGNOSIS: has Alcohol abuse; Alcoholism ([facility] [address] Alcohol withdrawal syndrome with complication ([facility] [address] Hyponatremia; Tremors of nervous system; Electrolyte imbalance; and Secondary hypertension on their problem list.  ONSET DATE: 22    Recent Chest Xray/CT of Chest:   Impression       Stable elevation right hemidiaphragm with minor atelectatic changes right lower lung. Mild vascular redistribution. Recent MRI Brain:   Impression   HEAD:   No acute intracranial abnormality. Mild atrophy and chronic small vessel ischemic change. CERVICAL SPINE:   Mild cervical spondylosis and facet arthropathy. No significant central canal stenosis without cord compression. Moderate bilateral C4 and right C7 foraminal stenosis. Other areas of lesser foraminal stenosis as detailed. Date of Eval: 2022  Evaluating Therapist: ABRAHAM Clark    Current Diet level:  Current Diet : Regular      Primary Complaint  Patient Complaint: none    Pain:  Pain Assessment  Pain Assessment: 0-10  Pain Level: 0  Patient's Stated Pain Goal: 2  Pain Location: Generalized  Pain Descriptors: Aching    Reason for Referral  Cherie Yang was referred for a bedside swallow evaluation to assess the efficiency of her swallow function, identify signs and symptoms of aspiration and make recommendations regarding safe dietary consistencies, effective compensatory strategies, and safe eating environment. Impression  Dysphagia Diagnosis: Swallow function appears WFL  Dysphagia Impression : Pt presents with swallow function WFL. Pt seated upright in bed and readily self fed all trials of thin liquids, puree and regular solids.  Pt demonstrated adequate mastication for complete oral clearance. Pt appeared to swallow all trials with no overt s/s penetration/aspiration. Pt reported tolerating current diet level with no problems. Based on assessment results, SLP recommend continue current diet of Regular Solids and Thin Liquids. No additional SLP services warranted at this time. Please re-consult as needed/appropriate. Dysphagia Outcome Severity Scale: Level 7: Normal in all situations     Treatment Plan  Requires SLP Intervention: No     D/C Recommendations: No follow up therapy recommended post discharge       Recommended Diet and Intervention  Recommended Diet: Regular Solids, Thin Liquids  Recommended Form of Meds: PO    Compensatory Swallowing Strategies  Compensatory Swallowing Strategies : Upright as possible for all oral intake; Alternate solids and liquids    General  Chart Reviewed: Yes  Comments: Pt presented to hospital 11/26/22 s/p several days of weakness, imbalance, and falls - w/u revealed hyponatremia. PMHx significant for alcohol abuse, CHF, and cervical dystonia. Subjective  Subjective: Pt seated upright in bed agreeable to SLP evaluation at this time.  present at bedside. Behavior/Cognition: Alert; Cooperative;Pleasant mood  Respiratory Status: Room air  O2 Device: None (Room air)  Communication Observation: Functional  Follows Directions: Complex  Dentition: Adequate  Patient Positioning: Upright in bed  Baseline Vocal Quality: Normal  Volitional Cough: Strong  Prior Dysphagia History: none  Consistencies Administered: Thin - cup; Thin - straw;Pureed;Regular      Vision/Hearing  Vision  Vision: Within Functional Limits  Vision Exceptions: Wears glasses at all times; Wears glasses for reading  Hearing  Hearing: Within functional limits    Oral Motor Deficits  Oral/Motor  Oral Hygiene: Moist;Clean    Oral Phase Dysfunction  Oral Phase  Oral Phase: WNL     Indicators of Pharyngeal Phase Dysfunction   Pharyngeal Phase   Pharyngeal Phase: WFL    Prognosis  Individuals consulted  Consulted and agree with results and recommendations: Patient;RN;Family member  Family member consulted:   RN Name: Geo Minaya    Education  Patient Education: Discussed rationale for evaluation, results and recommendations. Patient Education Response: Verbalizes understanding;Demonstrated understanding  Safety Devices in place: Yes  Type of devices: All fall risk precautions in place; Bed alarm in place;Call light within reach; Left in bed; Other (comment) (PT/OT present)         Plan:  Discharge from SLP services. No additional needs at this time. Please re-consult as needed/appropriate. Recommended diet: Regular Solids, Thin Liquids  Discharge Plan: To be determined closer to discharge  Discussed with RN: Geo Minaya  Needs within reach.      Therapy Time  SLP Individual Minutes  Time In: 1130  Time Out: 3546  Minutes: 15     SLP Total Treatment Time  Total Treatment Time: 15      Electronically signed by:  Jacey Rios M.A., [address] [place] [address]  Speech-Language Pathologist  Pg #: [phone]

## 2022-11-29 LAB
ALBUMIN SERPL-MCNC: 4.1 G/DL (ref 3.4–5)
ANION GAP SERPL CALCULATED.3IONS-SCNC: 14 MMOL/L (ref 3–16)
ANISOCYTOSIS: ABNORMAL
BASOPHILS ABSOLUTE: 0 K/UL (ref 0–0.2)
BASOPHILS RELATIVE PERCENT: 1.3 %
BUN BLDV-MCNC: 21 MG/DL (ref 7–20)
CALCIUM SERPL-MCNC: 10.2 MG/DL (ref 8.3–10.6)
CHLORIDE BLD-SCNC: 92 MMOL/L (ref 99–110)
CO2: 24 MMOL/L (ref 21–32)
CREAT SERPL-MCNC: <0.5 MG/DL (ref 0.6–1.2)
EOSINOPHILS ABSOLUTE: 0.1 K/UL (ref 0–0.6)
EOSINOPHILS RELATIVE PERCENT: 4.4 %
GFR SERPL CREATININE-BSD FRML MDRD: >60 ML/MIN/{1.73_M2}
GLUCOSE BLD-MCNC: 105 MG/DL (ref 70–99)
HCT VFR BLD CALC: 33.3 % (ref 36–48)
HEMOGLOBIN: 11.4 G/DL (ref 12–16)
LYMPHOCYTES ABSOLUTE: 1.3 K/UL (ref 1–5.1)
LYMPHOCYTES RELATIVE PERCENT: 37.8 %
MAGNESIUM: 1.6 MG/DL (ref 1.8–2.4)
MCH RBC QN AUTO: 32.3 PG (ref 26–34)
MCHC RBC AUTO-ENTMCNC: 34.1 G/DL (ref 31–36)
MCV RBC AUTO: 94.7 FL (ref 80–100)
MONOCYTES ABSOLUTE: 0.3 K/UL (ref 0–1.3)
MONOCYTES RELATIVE PERCENT: 7.7 %
NEUTROPHILS ABSOLUTE: 1.6 K/UL (ref 1.7–7.7)
NEUTROPHILS RELATIVE PERCENT: 48.8 %
PDW BLD-RTO: 17.2 % (ref 12.4–15.4)
PHOSPHORUS: 2.2 MG/DL (ref 2.5–4.9)
PLATELET # BLD: ABNORMAL K/UL (ref 135–450)
PLATELET SLIDE REVIEW: ABNORMAL
PMV BLD AUTO: ABNORMAL FL (ref 5–10.5)
POTASSIUM SERPL-SCNC: 3.9 MMOL/L (ref 3.5–5.1)
RBC # BLD: 3.51 M/UL (ref 4–5.2)
SODIUM BLD-SCNC: 128 MMOL/L (ref 136–145)
SODIUM BLD-SCNC: 130 MMOL/L (ref 136–145)
WBC # BLD: 3.3 K/UL (ref 4–11)

## 2022-11-29 PROCEDURE — 84295 ASSAY OF SERUM SODIUM: CPT

## 2022-11-29 PROCEDURE — 6370000000 HC RX 637 (ALT 250 FOR IP): Performed by: STUDENT IN AN ORGANIZED HEALTH CARE EDUCATION/TRAINING PROGRAM

## 2022-11-29 PROCEDURE — 6360000002 HC RX W HCPCS

## 2022-11-29 PROCEDURE — 1200000000 HC SEMI PRIVATE

## 2022-11-29 PROCEDURE — 6360000002 HC RX W HCPCS: Performed by: STUDENT IN AN ORGANIZED HEALTH CARE EDUCATION/TRAINING PROGRAM

## 2022-11-29 PROCEDURE — 97116 GAIT TRAINING THERAPY: CPT

## 2022-11-29 PROCEDURE — 97530 THERAPEUTIC ACTIVITIES: CPT

## 2022-11-29 PROCEDURE — 97110 THERAPEUTIC EXERCISES: CPT

## 2022-11-29 PROCEDURE — 6370000000 HC RX 637 (ALT 250 FOR IP)

## 2022-11-29 PROCEDURE — 36415 COLL VENOUS BLD VENIPUNCTURE: CPT

## 2022-11-29 PROCEDURE — 99232 SBSQ HOSP IP/OBS MODERATE 35: CPT | Performed by: NURSE PRACTITIONER

## 2022-11-29 PROCEDURE — 6370000000 HC RX 637 (ALT 250 FOR IP): Performed by: INTERNAL MEDICINE

## 2022-11-29 PROCEDURE — 80069 RENAL FUNCTION PANEL: CPT

## 2022-11-29 PROCEDURE — 83735 ASSAY OF MAGNESIUM: CPT

## 2022-11-29 PROCEDURE — 6370000000 HC RX 637 (ALT 250 FOR IP): Performed by: HOSPITALIST

## 2022-11-29 PROCEDURE — 85025 COMPLETE CBC W/AUTO DIFF WBC: CPT

## 2022-11-29 PROCEDURE — 2580000003 HC RX 258: Performed by: STUDENT IN AN ORGANIZED HEALTH CARE EDUCATION/TRAINING PROGRAM

## 2022-11-29 RX ORDER — DIPHENHYDRAMINE HCL 25 MG
25 TABLET ORAL ONCE
Status: COMPLETED | OUTPATIENT
Start: 2022-11-29 | End: 2022-11-29

## 2022-11-29 RX ORDER — LIDOCAINE 4 G/G
1 PATCH TOPICAL DAILY
Qty: 60 EACH | Refills: 2 | Status: SHIPPED | OUTPATIENT
Start: 2022-11-29

## 2022-11-29 RX ORDER — MAGNESIUM SULFATE IN WATER 40 MG/ML
2000 INJECTION, SOLUTION INTRAVENOUS ONCE
Status: COMPLETED | OUTPATIENT
Start: 2022-11-29 | End: 2022-11-29

## 2022-11-29 RX ORDER — LIDOCAINE 4 G/G
1 PATCH TOPICAL DAILY
Status: DISCONTINUED | OUTPATIENT
Start: 2022-11-29 | End: 2022-11-30 | Stop reason: HOSPADM

## 2022-11-29 RX ADMIN — LORAZEPAM 2 MG: 1 TABLET ORAL at 02:50

## 2022-11-29 RX ADMIN — ASPIRIN 81 MG: 81 TABLET, COATED ORAL at 10:20

## 2022-11-29 RX ADMIN — SODIUM CHLORIDE, PRESERVATIVE FREE 10 ML: 5 INJECTION INTRAVENOUS at 10:14

## 2022-11-29 RX ADMIN — THIAMINE HCL TAB 100 MG 100 MG: 100 TAB at 10:20

## 2022-11-29 RX ADMIN — Medication 30 G: at 10:14

## 2022-11-29 RX ADMIN — DIPHENHYDRAMINE HYDROCHLORIDE 25 MG: 25 TABLET ORAL at 01:00

## 2022-11-29 RX ADMIN — LORAZEPAM 1 MG: 1 TABLET ORAL at 21:33

## 2022-11-29 RX ADMIN — ZOLPIDEM TARTRATE 10 MG: 5 TABLET ORAL at 21:16

## 2022-11-29 RX ADMIN — MAGNESIUM SULFATE HEPTAHYDRATE 2000 MG: 40 INJECTION, SOLUTION INTRAVENOUS at 08:18

## 2022-11-29 RX ADMIN — LORAZEPAM 2 MG: 1 TABLET ORAL at 15:06

## 2022-11-29 RX ADMIN — ENOXAPARIN SODIUM 40 MG: 100 INJECTION SUBCUTANEOUS at 10:20

## 2022-11-29 RX ADMIN — SODIUM CHLORIDE, PRESERVATIVE FREE 10 ML: 5 INJECTION INTRAVENOUS at 21:16

## 2022-11-29 RX ADMIN — LORAZEPAM 2 MG: 1 TABLET ORAL at 00:26

## 2022-11-29 ASSESSMENT — PAIN DESCRIPTION - LOCATION
LOCATION: NECK
LOCATION: HEAD

## 2022-11-29 ASSESSMENT — PAIN SCALES - GENERAL
PAINLEVEL_OUTOF10: 4
PAINLEVEL_OUTOF10: 4

## 2022-11-29 ASSESSMENT — PAIN DESCRIPTION - ONSET: ONSET: ON-GOING

## 2022-11-29 ASSESSMENT — PAIN - FUNCTIONAL ASSESSMENT: PAIN_FUNCTIONAL_ASSESSMENT: ACTIVITIES ARE NOT PREVENTED

## 2022-11-29 ASSESSMENT — PAIN DESCRIPTION - FREQUENCY: FREQUENCY: CONTINUOUS

## 2022-11-29 ASSESSMENT — PAIN DESCRIPTION - PAIN TYPE: TYPE: CHRONIC PAIN

## 2022-11-29 ASSESSMENT — PAIN DESCRIPTION - DESCRIPTORS: DESCRIPTORS: ACHING
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
## 2022-11-29 NOTE — PROGRESS NOTES
Nephrology Note                                                                                                                                                                                                                                                                                                                                                               Office : [phone]     Fax :[phone]              Patient's Name: Mason Schreiber  11:05 AM  11/29/2022    Reason for Consult:  hyponatremia   Requesting Physician:  Priya Remy better   On oral diet now   Solute intake better       Past Medical History:   Diagnosis Date    C. difficile diarrhea 05/15/2021    CAD (coronary artery disease)     Cervical dystonia     CHF (congestive heart failure) ([facility] [address]     Dental crown present     upper right and bonding    Fatty liver     Hyperlipidemia     Hypertension     Lichen sclerosus     PONV (postoperative nausea and vomiting)        Past Surgical History:   Procedure Laterality Date    BREAST SURGERY      COLONOSCOPY      COLONOSCOPY N/A 7/2/2019    COLONOSCOPY WITH BIOPSY performed by Vanessa Dietz MD at [address]  7/2/2019    COLONOSCOPY POLYPECTOMY SNARE/COLD BIOPSY performed by Vanessa Dietz MD at [address] [facility], COLON, DIAGNOSTIC         Family History   Problem Relation Age of Onset    Breast Cancer Mother 76        reports that she has never smoked. She has never used smokeless tobacco. She reports current alcohol use of about 3.0 standard drinks per week. She reports that she does not use drugs.     Allergies:  Atorvastatin, Iodine, Pravastatin, Rosuvastatin, Shellfish allergy, Sulfa antibiotics, Tylenol [acetaminophen], and Lunesta [eszopiclone]    Current Medications:    urea (URE-NA) packet 30 g, Daily  LORazepam (ATIVAN) tablet 1 mg, Q1H PRN   Or  LORazepam (ATIVAN) 2 MG/ML concentrated solution 1 mg, Q1H PRN --  92*   CO2 27  --  26  --   --   --  24   BUN 26*  --  16  --   --   --  21*   CREATININE 0.6  --  <0.5*  --   --   --  <0.5*   GLUCOSE 112*  --  103*  --   --   --  105*    < > = values in this interval not displayed. Ca/Mg/Phos:   Recent Labs     11/26/22  1609 11/27/22  0252 11/28/22  0403 11/29/22  0458   CALCIUM 9.9 10.0 10.2 10.2   MG  --   --   --  1.60*   PHOS 1.9*  --   --  2.2*       Hepatic:   Recent Labs     11/27/22 0252 11/28/22  0403   AST 66* 301*   ALT 39 109*   BILITOT 1.4* 1.5*   ALKPHOS 88 82       Troponin: No results for input(s): TROPONINI in the last 72 hours. BNP: No results for input(s): BNP in the last 72 hours. Lipids: No results for input(s): CHOL, TRIG, HDL, LDLCALC, LABVLDL in the last 72 hours. ABGs: No results for input(s): PHART, PO2ART, PYH0YOU in the last 72 hours. INR:   No results for input(s): INR in the last 72 hours. UA:  Recent Labs     11/26/22 1815   COLORU Yellow   CLARITYU Clear   GLUCOSEU 100*   BILIRUBINUR SMALL*   KETUA >=80*   SPECGRAV 1.025   BLOODU Negative   PHUR 6.0   PROTEINU 30*   UROBILINOGEN 2.0*   NITRU Negative   LEUKOCYTESUR Negative   LABMICR YES   URINETYPE Voided        Urine Microscopic:   Recent Labs     11/26/22 1815   BACTERIA 1+*   HYALCAST 0-2   WBCUA 10-20*   RBCUA 3-4   EPIU 21-50*       Urine Culture:   Recent Labs     11/26/22  1900   LABURIN <50,000 CFU/ml mixed skin/urogenital jose manuel. No further workup       Urine Chemistry: No results for input(s): CLUR, LABCREA, PROTEINUR, NAUR in the last 72 hours. IMAGING:  MRI LUMBAR SPINE WO CONTRAST   Final Result   THORACIC SPINE:   Minimal thoracic spondylosis and facet arthropathy, causing moderate right T4, and mild right T5 foraminal stenosis. Otherwise unremarkable MRI of the thoracic spine. LUMBAR SPINE:   Mild lumbar spondylosis and facet arthropathy as detailed. Mild central canal stenosis at L2-L3.  No other central canal stenosis or evidence of foraminal stenosis. MRI THORACIC SPINE WO CONTRAST   Final Result   THORACIC SPINE:   Minimal thoracic spondylosis and facet arthropathy, causing moderate right T4, and mild right T5 foraminal stenosis. Otherwise unremarkable MRI of the thoracic spine. LUMBAR SPINE:   Mild lumbar spondylosis and facet arthropathy as detailed. Mild central canal stenosis at L2-L3. No other central canal stenosis or evidence of foraminal stenosis. MRI CERVICAL SPINE WO CONTRAST   Final Result   HEAD:   No acute intracranial abnormality. Mild atrophy and chronic small vessel ischemic change. CERVICAL SPINE:   Mild cervical spondylosis and facet arthropathy. No significant central canal stenosis without cord compression. Moderate bilateral C4 and right C7 foraminal stenosis. Other areas of lesser foraminal stenosis as detailed. MRI BRAIN WO CONTRAST   Final Result   HEAD:   No acute intracranial abnormality. Mild atrophy and chronic small vessel ischemic change. CERVICAL SPINE:   Mild cervical spondylosis and facet arthropathy. No significant central canal stenosis without cord compression. Moderate bilateral C4 and right C7 foraminal stenosis. Other areas of lesser foraminal stenosis as detailed. XR CHEST PORTABLE   Final Result      Stable elevation right hemidiaphragm with minor atelectatic changes right lower lung. Mild vascular redistribution. CT HEAD WO CONTRAST   Final Result   1. No evidence for acute intracranial process. No bleed or shift   2. Frontal temporal atrophy   3. Mild periventricular chronic encephalopathy          Assessment/Plan   Hyponatremia     2. Alc use     3. Anemia    4. Acid- base/ Electrolyte imbalance     5.  Alc withdrawal     Plan   - Ur studies - reviewed   - PO urea as needed   - inc solute intake   - free water restriction   - d/c IVF   - Monitor Na       Recommend to dose adjust all medications  based on renal functions  Maintain SBP> 90 mmHg   Daily weights   AVOID NSAIDs  Avoid Nephrotoxins  Monitor Intake/Output  Call if significant decrease in urine output                   Thank you for allowing us to participate in care of Trinity Rod MD  Feel free to contact me   [facility] [address]  Office : [phone]  Fax :[phone]
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
## 2022-11-29 NOTE — PROGRESS NOTES
Physical Therapy  Facility/Department: [facility] ICU  Daily Treatment Note  NAME: Sandy Michaels  : 1960  MRN: [id number]    Date of Service: 2022    Discharge Recommendations:    Sandy Michaels scored a 18/24 on the AM-PAC short mobility form. Current research shows that an AM-PAC score of 18 or greater is typically associated with a discharge to the patient's home setting. Based on the patient's AM-PAC score and their current functional mobility deficits, it is recommended that the patient have 2-3 sessions per week of Physical Therapy at d/c to increase the patient's independence. At this time, this patient demonstrates the endurance and safety to discharge home with home services and a follow up treatment frequency of 2-3x/wk. Please see assessment section for further patient specific details. If patient discharges prior to next session this note will serve as a discharge summary. Please see below for the latest assessment towards goals. PT Equipment Recommendations  Equipment Needed: No    Patient Diagnosis(es): The primary encounter diagnosis was Hyponatremia. A diagnosis of Alcohol withdrawal syndrome with complication [facility]) was also pertinent to this visit. Assessment   Assessment: Pt progressing with mobility & endurance. Requiring up to CGA & use of RW for ambulation & CGA to min A on stairs. Anticipate pt will go home upon D/C. Recommend 24-hr assistance & home PT. Will follow per plan of care. Activity Tolerance: Patient tolerated treatment well  Equipment Needed: No     Plan    Physcial Therapy Plan  General Plan:  (2-5)  Current Treatment Recommendations: Strengthening;Patient/Caregiver education & training; Therapeutic activities;Balance training;Gait training;Stair training;Functional mobility training;Transfer training; Endurance training; Safety education & training     Restrictions  Position Activity Restriction  Other position/activity restrictions: Up as tolerated Subjective    Subjective  Subjective: Pt supine in bed & agreeable to PT. Pain: pt c/o low back pain, not rated. RN in to place Lidocaine patch     Objective      Bed Mobility Training  Bed Mobility Training: Yes  Supine to Sit: Supervision (HOB elevated)  Sit to Supine: Supervision (HOB elevated)  Scooting: Supervision (seated & supine using rails)  Balance  Sitting: Intact  Standing:  (static:SBA/SUPV with RW. dynamic: CGA/SBA with RW)  Transfer Training  Transfer Training: Yes  Sit to Stand: Contact-guard assistance;Stand-by assistance (from bed, chair (effortful))  Stand to Sit: Stand-by assistance  Gait Training  Gait Training: Yes  Gait  Overall Level of Assistance: Contact-guard assistance;Stand-by assistance (pt amb 400 ft with RW CGA/SBA. slow & guarded. no LOB. pt mildly tremulous in bilat UEs, c/o LEs feeling \"shaky\". )  Rail Use: Right  Stairs - Level of Assistance: Minimum assistance;Contact-guard assistance (step-to pattern leading up with left LE & down with right. pt using bilat UE support on right rail for ascent & left rail & HHA on right for descent.)  Number of Stairs Trained: 12     PT Exercises  Exercise Treatment: seated x 20 bilat: marching, LAQ, ankle DF/PF  Other Specialty Interventions  Other Treatments/Modalities: sit<->stand x 7 from chair, x 3 from bed. Safety Devices  Type of Devices: Call light within reach; Bed alarm in place; Left in bed;Nurse notified       Goals  Short Term Goals  Time Frame for Short Term Goals: Discharge  Short Term Goal 1: Sit<>stand Mod I  Short Term Goal 2: Ambulate 200ft with LRAD SBA  Short Term Goal 3: Undergo a stair assessment  Patient Goals   Patient Goals : To Return Home    Education  Patient Education  Education Given To: Patient; Family  Education Provided: Plan of Care;Transfer Training  Education Provided Comments: stairs (pt &  educated)  Education Method: Demonstration;Verbal  Education Outcome: Verbalized understanding;Demonstrated
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
## 2022-11-29 NOTE — CARE COORDINATION
Case Management Assessment            Discharge Note                    Date / Time of Note: 11/29/2022 11:01 AM                  Discharge Note Completed by: Emmett Trevizo RN    Patient Name: Jhon Maradiaga   YOB: 1960  Diagnosis: Hyponatremia [E87.1]  Alcohol withdrawal syndrome with complication [facility]) [V85.870]   Date / Time: 11/26/2022 10:16 AM    Current PCP: Jenny Mendosa    Advance Directives:  Code Status: Limited    Financial:  Payor: Venus Mejia / Plan: [employer]  / Product Type: *No Product type* /      Pharmacy:    [id number], [address] [place] [address] [phone] - F [phone]  [address] [place] [address]  Phone: [phone] Fax: [phone]      Assistance purchasing medications?: Potential Assistance Purchasing Medications: No    Does patient want to participate in local refill/ meds to beds program?: Yes    Meds To Beds General Rules:  1. Can ONLY be done Monday- Friday between 8:30am-5pm  2. Prescription(s) must be in pharmacy by 3pm to be filled same day  3. Copy of patient's insurance/ prescription drug card and patient face sheet must be sent along with the prescription(s)  4. Cost of Rx cannot be added to hospital bill. If financial assistance is needed, please contact unit  or ;  or  CANNOT provide pharmacy voucher for patients co-pays  5.  Patients can then  the prescription on their way out of the hospital at discharge, or pharmacy can deliver to the bedside if staff is available. (payment due at time of pick-up or delivery - cash, check, or card accepted)     Able to afford home medications/ co-pay costs: Yes    ADLS:  Current PT AM-PAC Score: 18 /24  Current OT AM-PAC Score: 19 /24      DISCHARGE Disposition: Home with [address]: [facility] referral     EMILEE Completed: Yes    IMM Completed:   Not Indicated    Transportation:  Transportation PLAN for discharge: family   Mode of Transport: [address] ordered at discharge: Yes  [address]: [facility]  Address: Evin Mcadams, [address] [place] [address]  Phone: [phone]    Orders faxed: Yes    The Patient and/or patient representative Elyse Chisholm and her family were provided with a choice of provider and agrees with the discharge plan Yes    Freedom of choice list was provided with basic dialogue that supports the patient's individualized plan of care/goals and shares the quality data associated with the providers.  Yes      Favio Vasquez RN  [facility]  Case Management Department  [phone]

## 2022-11-29 NOTE — CARE COORDINATION
CTN contacted Sandi with [email or web address] [phone]. They have accepted this patient and will pull referral from [facility].  They will contact patient and make arrangements for [facility] by 12/  Electronically signed by Yadira Nelson LPN on 88/39/2045 at 11:24 AM

## 2022-11-29 NOTE — DISCHARGE INSTR - DIET

## 2022-11-29 NOTE — PLAN OF CARE
Problem: Skin/Tissue Integrity  Goal: Absence of new skin breakdown  Description: 1. Monitor for areas of redness and/or skin breakdown  2. Assess vascular access sites hourly  3. Every 4-6 hours minimum:  Change oxygen saturation probe site  4. Every 4-6 hours:  If on nasal continuous positive airway pressure, respiratory therapy assess nares and determine need for appliance change or resting period.   Outcome: Progressing     Problem: Chronic Conditions and Co-morbidities  Goal: Patient's chronic conditions and co-morbidity symptoms are monitored and maintained or improved  Outcome: Progressing  Flowsheets (Taken 11/28/2022 2000)  Care Plan - Patient's Chronic Conditions and Co-Morbidity Symptoms are Monitored and Maintained or Improved: Monitor and assess patient's chronic conditions and comorbid symptoms for stability, deterioration, or improvement

## 2022-11-29 NOTE — DISCHARGE INSTRUCTIONS
Please make appointment to follow-up with your family doctor (Dr. Talon Alonso) in 1 week  Please make an appointment to follow-up with nephrology (Dr. Etelvina Herrera) in 2 weeks  Please make an appointment to follow-up with your neurologist Dr. Ely Parry and 1 week. Please note, you will start taking thiamine as a new prescription upon leaving the hospital, this will be provided to you before leaving the hospital  For a full list of medications that you will continue take, take a different dosage, discontinue taking please refer to your med list  Additionally, you will now have home health care assist you with your daily needs at home, providing you physical therapy and Occupational Therapy. Details can be found below. [facility] will provide a physical therapist and an occupational therapist to work with you at your residence. You will hear from a  within 24-48h after returning home.     [facility]  Address: [facility], [address] [place] [address]  Phone: [phone]

## 2022-11-29 NOTE — PLAN OF CARE
Problem: Discharge Planning  Goal: Discharge to home or other facility with appropriate resources  Outcome: Progressing     Problem: Pain  Goal: Verbalizes/displays adequate comfort level or baseline comfort level  Outcome: Progressing  Flowsheets (Taken 11/29/2022 0400 by Bradford Daniel RN)  Verbalizes/displays adequate comfort level or baseline comfort level: Encourage patient to monitor pain and request assistance     Problem: Skin/Tissue Integrity  Goal: Absence of new skin breakdown  Description: 1. Monitor for areas of redness and/or skin breakdown  2. Assess vascular access sites hourly  3. Every 4-6 hours minimum:  Change oxygen saturation probe site  4. Every 4-6 hours:  If on nasal continuous positive airway pressure, respiratory therapy assess nares and determine need for appliance change or resting period.   11/29/2022 0732 by Analy Kirkpatrick RN  Outcome: Progressing  11/29/2022 0339 by Bradford Daniel RN  Outcome: Progressing     Problem: Safety - Adult  Goal: Free from fall injury  Outcome: Progressing     Problem: ABCDS Injury Assessment  Goal: Absence of physical injury  Outcome: Progressing     Problem: Chronic Conditions and Co-morbidities  Goal: Patient's chronic conditions and co-morbidity symptoms are monitored and maintained or improved  11/29/2022 0732 by Analy Kirkpatrick RN  Outcome: Progressing  11/29/2022 0339 by Bradford Daniel RN  Outcome: Progressing  [facility] (Taken 11/28/2022 2000)  Care Plan - Patient's Chronic Conditions and Co-Morbidity Symptoms are Monitored and Maintained or Improved: Monitor and assess patient's chronic conditions and comorbid symptoms for stability, deterioration, or improvement

## 2022-11-30 VITALS
DIASTOLIC BLOOD PRESSURE: 72 MMHG | OXYGEN SATURATION: 95 % | TEMPERATURE: 98.2 F | HEART RATE: 64 BPM | WEIGHT: 221.56 LBS | RESPIRATION RATE: 19 BRPM | SYSTOLIC BLOOD PRESSURE: 127 MMHG | BODY MASS INDEX: 31.79 KG/M2

## 2022-11-30 LAB
ANION GAP SERPL CALCULATED.3IONS-SCNC: 12 MMOL/L (ref 3–16)
ANISOCYTOSIS: ABNORMAL
BASOPHILS ABSOLUTE: 0 K/UL (ref 0–0.2)
BASOPHILS RELATIVE PERCENT: 0 %
BUN BLDV-MCNC: 17 MG/DL (ref 7–20)
CALCIUM SERPL-MCNC: 10.5 MG/DL (ref 8.3–10.6)
CHLORIDE BLD-SCNC: 93 MMOL/L (ref 99–110)
CO2: 26 MMOL/L (ref 21–32)
CREAT SERPL-MCNC: <0.5 MG/DL (ref 0.6–1.2)
EOSINOPHILS ABSOLUTE: 0.1 K/UL (ref 0–0.6)
EOSINOPHILS RELATIVE PERCENT: 3 %
GFR SERPL CREATININE-BSD FRML MDRD: >60 ML/MIN/{1.73_M2}
GLUCOSE BLD-MCNC: 133 MG/DL (ref 70–99)
HCT VFR BLD CALC: 36.8 % (ref 36–48)
HEMOGLOBIN: 12.5 G/DL (ref 12–16)
LYMPHOCYTES ABSOLUTE: 1.4 K/UL (ref 1–5.1)
LYMPHOCYTES RELATIVE PERCENT: 46 %
MAGNESIUM: 1.7 MG/DL (ref 1.8–2.4)
MCH RBC QN AUTO: 32.6 PG (ref 26–34)
MCHC RBC AUTO-ENTMCNC: 33.9 G/DL (ref 31–36)
MCV RBC AUTO: 96.1 FL (ref 80–100)
MONOCYTES ABSOLUTE: 0.2 K/UL (ref 0–1.3)
MONOCYTES RELATIVE PERCENT: 7 %
NEUTROPHILS ABSOLUTE: 1.3 K/UL (ref 1.7–7.7)
NEUTROPHILS RELATIVE PERCENT: 44 %
PDW BLD-RTO: 17.3 % (ref 12.4–15.4)
PLATELET # BLD: 95 K/UL (ref 135–450)
PMV BLD AUTO: 9.1 FL (ref 5–10.5)
POTASSIUM SERPL-SCNC: 3.5 MMOL/L (ref 3.5–5.1)
RBC # BLD: 3.82 M/UL (ref 4–5.2)
SODIUM BLD-SCNC: 131 MMOL/L (ref 136–145)
SODIUM BLD-SCNC: 131 MMOL/L (ref 136–145)
WBC # BLD: 3 K/UL (ref 4–11)

## 2022-11-30 PROCEDURE — 6360000002 HC RX W HCPCS

## 2022-11-30 PROCEDURE — 36415 COLL VENOUS BLD VENIPUNCTURE: CPT

## 2022-11-30 PROCEDURE — 80048 BASIC METABOLIC PNL TOTAL CA: CPT

## 2022-11-30 PROCEDURE — 6360000002 HC RX W HCPCS: Performed by: INTERNAL MEDICINE

## 2022-11-30 PROCEDURE — 6370000000 HC RX 637 (ALT 250 FOR IP): Performed by: STUDENT IN AN ORGANIZED HEALTH CARE EDUCATION/TRAINING PROGRAM

## 2022-11-30 PROCEDURE — 90686 IIV4 VACC NO PRSV 0.5 ML IM: CPT | Performed by: INTERNAL MEDICINE

## 2022-11-30 PROCEDURE — 84295 ASSAY OF SERUM SODIUM: CPT

## 2022-11-30 PROCEDURE — 83735 ASSAY OF MAGNESIUM: CPT

## 2022-11-30 PROCEDURE — 85025 COMPLETE CBC W/AUTO DIFF WBC: CPT

## 2022-11-30 PROCEDURE — G0008 ADMIN INFLUENZA VIRUS VAC: HCPCS | Performed by: INTERNAL MEDICINE

## 2022-11-30 PROCEDURE — 97116 GAIT TRAINING THERAPY: CPT

## 2022-11-30 PROCEDURE — 6370000000 HC RX 637 (ALT 250 FOR IP): Performed by: INTERNAL MEDICINE

## 2022-11-30 PROCEDURE — 6370000000 HC RX 637 (ALT 250 FOR IP)

## 2022-11-30 PROCEDURE — 97530 THERAPEUTIC ACTIVITIES: CPT

## 2022-11-30 RX ORDER — MAGNESIUM SULFATE IN WATER 40 MG/ML
2000 INJECTION, SOLUTION INTRAVENOUS ONCE
Status: DISCONTINUED | OUTPATIENT
Start: 2022-11-30 | End: 2022-11-30

## 2022-11-30 RX ORDER — LANOLIN ALCOHOL/MO/W.PET/CERES
400 CREAM (GRAM) TOPICAL DAILY
Qty: 30 TABLET | Refills: 2 | Status: SHIPPED | OUTPATIENT
Start: 2022-11-30

## 2022-11-30 RX ORDER — LANOLIN ALCOHOL/MO/W.PET/CERES
400 CREAM (GRAM) TOPICAL DAILY
Status: DISCONTINUED | OUTPATIENT
Start: 2022-11-30 | End: 2022-11-30 | Stop reason: HOSPADM

## 2022-11-30 RX ADMIN — LORAZEPAM 2 MG: 1 TABLET ORAL at 09:28

## 2022-11-30 RX ADMIN — MAGNESIUM SULFATE HEPTAHYDRATE 2000 MG: 40 INJECTION, SOLUTION INTRAVENOUS at 08:13

## 2022-11-30 RX ADMIN — ASPIRIN 81 MG: 81 TABLET, COATED ORAL at 08:15

## 2022-11-30 RX ADMIN — POTASSIUM BICARBONATE 40 MEQ: 782 TABLET, EFFERVESCENT ORAL at 09:41

## 2022-11-30 RX ADMIN — INFLUENZA A VIRUS A/VICTORIA/2570/2019 IVR-215 (H1N1) ANTIGEN (PROPIOLACTONE INACTIVATED), INFLUENZA A VIRUS A/DARWIN/6/2021 IVR-227 (H3N2) ANTIGEN (PROPIOLACTONE INACTIVATED), INFLUENZA B VIRUS B/AUSTRIA/1359417/2021 BVR-26 ANTIGEN (PROPIOLACTONE INACTIVATED), INFLUENZA B VIRUS B/PHUKET/3073/2013 BVR-1B ANTIGEN (PROPIOLACTONE INACTIVATED) 0.5 ML: 15; 15; 15; 15 INJECTION, SOLUTION INTRAMUSCULAR at 10:49

## 2022-11-30 RX ADMIN — Medication 30 G: at 09:44

## 2022-11-30 RX ADMIN — LORAZEPAM 2 MG: 1 TABLET ORAL at 04:30

## 2022-11-30 RX ADMIN — THIAMINE HCL TAB 100 MG 100 MG: 100 TAB at 08:15

## 2022-11-30 RX ADMIN — Medication 400 MG: at 10:12

## 2022-11-30 ASSESSMENT — PAIN DESCRIPTION - LOCATION
LOCATION: HAND
LOCATION: HEAD;NECK
LOCATION: HEAD

## 2022-11-30 ASSESSMENT — PAIN SCALES - GENERAL
PAINLEVEL_OUTOF10: 7
PAINLEVEL_OUTOF10: 6
PAINLEVEL_OUTOF10: 5

## 2022-11-30 NOTE — PLAN OF CARE
Problem: Discharge Planning  Goal: Discharge to home or other facility with appropriate resources  Outcome: Completed  Flowsheets (Taken 11/29/2022 2100)  Discharge to home or other facility with appropriate resources: Identify barriers to discharge with patient and caregiver     Problem: Pain  Goal: Verbalizes/displays adequate comfort level or baseline comfort level  Outcome: Adequate for Discharge     Problem: Skin/Tissue Integrity  Goal: Absence of new skin breakdown  Description: 1. Monitor for areas of redness and/or skin breakdown  2. Assess vascular access sites hourly  3. Every 4-6 hours minimum:  Change oxygen saturation probe site  4. Every 4-6 hours:  If on nasal continuous positive airway pressure, respiratory therapy assess nares and determine need for appliance change or resting period.   Outcome: Adequate for Discharge     Problem: Safety - Adult  Goal: Free from fall injury  Outcome: Adequate for Discharge  Flowsheets (Taken 11/29/2022 2000)  Free From Fall Injury: Instruct family/caregiver on patient safety     Problem: Chronic Conditions and Co-morbidities  Goal: Patient's chronic conditions and co-morbidity symptoms are monitored and maintained or improved  Outcome: Completed  Flowsheets (Taken 11/29/2022 2100)  Care Plan - Patient's Chronic Conditions and Co-Morbidity Symptoms are Monitored and Maintained or Improved: Monitor and assess patient's chronic conditions and comorbid symptoms for stability, deterioration, or improvement

## 2022-11-30 NOTE — PROGRESS NOTES
Physical Therapy  Facility/Department: [facility] ICU  Daily Treatment Note  NAME: Mason Schreiber  : 1960  MRN: [id number]    Date of Service: 2022    Discharge Recommendations:    Mason Schreiber scored a 18/24 on the AM-PAC short mobility form. Current research shows that an AM-PAC score of 18 or greater is typically associated with a discharge to the patient's home setting. Based on the patient's AM-PAC score and their current functional mobility deficits, it is recommended that the patient have 2-3 sessions per week of Physical Therapy at d/c to increase the patient's independence. At this time, this patient demonstrates the endurance and safety to discharge home with home services and a follow up treatment frequency of 2-3x/wk. Please see assessment section for further patient specific details. If patient discharges prior to next session this note will serve as a discharge summary. Please see below for the latest assessment towards goals. PT Equipment Recommendations  Equipment Needed: No    Patient Diagnosis(es): The primary encounter diagnosis was Hyponatremia. A diagnosis of Alcohol withdrawal syndrome with complication [facility]) was also pertinent to this visit. Assessment   Assessment: Pt continues to progress with mobility & endurance. Requiring SBA & use of RW for ambulation & CGA (+ HHA for descent) on stairs. Anticipate pt will go home upon D/C. Recommend 24-hr assistance & home PT. Will follow per plan of care. Activity Tolerance: Patient tolerated treatment well  Equipment Needed: No     Plan    Physcial Therapy Plan  General Plan:  (2-5)  Current Treatment Recommendations: Strengthening;Patient/Caregiver education & training; Therapeutic activities;Balance training;Gait training;Stair training;Functional mobility training;Transfer training; Endurance training; Safety education & training     Restrictions  Position Activity Restriction  Other position/activity restrictions: Up as tolerated     Subjective    Subjective  Subjective: Pt supine in bed & agreeable to PT. Objective      Bed Mobility Training  Bed Mobility Training: Yes  Sit to Supine: Supervision (HOB elevated)  Scooting: Supervision (seated)  Balance  Standing:  (static: SUPV with RW;  dynamic:SBA with RW)  Transfer Training  Transfer Training: Yes  Sit to Stand: Stand-by assistance (from bed, toilet using bar.)  Stand to Sit: Stand-by assistance (verbal  cues required)  Gait Training  Gait Training: Yes  Gait  Overall Level of Assistance: Stand-by assistance (pt amb 10 ft,  400 ft with RW SBA. slow & guarded, no LOB.)  Rail Use: Right  Stairs - Level of Assistance: Contact-guard assistance (step-to pattern. using right rail (bilat UE) ascending & left rail & HHA on right descending.)  Number of Stairs Trained: 12        Other Specialty Interventions  Other Treatments/Modalities: pt toileted with SBA. Safety Devices  Type of Devices: Call light within reach; Chair alarm in place; Left in chair;Nurse notified       Goals  Short Term Goals  Time Frame for Short Term Goals: Discharge  Short Term Goal 1: Sit<>stand Mod I  Short Term Goal 2: Ambulate 200ft with LRAD SBA  Short Term Goal 3: Undergo a stair assessment  Patient Goals   Patient Goals :  To Return Home    Education  Patient Education  Education Given To: Patient  Education Provided: Plan of Care;Transfer Training  Education Provided Comments: stairs (pt &  educated)  Education Method: Demonstration;Verbal  Education Outcome: Verbalized understanding;Demonstrated understanding    Therapy Time   Individual Concurrent Group Co-treatment   Time In 0929         Time Out 1001         Minutes [address], PT

## 2022-11-30 NOTE — PROGRESS NOTES
Patient discharged with , and personal items. IV lines removed, discharge education complete and all questions were answered. Meds to beds collected and given to patient. Patient taken to car by wheelchair. At discharge, patient breathing easy on room air, no signs of distress.

## 2022-11-30 NOTE — PROGRESS NOTES
A&Ox4, VSS, NAD. CIWA 10, Ativan and Ambien given per request.  Bed locked in the lowest position with bed alarm on, call light within reach. Try to avoid sleep interruption. D/c to home tomorrow.

## 2022-11-30 NOTE — PLAN OF CARE
Problem: Pain  Goal: Verbalizes/displays adequate comfort level or baseline comfort level  11/30/2022 1122 by Zuleyka Abbott RN  Outcome: Adequate for Discharge  11/30/2022 0045 by Anish Antunez RN  Outcome: Adequate for Discharge     Problem: Skin/Tissue Integrity  Goal: Absence of new skin breakdown  Description: 1. Monitor for areas of redness and/or skin breakdown  2. Assess vascular access sites hourly  3. Every 4-6 hours minimum:  Change oxygen saturation probe site  4. Every 4-6 hours:  If on nasal continuous positive airway pressure, respiratory therapy assess nares and determine need for appliance change or resting period.   11/30/2022 1122 by Zuleyka Abbott RN  Outcome: Adequate for Discharge  11/30/2022 0045 by Anish Antunez RN  Outcome: Adequate for Discharge     Problem: Safety - Adult  Goal: Free from fall injury  11/30/2022 1122 by Zuleyka Abbott RN  Outcome: Adequate for Discharge  11/30/2022 0045 by Anish Antunez RN  Outcome: Adequate for Discharge  Flowsheets (Taken 11/29/2022 2000)  Free From Fall Injury: Instruct family/caregiver on patient safety     Problem: ABCDS Injury Assessment  Goal: Absence of physical injury  11/30/2022 1122 by Zulekya Abbott RN  Outcome: Adequate for Discharge  11/30/2022 0045 by Anish Antunez RN  Outcome: Adequate for Discharge  Flowsheets (Taken 11/29/2022 2000)  Absence of Physical Injury: Implement safety measures based on patient assessment     Problem: Metabolic/Fluid and Electrolytes - Adult  Goal: Electrolytes maintained within normal limits  11/30/2022 1122 by Zuleyka Abbott RN  Outcome: Adequate for Discharge  11/30/2022 0045 by Anish Antunez RN  Outcome: Progressing  Goal: Hemodynamic stability and optimal renal function maintained  11/30/2022 1122 by Zuleyka Abbott RN  Outcome: Adequate for Discharge  11/30/2022 0045 by Anish Antunez RN  Outcome: Adequate for Discharge     Problem: Discharge Planning  Goal: Discharge to home or other facility with appropriate resources  11/30/2022 0045 by Brandie Muñoz RN  Outcome: Completed  Flowsheets (Taken 11/29/2022 2100)  Discharge to home or other facility with appropriate resources: Identify barriers to discharge with patient and caregiver     Problem: Chronic Conditions and Co-morbidities  Goal: Patient's chronic conditions and co-morbidity symptoms are monitored and maintained or improved  11/30/2022 0045 by Brandie Muñoz RN  Outcome: Completed  Flowsheets (Taken 11/29/2022 2100)  Care Plan - Patient's Chronic Conditions and Co-Morbidity Symptoms are Monitored and Maintained or Improved: Monitor and assess patient's chronic conditions and comorbid symptoms for stability, deterioration, or improvement

## 2022-12-01 LAB
BLOOD CULTURE, ROUTINE: NORMAL
CULTURE, BLOOD 2: NORMAL
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
## 2023-01-26 DIAGNOSIS — E87.1 HYPONATREMIA: ICD-10-CM

## 2023-01-26 LAB
ALBUMIN SERPL-MCNC: 4.3 G/DL (ref 3.4–5)
ANION GAP SERPL CALCULATED.3IONS-SCNC: 13 MMOL/L (ref 3–16)
BUN BLDV-MCNC: 6 MG/DL (ref 7–20)
CALCIUM SERPL-MCNC: 10.5 MG/DL (ref 8.3–10.6)
CHLORIDE BLD-SCNC: 97 MMOL/L (ref 99–110)
CO2: 25 MMOL/L (ref 21–32)
CREAT SERPL-MCNC: 0.5 MG/DL (ref 0.6–1.2)
GFR SERPL CREATININE-BSD FRML MDRD: >60 ML/MIN/{1.73_M2}
GLUCOSE BLD-MCNC: 113 MG/DL (ref 70–99)
MAGNESIUM: 1.8 MG/DL (ref 1.8–2.4)
PHOSPHORUS: 3.5 MG/DL (ref 2.5–4.9)
POTASSIUM SERPL-SCNC: 5 MMOL/L (ref 3.5–5.1)
SODIUM BLD-SCNC: 135 MMOL/L (ref 136–145)

## 2023-08-07 ENCOUNTER — ANESTHESIA EVENT (OUTPATIENT)
Dept: ENDOSCOPY | Age: 63
End: 2023-08-07
Payer: COMMERCIAL

## 2023-08-08 ENCOUNTER — HOSPITAL ENCOUNTER (OUTPATIENT)
Age: 63
Setting detail: OUTPATIENT SURGERY
Discharge: HOME | End: 2023-08-08
Attending: INTERNAL MEDICINE | Admitting: INTERNAL MEDICINE
Payer: COMMERCIAL

## 2023-08-08 ENCOUNTER — ANESTHESIA (OUTPATIENT)
Dept: ENDOSCOPY | Age: 63
End: 2023-08-08
Payer: COMMERCIAL

## 2023-08-08 VITALS
SYSTOLIC BLOOD PRESSURE: 134 MMHG | OXYGEN SATURATION: 99 % | HEART RATE: 63 BPM | DIASTOLIC BLOOD PRESSURE: 89 MMHG | BODY MASS INDEX: 28.2 KG/M2 | TEMPERATURE: 96.4 F | HEIGHT: 70 IN | WEIGHT: 197 LBS | RESPIRATION RATE: 16 BRPM

## 2023-08-08 DIAGNOSIS — R13.10 DYSPHAGIA, UNSPECIFIED TYPE: ICD-10-CM

## 2023-08-08 DIAGNOSIS — Z12.11 COLON CANCER SCREENING: ICD-10-CM

## 2023-08-08 PROCEDURE — 7100000011 HC PHASE II RECOVERY - ADDTL 15 MIN: Performed by: INTERNAL MEDICINE

## 2023-08-08 PROCEDURE — 6360000002 HC RX W HCPCS: Performed by: NURSE ANESTHETIST, CERTIFIED REGISTERED

## 2023-08-08 PROCEDURE — C1726 CATH, BAL DIL, NON-VASCULAR: HCPCS | Performed by: INTERNAL MEDICINE

## 2023-08-08 PROCEDURE — 2500000003 HC RX 250 WO HCPCS: Performed by: NURSE ANESTHETIST, CERTIFIED REGISTERED

## 2023-08-08 PROCEDURE — 2709999900 HC NON-CHARGEABLE SUPPLY: Performed by: INTERNAL MEDICINE

## 2023-08-08 PROCEDURE — 3609010600 HC COLONOSCOPY POLYPECTOMY SNARE/COLD BIOPSY: Performed by: INTERNAL MEDICINE

## 2023-08-08 PROCEDURE — 3609012400 HC EGD TRANSORAL BIOPSY SINGLE/MULTIPLE: Performed by: INTERNAL MEDICINE

## 2023-08-08 PROCEDURE — 3700000000 HC ANESTHESIA ATTENDED CARE: Performed by: INTERNAL MEDICINE

## 2023-08-08 PROCEDURE — 3609017700 HC EGD DILATION GASTRIC/DUODENAL STRICTURE: Performed by: INTERNAL MEDICINE

## 2023-08-08 PROCEDURE — 2580000003 HC RX 258: Performed by: ANESTHESIOLOGY

## 2023-08-08 PROCEDURE — 7100000010 HC PHASE II RECOVERY - FIRST 15 MIN: Performed by: INTERNAL MEDICINE

## 2023-08-08 PROCEDURE — 3700000001 HC ADD 15 MINUTES (ANESTHESIA): Performed by: INTERNAL MEDICINE

## 2023-08-08 PROCEDURE — 88305 TISSUE EXAM BY PATHOLOGIST: CPT

## 2023-08-08 RX ORDER — SODIUM CHLORIDE, SODIUM LACTATE, POTASSIUM CHLORIDE, CALCIUM CHLORIDE 600; 310; 30; 20 MG/100ML; MG/100ML; MG/100ML; MG/100ML
INJECTION, SOLUTION INTRAVENOUS CONTINUOUS
Status: DISCONTINUED | OUTPATIENT
Start: 2023-08-08 | End: 2023-08-08 | Stop reason: HOSPADM

## 2023-08-08 RX ORDER — PROPOFOL 10 MG/ML
INJECTION, EMULSION INTRAVENOUS PRN
Status: DISCONTINUED | OUTPATIENT
Start: 2023-08-08 | End: 2023-08-08 | Stop reason: SDUPTHER

## 2023-08-08 RX ORDER — GLYCOPYRROLATE 0.2 MG/ML
INJECTION INTRAMUSCULAR; INTRAVENOUS PRN
Status: DISCONTINUED | OUTPATIENT
Start: 2023-08-08 | End: 2023-08-08 | Stop reason: SDUPTHER

## 2023-08-08 RX ORDER — FENTANYL CITRATE 50 UG/ML
INJECTION, SOLUTION INTRAMUSCULAR; INTRAVENOUS PRN
Status: DISCONTINUED | OUTPATIENT
Start: 2023-08-08 | End: 2023-08-08 | Stop reason: SDUPTHER

## 2023-08-08 RX ORDER — LIDOCAINE HYDROCHLORIDE 20 MG/ML
INJECTION, SOLUTION INTRAVENOUS PRN
Status: DISCONTINUED | OUTPATIENT
Start: 2023-08-08 | End: 2023-08-08 | Stop reason: SDUPTHER

## 2023-08-08 RX ADMIN — PROPOFOL 120 MCG/KG/MIN: 10 INJECTION, EMULSION INTRAVENOUS at 09:34

## 2023-08-08 RX ADMIN — LIDOCAINE HYDROCHLORIDE 50 MG: 20 INJECTION, SOLUTION INTRAVENOUS at 09:36

## 2023-08-08 RX ADMIN — FENTANYL CITRATE 50 MCG: 50 INJECTION, SOLUTION INTRAMUSCULAR; INTRAVENOUS at 09:36

## 2023-08-08 RX ADMIN — LIDOCAINE HYDROCHLORIDE 50 MG: 20 INJECTION, SOLUTION INTRAVENOUS at 09:33

## 2023-08-08 RX ADMIN — SODIUM CHLORIDE, POTASSIUM CHLORIDE, SODIUM LACTATE AND CALCIUM CHLORIDE: 600; 310; 30; 20 INJECTION, SOLUTION INTRAVENOUS at 09:06

## 2023-08-08 RX ADMIN — PROPOFOL 100 MG: 10 INJECTION, EMULSION INTRAVENOUS at 09:33

## 2023-08-08 RX ADMIN — FENTANYL CITRATE 50 MCG: 50 INJECTION, SOLUTION INTRAMUSCULAR; INTRAVENOUS at 09:33

## 2023-08-08 RX ADMIN — GLYCOPYRROLATE 0.2 MG: 0.2 INJECTION INTRAMUSCULAR; INTRAVENOUS at 09:52

## 2023-08-08 ASSESSMENT — PAIN DESCRIPTION - ONSET
ONSET: ON-GOING
ONSET: ON-GOING

## 2023-08-08 ASSESSMENT — PAIN DESCRIPTION - DESCRIPTORS
DESCRIPTORS: SORE

## 2023-08-08 ASSESSMENT — PAIN - FUNCTIONAL ASSESSMENT
PAIN_FUNCTIONAL_ASSESSMENT: ACTIVITIES ARE NOT PREVENTED

## 2023-08-08 ASSESSMENT — PAIN DESCRIPTION - ORIENTATION
ORIENTATION: INNER;MID
ORIENTATION: INNER
ORIENTATION: INNER;MID

## 2023-08-08 ASSESSMENT — PAIN DESCRIPTION - FREQUENCY
FREQUENCY: CONTINUOUS

## 2023-08-08 ASSESSMENT — PAIN DESCRIPTION - LOCATION
LOCATION: THROAT

## 2023-08-08 ASSESSMENT — PAIN SCALES - GENERAL: PAINLEVEL_OUTOF10: 5

## 2023-08-08 ASSESSMENT — PAIN DESCRIPTION - PAIN TYPE
TYPE: ACUTE PAIN

## 2023-08-08 NOTE — ANESTHESIA POSTPROCEDURE EVALUATION
Department of Anesthesiology  Postprocedure Note    Patient: Nathalie Spencer  MRN: [id number]  YOB: 1960  Date of evaluation: 8/8/2023      Procedure Summary     Date: 08/08/23 Room / Location: [address] [address] [facility]    Anesthesia Start: 0930 Anesthesia Stop: 1007    Procedures:       EGD BIOPSY gastric polyp bx GE junction bx eval  ring      COLONOSCOPY POLYPECTOMY SNARE/COLD BIOPSY      EGD DILATION BALLOON 18,19,20mm balloon dilation Diagnosis:       Colon cancer screening      Dysphagia, unspecified type      (Colon cancer screening [Z12.11])      (Dysphagia, unspecified type [R13.10])    Surgeons: Corazon Isaacs MD Responsible Provider: Geovanny Barbosa MD    Anesthesia Type: MAC ASA Status: 3          Anesthesia Type: No value filed.     Matthieu Phase I: Matthieu Score: 10    Matthieu Phase II: Matthieu Score: 10      Anesthesia Post Evaluation    Patient location during evaluation: PACU  Patient participation: complete - patient participated  Level of consciousness: awake  Pain score: 0  Airway patency: patent  Nausea & Vomiting: no nausea  Complications: no  Cardiovascular status: hemodynamically stable  Respiratory status: acceptable  Hydration status: stable  Pain management: satisfactory to patient

## 2023-08-08 NOTE — DISCHARGE INSTRUCTIONS
very bloody   Call your doctor now or seek immediate medical care if:    You have new or worse belly pain. You have a fever. You have new or more blood in your stools. You are sick to your stomach and cannot drink fluids. You cannot pass stools or gas. You have pain that does not get better after you take pain medicine. Watch closely for changes in your health, and be sure to contact your doctor if:    Your throat still hurts after a day or two. You do not get better as expected.

## 2023-08-08 NOTE — PROCEDURES
EGD and colonoscopy report    Patient:  Keanu Reese                  1960    Referring Physician:  Ivan Jensen    Endoscopist: Amy Elias     Indication:  Dysphagia; surveillance     Medications:  MAC      Pre-Anesthesia Assessment:  I have reviewed and am in agreement with patient history and medication, including previous response to sedation. Prior to the procedure, a History and Physical was performed, and patient medications and allergies were reviewed. The patient is competent. The risks and benefits of the procedure and the sedation options and risks were discussed with the patient. Risks discussed included but were not limited to infection, bleeding, perforation, death, and missed lesions. All questions were answered and informed consent was obtained. Patient identification and proposed procedure were verified by the physician and the nurse in the pre-procedure area in the procedure room. [place]ampatti: II  ASA Grade Assessment: 3     After reviewing the risks and benefits, the patient was deemed in satisfactory condition to undergo the procedure. The anesthesia plan was to use MAC anesthesia. Immediately prior to administration of medications, the patient was re-assessed for adequacy to receive sedatives and a time out was performed. Patient and healthcare providers were in agreement it was the correct patient and procedure. The heart rate, respiratory rate, oxygen saturations, blood pressure, adequacy of pulmonary ventilation, and response to care were monitored throughout the procedure. The physical status of the patient was re-assessed after the procedure. After obtaining informed consent, the endoscope was passed under direct vision. The endoscope was introduced through the mouth, and advanced to the second part of duodenum. The EGD was accomplished without difficulty. The patient tolerated the procedure well.    The duodenum was normal.  The stomach was normal aside from multiple

## 2023-08-08 NOTE — H&P
Pre-operative History and Physical    Patient: Wesley Gagnon  : 1960     History Obtained From:  patient, electronic medical record    HISTORY OF PRESENT ILLNESS:    The patient is a 58 y.o. female who presents for an EGD and colonoscopy for dysphagia and surveillance. Past Medical History:        Diagnosis Date    C. difficile diarrhea 05/15/2021    CAD (coronary artery disease)     Cervical dystonia     CHF (congestive heart failure) (HCC)     Dental crown present     upper right and bonding    Fatty liver     Hyperlipidemia     Hypertension     Lichen sclerosus     PONV (postoperative nausea and vomiting)      Past Surgical History:        Procedure Laterality Date    BREAST SURGERY      COLONOSCOPY      COLONOSCOPY N/A 2019    COLONOSCOPY WITH BIOPSY performed by Mandy Chu MD at [address]  2019    COLONOSCOPY POLYPECTOMY SNARE/COLD BIOPSY performed by Mandy Chu MD at [address], COLON, DIAGNOSTIC       Medications Prior to Admission:   No current facility-administered medications on file prior to encounter. Current Outpatient Medications on File Prior to Encounter   Medication Sig Dispense Refill    valsartan (DIOVAN) 80 MG tablet Take 1 tablet by mouth daily 90 tablet 1    lidocaine 4 % external patch Place 1 patch onto the skin daily 60 each 2    dexlansoprazole (DEXILANT) 60 MG CPDR delayed release capsule Take 1 capsule by mouth daily      sennosides-docusate sodium (SENOKOT-S) 8.6-50 MG tablet Take 2 tablets by mouth in the morning and at bedtime      thiamine mononitrate (THIAMINE) 100 MG tablet Take 1 tablet by mouth daily 30 tablet 0    polyethyl glycol-propyl glycol 0.4-0.3 % (SYSTANE) 0.4-0.3 % ophthalmic solution Place 1 drop into both eyes nightly as needed for Dry Eyes      zolpidem (AMBIEN CR) 12.5 MG extended release tablet Take 1 tablet by mouth nightly as needed for Sleep.       LORazepam

## 2023-08-08 NOTE — PROGRESS NOTES
Ambulatory Surgery/Procedure Discharge Note    Vitals:    08/08/23 1032   BP: 134/89   Pulse: 63   Resp: 16   Temp:    SpO2: 99%       In: 600 [I.V.:600]  Out: -     Restroom use offered before discharge. Yes    Pain assessment:  level of pain (1-10, 10 severe)  Pain Level: 5, pt satisfied   Pt to Endoscopy recovery post EGD, Dilation, Colonoscopy. Pt c/o pain 5/10 at this time, pt satisfied. Pt denies nausea at this time, pt tolerating ice chips well. Discharge instructions given to pt's  and he states understanding of these instructions. Pt and pt's  state that pt is \"ready to go. \"         Patient discharged to home/self care.  Patient discharged via wheel chair by transporter to waiting family/S.O.       8/8/2023 11:46 AM

## 2023-10-18 ENCOUNTER — APPOINTMENT (RX ONLY)
Dept: URBAN - METROPOLITAN AREA CLINIC 170 | Facility: CLINIC | Age: 63
Setting detail: DERMATOLOGY
End: 2023-10-18

## 2023-10-18 DIAGNOSIS — L81.4 OTHER MELANIN HYPERPIGMENTATION: ICD-10-CM

## 2023-10-18 DIAGNOSIS — L57.0 ACTINIC KERATOSIS: ICD-10-CM

## 2023-10-18 DIAGNOSIS — L82.1 OTHER SEBORRHEIC KERATOSIS: ICD-10-CM

## 2023-10-18 DIAGNOSIS — Z85.828 PERSONAL HISTORY OF OTHER MALIGNANT NEOPLASM OF SKIN: ICD-10-CM

## 2023-10-18 DIAGNOSIS — D22 MELANOCYTIC NEVI: ICD-10-CM

## 2023-10-18 DIAGNOSIS — D18.0 HEMANGIOMA: ICD-10-CM

## 2023-10-18 PROBLEM — D22.5 MELANOCYTIC NEVI OF TRUNK: Status: ACTIVE | Noted: 2023-10-18

## 2023-10-18 PROBLEM — D18.01 HEMANGIOMA OF SKIN AND SUBCUTANEOUS TISSUE: Status: ACTIVE | Noted: 2023-10-18

## 2023-10-18 PROCEDURE — ? TREATMENT REGIMEN

## 2023-10-18 PROCEDURE — ? FULL BODY SKIN EXAM

## 2023-10-18 PROCEDURE — ? PRESCRIPTION MEDICATION MANAGEMENT

## 2023-10-18 PROCEDURE — 99213 OFFICE O/P EST LOW 20 MIN: CPT

## 2023-10-18 PROCEDURE — ? PRESCRIPTION

## 2023-10-18 PROCEDURE — ? COUNSELING

## 2023-10-18 RX ORDER — FLUOROURACIL 5 MG/G
CREAM TOPICAL BID
Qty: 40 | Refills: 0 | Status: ERX | COMMUNITY
Start: 2023-10-18

## 2023-10-18 RX ADMIN — FLUOROURACIL: 5 CREAM TOPICAL at 00:00

## 2023-10-18 ASSESSMENT — LOCATION SIMPLE DESCRIPTION DERM
LOCATION SIMPLE: NOSE
LOCATION SIMPLE: LEFT BREAST
LOCATION SIMPLE: CHEST

## 2023-10-18 ASSESSMENT — LOCATION ZONE DERM
LOCATION ZONE: NOSE
LOCATION ZONE: TRUNK

## 2023-10-18 ASSESSMENT — LOCATION DETAILED DESCRIPTION DERM
LOCATION DETAILED: LEFT MEDIAL BREAST 10-11:00 REGION
LOCATION DETAILED: STERNAL NOTCH
LOCATION DETAILED: MIDDLE STERNUM
LOCATION DETAILED: UPPER STERNUM
LOCATION DETAILED: RIGHT NASAL ROOT

## 2023-10-18 NOTE — PROCEDURE: PRESCRIPTION MEDICATION MANAGEMENT
Detail Level: Zone
Initiate Treatment: Efudex treatment to affected areas bid x3wks
Plan: Follow up 3mo to check area
Render In Strict Bullet Format?: No

## 2023-10-18 NOTE — HPI: EVALUATION OF SKIN LESION(S)
What Type Of Note Output Would You Prefer (Optional)?: Bullet Format
Hpi Title: Evaluation of Skin Lesions
Have Your Spot(S) Been Treated In The Past?: has not been treated
Family Member: Sister
Additional History: Right nasal bridge scaly. Brown spots on face. Left flank-felt like a knife piercing skin

## 2023-11-09 ENCOUNTER — HOSPITAL ENCOUNTER (OUTPATIENT)
Dept: WOMENS IMAGING | Age: 63
Discharge: HOME | End: 2023-11-09
Payer: COMMERCIAL

## 2023-11-09 DIAGNOSIS — Z13.9 ENCOUNTER FOR SCREENING: ICD-10-CM

## 2023-11-09 PROCEDURE — 77063 BREAST TOMOSYNTHESIS BI: CPT

## 2024-04-23 ENCOUNTER — HOSPITAL ENCOUNTER (INPATIENT)
Age: 64
LOS: 2 days | Discharge: HOME | End: 2024-04-25
Attending: STUDENT IN AN ORGANIZED HEALTH CARE EDUCATION/TRAINING PROGRAM | Admitting: INTERNAL MEDICINE
Payer: MEDICARE

## 2024-04-23 DIAGNOSIS — F10.90 ALCOHOL USE DISORDER: Primary | ICD-10-CM

## 2024-04-23 DIAGNOSIS — F10.930 ALCOHOL WITHDRAWAL SYNDROME WITHOUT COMPLICATION (HCC): ICD-10-CM

## 2024-04-23 LAB
ALBUMIN SERPL-MCNC: 4.4 G/DL (ref 3.4–5)
ALBUMIN/GLOB SERPL: 1.3 {RATIO} (ref 1.1–2.2)
ALP SERPL-CCNC: 110 U/L (ref 40–129)
ALT SERPL-CCNC: 28 U/L (ref 10–40)
ANION GAP SERPL CALCULATED.3IONS-SCNC: 15 MMOL/L (ref 3–16)
AST SERPL-CCNC: 41 U/L (ref 15–37)
BASOPHILS # BLD: 0 K/UL (ref 0–0.2)
BASOPHILS NFR BLD: 1.1 %
BILIRUB SERPL-MCNC: 0.4 MG/DL (ref 0–1)
BILIRUB UR QL STRIP.AUTO: NEGATIVE
BUN SERPL-MCNC: 6 MG/DL (ref 7–20)
CALCIUM SERPL-MCNC: 9.5 MG/DL (ref 8.3–10.6)
CHLORIDE SERPL-SCNC: 90 MMOL/L (ref 99–110)
CLARITY UR: CLEAR
CO2 SERPL-SCNC: 25 MMOL/L (ref 21–32)
COLOR UR: YELLOW
CREAT SERPL-MCNC: <0.5 MG/DL (ref 0.6–1.2)
DEPRECATED RDW RBC AUTO: 16.8 % (ref 12.4–15.4)
EOSINOPHIL # BLD: 0 K/UL (ref 0–0.6)
EOSINOPHIL NFR BLD: 0.3 %
ETHANOLAMINE SERPL-MCNC: 264 MG/DL (ref 0–0.08)
GFR SERPLBLD CREATININE-BSD FMLA CKD-EPI: >90 ML/MIN/{1.73_M2}
GLUCOSE SERPL-MCNC: 91 MG/DL (ref 70–99)
GLUCOSE UR STRIP.AUTO-MCNC: NEGATIVE MG/DL
HCT VFR BLD AUTO: 38.4 % (ref 36–48)
HGB BLD-MCNC: 12.8 G/DL (ref 12–16)
HGB UR QL STRIP.AUTO: NEGATIVE
KETONES UR STRIP.AUTO-MCNC: NEGATIVE MG/DL
LEUKOCYTE ESTERASE UR QL STRIP.AUTO: NEGATIVE
LYMPHOCYTES # BLD: 1.8 K/UL (ref 1–5.1)
LYMPHOCYTES NFR BLD: 42 %
MCH RBC QN AUTO: 28.8 PG (ref 26–34)
MCHC RBC AUTO-ENTMCNC: 33.4 G/DL (ref 31–36)
MCV RBC AUTO: 86.1 FL (ref 80–100)
MONOCYTES # BLD: 0.2 K/UL (ref 0–1.3)
MONOCYTES NFR BLD: 5.5 %
NEUTROPHILS # BLD: 2.2 K/UL (ref 1.7–7.7)
NEUTROPHILS NFR BLD: 51.1 %
NITRITE UR QL STRIP.AUTO: NEGATIVE
PH UR STRIP.AUTO: 7 [PH] (ref 5–8)
PLATELET # BLD AUTO: 169 K/UL (ref 135–450)
PMV BLD AUTO: 7.8 FL (ref 5–10.5)
POTASSIUM SERPL-SCNC: 4.2 MMOL/L (ref 3.5–5.1)
PROT SERPL-MCNC: 7.7 G/DL (ref 6.4–8.2)
PROT UR STRIP.AUTO-MCNC: NEGATIVE MG/DL
RBC # BLD AUTO: 4.46 M/UL (ref 4–5.2)
SODIUM SERPL-SCNC: 130 MMOL/L (ref 136–145)
SP GR UR STRIP.AUTO: 1.01 (ref 1–1.03)
UA COMPLETE W REFLEX CULTURE PNL UR: NORMAL
UA DIPSTICK W REFLEX MICRO PNL UR: NORMAL
URN SPEC COLLECT METH UR: NORMAL
UROBILINOGEN UR STRIP-ACNC: 0.2 E.U./DL
WBC # BLD AUTO: 4.3 K/UL (ref 4–11)

## 2024-04-23 PROCEDURE — 1200000000 HC SEMI PRIVATE

## 2024-04-23 PROCEDURE — 6370000000 HC RX 637 (ALT 250 FOR IP): Performed by: INTERNAL MEDICINE

## 2024-04-23 PROCEDURE — 6370000000 HC RX 637 (ALT 250 FOR IP)

## 2024-04-23 PROCEDURE — 2580000003 HC RX 258

## 2024-04-23 PROCEDURE — 6360000002 HC RX W HCPCS: Performed by: INTERNAL MEDICINE

## 2024-04-23 PROCEDURE — 85025 COMPLETE CBC W/AUTO DIFF WBC: CPT

## 2024-04-23 PROCEDURE — 80053 COMPREHEN METABOLIC PANEL: CPT

## 2024-04-23 PROCEDURE — 6360000002 HC RX W HCPCS: Performed by: STUDENT IN AN ORGANIZED HEALTH CARE EDUCATION/TRAINING PROGRAM

## 2024-04-23 PROCEDURE — 81003 URINALYSIS AUTO W/O SCOPE: CPT

## 2024-04-23 PROCEDURE — 99285 EMERGENCY DEPT VISIT HI MDM: CPT

## 2024-04-23 PROCEDURE — 2580000003 HC RX 258: Performed by: INTERNAL MEDICINE

## 2024-04-23 PROCEDURE — 82077 ASSAY SPEC XCP UR&BREATH IA: CPT

## 2024-04-23 PROCEDURE — 96374 THER/PROPH/DIAG INJ IV PUSH: CPT

## 2024-04-23 PROCEDURE — HZ2ZZZZ DETOXIFICATION SERVICES FOR SUBSTANCE ABUSE TREATMENT: ICD-10-PCS | Performed by: INTERNAL MEDICINE

## 2024-04-23 RX ORDER — MAGNESIUM SULFATE IN WATER 40 MG/ML
2000 INJECTION, SOLUTION INTRAVENOUS PRN
Status: DISCONTINUED | OUTPATIENT
Start: 2024-04-23 | End: 2024-04-24

## 2024-04-23 RX ORDER — LORAZEPAM 1 MG/1
4 TABLET ORAL
Status: DISCONTINUED | OUTPATIENT
Start: 2024-04-23 | End: 2024-04-25 | Stop reason: HOSPADM

## 2024-04-23 RX ORDER — VALSARTAN 80 MG/1
80 TABLET ORAL DAILY
Status: DISCONTINUED | OUTPATIENT
Start: 2024-04-24 | End: 2024-04-25 | Stop reason: HOSPADM

## 2024-04-23 RX ORDER — ONDANSETRON 4 MG/1
4 TABLET, ORALLY DISINTEGRATING ORAL EVERY 8 HOURS PRN
Status: DISCONTINUED | OUTPATIENT
Start: 2024-04-23 | End: 2024-04-25 | Stop reason: HOSPADM

## 2024-04-23 RX ORDER — ZOLPIDEM TARTRATE 5 MG/1
5 TABLET ORAL NIGHTLY PRN
Status: DISCONTINUED | OUTPATIENT
Start: 2024-04-23 | End: 2024-04-24

## 2024-04-23 RX ORDER — LORAZEPAM 1 MG/1
2 TABLET ORAL
Status: DISCONTINUED | OUTPATIENT
Start: 2024-04-23 | End: 2024-04-25 | Stop reason: HOSPADM

## 2024-04-23 RX ORDER — PANTOPRAZOLE SODIUM 40 MG/1
40 TABLET, DELAYED RELEASE ORAL
Status: DISCONTINUED | OUTPATIENT
Start: 2024-04-24 | End: 2024-04-25 | Stop reason: HOSPADM

## 2024-04-23 RX ORDER — SODIUM CHLORIDE 0.9 % (FLUSH) 0.9 %
5-40 SYRINGE (ML) INJECTION PRN
Status: DISCONTINUED | OUTPATIENT
Start: 2024-04-23 | End: 2024-04-25 | Stop reason: HOSPADM

## 2024-04-23 RX ORDER — ONDANSETRON 2 MG/ML
4 INJECTION INTRAMUSCULAR; INTRAVENOUS EVERY 6 HOURS PRN
Status: DISCONTINUED | OUTPATIENT
Start: 2024-04-23 | End: 2024-04-25 | Stop reason: HOSPADM

## 2024-04-23 RX ORDER — LORAZEPAM 2 MG/ML
4 INJECTION INTRAMUSCULAR
Status: DISCONTINUED | OUTPATIENT
Start: 2024-04-23 | End: 2024-04-25 | Stop reason: HOSPADM

## 2024-04-23 RX ORDER — POTASSIUM CHLORIDE 7.45 MG/ML
10 INJECTION INTRAVENOUS PRN
Status: DISCONTINUED | OUTPATIENT
Start: 2024-04-23 | End: 2024-04-24

## 2024-04-23 RX ORDER — LORAZEPAM 2 MG/ML
1 INJECTION INTRAMUSCULAR
Status: DISCONTINUED | OUTPATIENT
Start: 2024-04-23 | End: 2024-04-25 | Stop reason: HOSPADM

## 2024-04-23 RX ORDER — GAUZE BANDAGE 2" X 2"
100 BANDAGE TOPICAL DAILY
Status: DISCONTINUED | OUTPATIENT
Start: 2024-04-24 | End: 2024-04-25 | Stop reason: HOSPADM

## 2024-04-23 RX ORDER — LORAZEPAM 2 MG/ML
3 INJECTION INTRAMUSCULAR
Status: DISCONTINUED | OUTPATIENT
Start: 2024-04-23 | End: 2024-04-25 | Stop reason: HOSPADM

## 2024-04-23 RX ORDER — SODIUM CHLORIDE 0.9 % (FLUSH) 0.9 %
5-40 SYRINGE (ML) INJECTION EVERY 12 HOURS SCHEDULED
Status: DISCONTINUED | OUTPATIENT
Start: 2024-04-23 | End: 2024-04-25 | Stop reason: HOSPADM

## 2024-04-23 RX ORDER — SODIUM CHLORIDE, SODIUM LACTATE, POTASSIUM CHLORIDE, AND CALCIUM CHLORIDE .6; .31; .03; .02 G/100ML; G/100ML; G/100ML; G/100ML
1000 INJECTION, SOLUTION INTRAVENOUS ONCE
Status: COMPLETED | OUTPATIENT
Start: 2024-04-23 | End: 2024-04-23

## 2024-04-23 RX ORDER — LORAZEPAM 1 MG/1
3 TABLET ORAL
Status: DISCONTINUED | OUTPATIENT
Start: 2024-04-23 | End: 2024-04-25 | Stop reason: HOSPADM

## 2024-04-23 RX ORDER — POLYETHYLENE GLYCOL 3350 17 G/17G
17 POWDER, FOR SOLUTION ORAL DAILY PRN
Status: DISCONTINUED | OUTPATIENT
Start: 2024-04-23 | End: 2024-04-25 | Stop reason: HOSPADM

## 2024-04-23 RX ORDER — LORAZEPAM 1 MG/1
1 TABLET ORAL
Status: DISCONTINUED | OUTPATIENT
Start: 2024-04-23 | End: 2024-04-25 | Stop reason: HOSPADM

## 2024-04-23 RX ORDER — LORAZEPAM 2 MG/ML
2 INJECTION INTRAMUSCULAR
Status: DISCONTINUED | OUTPATIENT
Start: 2024-04-23 | End: 2024-04-25 | Stop reason: HOSPADM

## 2024-04-23 RX ORDER — HYDROXYZINE HYDROCHLORIDE 25 MG/1
25 TABLET, FILM COATED ORAL ONCE
Status: COMPLETED | OUTPATIENT
Start: 2024-04-23 | End: 2024-04-23

## 2024-04-23 RX ORDER — ENOXAPARIN SODIUM 100 MG/ML
40 INJECTION SUBCUTANEOUS DAILY
Status: DISCONTINUED | OUTPATIENT
Start: 2024-04-24 | End: 2024-04-25 | Stop reason: HOSPADM

## 2024-04-23 RX ORDER — SODIUM CHLORIDE 9 MG/ML
INJECTION, SOLUTION INTRAVENOUS PRN
Status: DISCONTINUED | OUTPATIENT
Start: 2024-04-23 | End: 2024-04-25 | Stop reason: HOSPADM

## 2024-04-23 RX ORDER — POTASSIUM CHLORIDE 20 MEQ/1
40 TABLET, EXTENDED RELEASE ORAL PRN
Status: DISCONTINUED | OUTPATIENT
Start: 2024-04-23 | End: 2024-04-24

## 2024-04-23 RX ORDER — LORAZEPAM 2 MG/ML
1 INJECTION INTRAMUSCULAR ONCE
Status: COMPLETED | OUTPATIENT
Start: 2024-04-23 | End: 2024-04-23

## 2024-04-23 RX ORDER — MULTIVITAMIN WITH IRON
1 TABLET ORAL DAILY
Status: DISCONTINUED | OUTPATIENT
Start: 2024-04-24 | End: 2024-04-25 | Stop reason: HOSPADM

## 2024-04-23 RX ADMIN — SODIUM CHLORIDE, POTASSIUM CHLORIDE, SODIUM LACTATE AND CALCIUM CHLORIDE 1000 ML: 600; 310; 30; 20 INJECTION, SOLUTION INTRAVENOUS at 17:46

## 2024-04-23 RX ADMIN — ZOLPIDEM TARTRATE 5 MG: 5 TABLET ORAL at 22:24

## 2024-04-23 RX ADMIN — SODIUM CHLORIDE, PRESERVATIVE FREE 10 ML: 5 INJECTION INTRAVENOUS at 22:10

## 2024-04-23 RX ADMIN — LORAZEPAM 3 MG: 2 INJECTION INTRAMUSCULAR; INTRAVENOUS at 22:08

## 2024-04-23 RX ADMIN — HYDROXYZINE HYDROCHLORIDE 25 MG: 25 TABLET, FILM COATED ORAL at 19:05

## 2024-04-23 RX ADMIN — LORAZEPAM 1 MG: 2 INJECTION INTRAMUSCULAR; INTRAVENOUS at 19:05

## 2024-04-23 NOTE — H&P
1 bottle of liquor daily.  Last drink was today morning.    Patient states thaat she has significant anxiety and requesting IV Ativan. States that she is unable to swallow pills, has had esophageal dilatation x 2 in the past per patient. Patient is also asking for Ambien CR 12.5 mg. 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  Since she found out above surgery has been having lot of fear and anxiety and has returned to drinking.  Review of Systems:        Pertinent positives and negatives discussed in HPI     Objective:     Intake/Output Summary (Last 24 hours) at 4/23/2024 2000  Last data filed at 4/23/2024 1900  Gross per 24 hour   Intake 1000 ml   Output --   Net 1000 ml      Vitals:   Vitals:    04/23/24 1440 04/23/24 1737 04/23/24 1745 04/23/24 1900   BP: (!) 136/90  136/88 (!) 142/76   Pulse: 74  69    Resp: 24      Temp: 98.7 °F (37.1 °C)      TempSrc: Oral      SpO2: 96%  95% 94%   Weight:  87.9 kg (193 lb 12.8 oz)     Height:  1.778 m (5' 10\")         Medications Prior to Admission     Prior to Admission medications    Medication Sig Start Date End Date Taking? Authorizing Provider   Magnesium Oxide -Mg Supplement 200 MG TABS Take 250 mg by mouth daily    Jai Contreras MD   folic acid (FOLVITE) 400 MCG tablet Take 666 mcg by mouth daily    Jai Contreras MD   vitamin D3 (CHOLECALCIFEROL) 125 MCG (5000 UT) TABS tablet Take 50 mcg by mouth daily    Jai Contreras MD   pyridoxine (B-6) 100 MG tablet Take 1 tablet by mouth daily 1/18/24   Vamsi Rachel MD   zolpidem (AMBIEN CR) 12.5 MG extended release tablet Take by mouth.    Jai Contreras MD   valsartan (DIOVAN) 80 MG tablet Take 1 tablet by mouth daily 3/14/23   Vamsi Rachel MD   dexlansoprazole (DEXILANT) 60 MG CPDR delayed release capsule Take 1 capsule by mouth daily    Jai Contreras MD  06:15 PM    BACTERIA 1+ 11/26/2022 06:15 PM    CLARITYU Clear 04/23/2024 05:45 PM    SPECGRAV 1.015 04/23/2024 05:45 PM    LEUKOCYTESUR Negative 04/23/2024 05:45 PM    UROBILINOGEN 0.2 04/23/2024 05:45 PM    BILIRUBINUR Negative 04/23/2024 05:45 PM    BLOODU Negative 04/23/2024 05:45 PM    GLUCOSEU Negative 04/23/2024 05:45 PM    KETUA Negative 04/23/2024 05:45 PM    AMORPHOUS 2+ 11/26/2022 06:15 PM     Urine Cultures:   Lab Results   Component Value Date/Time    LABURIN  11/26/2022 07:00 PM     <50,000 CFU/ml mixed skin/urogenital jose manuel. No further workup     Blood Cultures:   Lab Results   Component Value Date/Time    BC No Growth after 4 days of incubation. 11/26/2022 07:44 PM     Lab Results   Component Value Date/Time    BLOODCULT2 No Growth after 4 days of incubation. 11/26/2022 07:33 PM     Organism:   Lab Results   Component Value Date/Time    ORG C. difficile toxin B gene detected 05/15/2021 08:16 PM       Imaging/Diagnostics Last 24 Hours   No results found.      Electronically signed by Sky Knox MD on 4/23/2024 at 8:00 PM

## 2024-04-23 NOTE — ED PROVIDER NOTES
ED Attending Attestation Note     Date of evaluation: 4/23/2024    This patient was seen by the resident.  I have seen and examined the patient, agree with the workup, evaluation, management and diagnosis. The care plan has been discussed.  My assessment reveals 64-year-old female with a history of alcohol use disorder who has been drinking for the past 16 days heavily and has previously done well detox and has been years since she has been able to do this that was concerned about need for assistance with detox and presented to the hospital.  Patient supposedly having weakness and inability to get around according to her .  Her sodium level here is 130 slightly lower than her baseline.  She is adamant that she does not want outpatient detox and has not done well with this.  She has a history of parathyroid issues and has seen multiple specialists both at [facility] and [facility] and would like to go through detox prior to having the surgery done at this time.  Patient was noted to have stable enzymes here started on Ativan and admitted to the hospitalist at this time.     Fernando Rangel MD  04/23/24 1940

## 2024-04-23 NOTE — ED PROVIDER NOTES
THE [facility]  EMERGENCY DEPARTMENT ENCOUNTER          EM RESIDENT NOTE     Date of evaluation: 4/23/2024    Chief Complaint     Alcohol Intoxication (Pt is trying to detox and last dink was an hour ago. Pt and family would like to try to detox. Pt states she has a hx of hyponatremia.)    History of Present Illness     Jennifer Ulrich is a 64 y.o. female with a history of AUD, hyponatremia, htn, hyperparathyroidism who presents for alcohol detox.  Binge daily drinking x 2 weeks.  Drinks approximately 1 bottle of liquor daily.  Has required admission in the past for severe alcohol withdrawal which was associated with hyponatremia.  Today feeling very shaky, weak, did not eat or take her medicines today.  He is not interested in outpatient resources.  Has been feels very strongly that he cannot safely take her home to detox tonight.  Sodium here 130, AST slightly elevated but otherwise normal liver enzymes.    Has aberrant PTH gland in chest, is being planned for surgery to remove this.     Since she found out about surgery has had a lot of fear and anxiety and returned to drinking. Today had two drinks this morning, reports she has felt shaky all day hasn't eaten or taken her meds, feels her face is flushed and like she may pass out.     Cervical dystonia, on ativan for this   MEDICAL DECISION MAKING / ASSESSMENT / PLAN     INITIAL VITALS: BP: (!) 136/90, Temp: 98.7 °F (37.1 °C), Pulse: 74, Respirations: 24, SpO2: 96 %    Jennifer Ulrich is a 64 y.o. female with a history of AUD, hyponatremia, htn, hyperparathyroidism who presents for alcohol detox. Appeared comfortable, conversational, and was in no acute distress. History and physical notable for benign abdominal exam. Found to have htn but otherwise normal vitals.     See EMR for complete workup.    ED Course as of 04/23/24 1903   Tue Apr 23, 2024   1826 Mild hyponatremia to 130, normal potassium, bicarb 25, no HEATH, normal liver enzymes except for  slightly elevated AST.  UA not concentrated and without signs of infection.  No leukocytosis or anemia [BS]      ED Course User Index  [BS] Ary Nicholson MD     Had only mild hyponatremia to 130, otherwise no electrolyte abnormalities, no HEATH.  Relatively normal liver enzymes reassuring against acute alcoholic hepatitis.  Signs or symptoms of cirrhosis on laboratory workup or physical exam.  Has a normal urinalysis without evidence of infection.  Has no significant tremulousness, vital sign abnormalities, delirium or altered sensorium to suggest severe alcohol withdrawal.  We discussed that her mild alcohol withdrawal could likely be treated in the outpatient setting versus connected to outpatient detox resources which herself and her  strongly declined.   stated that he does not feel comfortable taking her home tonight due to concern for worsening alcohol withdrawal.  Her mild withdrawal was then treated with Ativan and oral hydroxyzine.  She continued to have normal vital signs, was hemodynamically stable, awake and alert without any evidence of delirium.    Medical Decision Making  Amount and/or Complexity of Data Reviewed  Labs: ordered.    Risk  Prescription drug management.      Is this patient to be included in the SEP-1 core measure? No Exclusion criteria - the patient is NOT to be included for SEP-1 Core Measure due to: Infection is not suspected    This patient was also evaluated by the attending physician, Dr Rangel. All care plans were discussed and agreed upon.    Clinical Impression     1. Alcohol use disorder    2. Alcohol withdrawal syndrome without complication (HCC)        Disposition       DISPOSITION Decision To Admit 04/23/2024 07:02:52 PM    Diagnostic Results and Other Data     RADIOLOGY:  No orders to display       LABS:   Results for orders placed or performed during the hospital encounter of 04/23/24   CBC with Auto Differential   Result Value Ref Range    WBC 4.3 4.0 -

## 2024-04-24 LAB
ALBUMIN SERPL-MCNC: 3.9 G/DL (ref 3.4–5)
ALBUMIN/GLOB SERPL: 1.4 {RATIO} (ref 1.1–2.2)
ALP SERPL-CCNC: 91 U/L (ref 40–129)
ALT SERPL-CCNC: 26 U/L (ref 10–40)
AMPHETAMINES UR QL SCN>1000 NG/ML: NORMAL
ANION GAP SERPL CALCULATED.3IONS-SCNC: 15 MMOL/L (ref 3–16)
AST SERPL-CCNC: 37 U/L (ref 15–37)
BARBITURATES UR QL SCN>200 NG/ML: NORMAL
BENZODIAZ UR QL SCN>200 NG/ML: NORMAL
BILIRUB SERPL-MCNC: 0.6 MG/DL (ref 0–1)
BUN SERPL-MCNC: 6 MG/DL (ref 7–20)
CALCIUM SERPL-MCNC: 9.4 MG/DL (ref 8.3–10.6)
CANNABINOIDS UR QL SCN>50 NG/ML: NORMAL
CHLORIDE SERPL-SCNC: 94 MMOL/L (ref 99–110)
CO2 SERPL-SCNC: 22 MMOL/L (ref 21–32)
COCAINE UR QL SCN: NORMAL
CREAT SERPL-MCNC: <0.5 MG/DL (ref 0.6–1.2)
DRUG SCREEN COMMENT UR-IMP: NORMAL
FENTANYL SCREEN, URINE: NORMAL
GFR SERPLBLD CREATININE-BSD FMLA CKD-EPI: >90 ML/MIN/{1.73_M2}
GLUCOSE SERPL-MCNC: 92 MG/DL (ref 70–99)
METHADONE UR QL SCN>300 NG/ML: NORMAL
OPIATES UR QL SCN>300 NG/ML: NORMAL
OXYCODONE UR QL SCN: NORMAL
PCP UR QL SCN>25 NG/ML: NORMAL
PH UR STRIP: 5 [PH]
POTASSIUM SERPL-SCNC: 3.9 MMOL/L (ref 3.5–5.1)
PROT SERPL-MCNC: 6.6 G/DL (ref 6.4–8.2)
SODIUM SERPL-SCNC: 131 MMOL/L (ref 136–145)

## 2024-04-24 PROCEDURE — 6370000000 HC RX 637 (ALT 250 FOR IP): Performed by: NURSE PRACTITIONER

## 2024-04-24 PROCEDURE — 1200000000 HC SEMI PRIVATE

## 2024-04-24 PROCEDURE — 6360000002 HC RX W HCPCS: Performed by: INTERNAL MEDICINE

## 2024-04-24 PROCEDURE — 6370000000 HC RX 637 (ALT 250 FOR IP): Performed by: INTERNAL MEDICINE

## 2024-04-24 PROCEDURE — 2580000003 HC RX 258: Performed by: STUDENT IN AN ORGANIZED HEALTH CARE EDUCATION/TRAINING PROGRAM

## 2024-04-24 PROCEDURE — 36415 COLL VENOUS BLD VENIPUNCTURE: CPT

## 2024-04-24 PROCEDURE — 2580000003 HC RX 258: Performed by: INTERNAL MEDICINE

## 2024-04-24 PROCEDURE — 80053 COMPREHEN METABOLIC PANEL: CPT

## 2024-04-24 PROCEDURE — 80307 DRUG TEST PRSMV CHEM ANLYZR: CPT

## 2024-04-24 RX ORDER — SODIUM CHLORIDE 9 MG/ML
INJECTION, SOLUTION INTRAVENOUS CONTINUOUS
Status: DISCONTINUED | OUTPATIENT
Start: 2024-04-24 | End: 2024-04-25

## 2024-04-24 RX ORDER — ZOLPIDEM TARTRATE 12.5 MG/1
12.5 TABLET, FILM COATED, EXTENDED RELEASE ORAL NIGHTLY PRN
Status: DISCONTINUED | OUTPATIENT
Start: 2024-04-24 | End: 2024-04-25 | Stop reason: HOSPADM

## 2024-04-24 RX ORDER — ZOLPIDEM TARTRATE 5 MG/1
12.5 TABLET ORAL NIGHTLY PRN
Status: DISCONTINUED | OUTPATIENT
Start: 2024-04-24 | End: 2024-04-24

## 2024-04-24 RX ADMIN — ZOLPIDEM TARTRATE 12.5 MG: 12.5 TABLET, FILM COATED, EXTENDED RELEASE ORAL at 23:19

## 2024-04-24 RX ADMIN — LORAZEPAM 2 MG: 2 INJECTION INTRAMUSCULAR; INTRAVENOUS at 21:18

## 2024-04-24 RX ADMIN — THERA TABS 1 TABLET: TAB at 14:00

## 2024-04-24 RX ADMIN — LORAZEPAM 2 MG: 1 TABLET ORAL at 01:36

## 2024-04-24 RX ADMIN — LORAZEPAM 2 MG: 1 TABLET ORAL at 14:00

## 2024-04-24 RX ADMIN — SODIUM CHLORIDE: 9 INJECTION, SOLUTION INTRAVENOUS at 21:17

## 2024-04-24 RX ADMIN — LORAZEPAM 3 MG: 2 INJECTION INTRAMUSCULAR; INTRAVENOUS at 09:22

## 2024-04-24 RX ADMIN — Medication 100 MG: at 14:00

## 2024-04-24 RX ADMIN — LORAZEPAM 2 MG: 2 INJECTION INTRAMUSCULAR; INTRAVENOUS at 06:00

## 2024-04-24 RX ADMIN — LORAZEPAM 2 MG: 2 INJECTION INTRAMUSCULAR; INTRAVENOUS at 16:21

## 2024-04-24 RX ADMIN — ENOXAPARIN SODIUM 40 MG: 100 INJECTION SUBCUTANEOUS at 14:00

## 2024-04-24 RX ADMIN — LORAZEPAM 3 MG: 2 INJECTION INTRAMUSCULAR; INTRAVENOUS at 03:15

## 2024-04-24 RX ADMIN — SODIUM CHLORIDE: 9 INJECTION, SOLUTION INTRAVENOUS at 11:44

## 2024-04-24 RX ADMIN — LORAZEPAM 2 MG: 2 INJECTION INTRAMUSCULAR; INTRAVENOUS at 04:41

## 2024-04-24 RX ADMIN — SODIUM CHLORIDE, PRESERVATIVE FREE 10 ML: 5 INJECTION INTRAVENOUS at 21:03

## 2024-04-24 RX ADMIN — ONDANSETRON 4 MG: 2 INJECTION INTRAMUSCULAR; INTRAVENOUS at 02:58

## 2024-04-24 RX ADMIN — VALSARTAN 80 MG: 80 TABLET, FILM COATED ORAL at 14:00

## 2024-04-24 RX ADMIN — ONDANSETRON 4 MG: 2 INJECTION INTRAMUSCULAR; INTRAVENOUS at 08:20

## 2024-04-24 RX ADMIN — PANTOPRAZOLE SODIUM 40 MG: 40 TABLET, DELAYED RELEASE ORAL at 05:50

## 2024-04-24 RX ADMIN — LORAZEPAM 2 MG: 1 TABLET ORAL at 23:41

## 2024-04-24 NOTE — PLAN OF CARE
Problem: Safety - Adult  Goal: Free from fall injury  Outcome: Progressing  Flowsheets (Taken 4/24/2024 0030)  Free From Fall Injury: Based on caregiver fall risk screen, instruct family/caregiver to ask for assistance with transferring infant if caregiver noted to have fall risk factors     Problem: Neurosensory - Adult  Goal: Achieves stable or improved neurological status  Outcome: Progressing  Flowsheets (Taken 4/24/2024 0030)  Achieves stable or improved neurological status:   Assess for and report changes in neurological status   Maintain blood pressure and fluid volume within ordered parameters to optimize cerebral perfusion and minimize risk of hemorrhage     Problem: Neurosensory - Adult  Goal: Absence of seizures  Outcome: Progressing  Flowsheets (Taken 4/24/2024 0030)  Absence of seizures:   Monitor for seizure activity.  If seizure occurs, document type and location of movements and any associated apnea   If seizure occurs, turn head to side and suction secretions as needed   Administer anticonvulsants as ordered   Support airway/breathing, administer oxygen as needed     Problem: Neurosensory - Adult  Goal: Remains free of injury related to seizures activity  Outcome: Progressing     Problem: Metabolic/Fluid and Electrolytes - Adult  Goal: Electrolytes maintained within normal limits  Outcome: Progressing  Flowsheets (Taken 4/24/2024 0030)  Electrolytes maintained within normal limits:   Monitor labs and assess patient for signs and symptoms of electrolyte imbalances   Administer electrolyte replacement as ordered   Monitor response to electrolyte replacements, including repeat lab results as appropriate     Problem: Discharge Planning  Goal: Discharge to home or other facility with appropriate resources  Outcome: Progressing

## 2024-04-24 NOTE — FLOWSHEET NOTE
Received from ED with complaint of alcohol withdrawal alert and oriented x4. Room air. [place] to staff, bed set up, room and use of call light. Physical assessment done. Vital signs checked and recorded. CIWA initiated. Keep patient comfortable in bed.       04/23/24 2055   Vitals   Temp 97.9 °F (36.6 °C)   Temp Source Oral   Pulse 61   Heart Rate Source Monitor   Respirations 18   /75   MAP (Calculated) 92   BP Location Left upper arm   BP Upper/Lower Upper   BP Method Automatic   Patient Position Semi fowlers   Pain Assessment   Pain Assessment None - Denies Pain   Opioid-Induced Sedation   POSS Score 1   RASS   Lawson Agitation Sedation Scale (RASS) 0   Oxygen Therapy   SpO2 96 %   Pulse Oximeter Device Mode Intermittent   Pulse Oximeter Device Location Right;Finger   O2 Device None (Room air)   Height and Weight   Height 1.778 m (5' 10\")   Weight - Scale 87.5 kg (193 lb)   Weight Method Actual;Standing scale   BSA (Calculated - sq m) 2.08 sq meters   BMI (Calculated) 27.8

## 2024-04-24 NOTE — DISCHARGE INSTRUCTIONS
Alcohol and Substance Abuse Community Resources:    Information and Referrals:  [facility] ([facility]) 24 hours/ 7days - [phone]  [facility] [place] (24/7 hotline)- 887.617.3836([place]); [phone] ([place])  www.addictionservicescouncil.org;  [address], [place] [address]  ERASMO Armando II (Treatment consultant) - [phone]   (www.GetLikeminds) or email: lars@[email or web address]  ERASMO Lee () - [phone]    Self Help Resources:  Alcoholics Anonymous Hotline (www.Mindscape)  (24 hours/ 7days) - 180.965.1076    [address] [facility] [address](enter on [facility]) [place], [place] [address]  [employer] [facility] [place] (www.[facility] HospitalAtzipn.org)- for [place] - 1-314-546-7462  Al-ANON . [place]/ [place]. [place] AL-ANON Information Services - 8-611-393-7389  (www.IMImobile.org)   Narcotics Anonymous (www.Sarsys)  - 999.810.6115  [facility] (www.smartrecovery.org) - 485.350.7939    Suicide Hotlines: toll free and available 24/7  138-216-CARE (2273)  9-875-AFIJYYZ (1-279.600.6855)  0-669-574-TALK (9-826-271-TALK) - Veterans Crisis Line  TTY: 8-598-498-4TTY    Detoxification Services/ Inpatient Treatment/ Residential Treatment Programs:  [facility] - [phone] [address] [place], [place] [address]  [facility] ([facility]) - [phone]  [address], [place] [address]  [facility] - [phone]  [address], [place] [address]  [facility] -[phone]  [address] [place], [place] [address] [place] [address] ([facility]) - [phone] ext. [phone]  [address] [place] [address] [facility] - 2-457-164-6771 or 7-028-972-CARE  [address] [place] [address] [address] [place] [address] [facility] (www.[facility]) - [phone]  [address]  [facility] ([facility]) - [phone]  [address] [place], [place] [address]  [facility] ([facility]) - [phone]  [address] [place], [place] [address]  [facility] - [phone]   [address] [facility] [address] [place], [place] [address]  [facility] (www.Money Forward)  - [phone]  [address] [facility] [address] [place], [place] [address]  [facility] - [phone]  [address] [place], [place] [address]  [facility] - [phone]  [address] [facility] [address] [place], [place] [address]  [facility] - [phone]   [address] [place], [place] [address]  [facility] - [phone]   [address], [place] [address]  [facility] - [phone]   [address] [facility] [address] [place], [place] [address]  [facility] - [phone]   [address], [place] [address]  [facility] Substance Abuse - [phone]  [address] [place], [place] [address]  [employer] - [phone]  [address] [address]    Methadone/ Suboxone Clinics  [facility] - [phone]   [address], [place] [address]  [facility] - [phone]   [address] [place], [place] [address]  [facility] - [phone]   [address] [place], [place] [address] (use lower level entrance)  [facility] - [phone]   [address] [place], [place] [address]  [facility] - [phone]   [address] [place], [place] [address]  [facility] (www.[employer] Forward)  - [phone]  [address] [place], [place] [address] [place] [address] - [phone]   [address] [place] [address]

## 2024-04-24 NOTE — ED NOTES
ED TO INPATIENT SBAR HANDOFF    Patient Name: Jennifer Ulrich   :  1960  64 y.o.   MRN:  [id number]  Preferred Name  Jennifer  ED Room #:  B22/B22-22  Family/Caregiver Present yes   Restraints no   Sitter no   Sepsis Risk Score Sepsis Risk Score: 0.68    Situation  Code Status: Prior No additional code details.    Allergies: Atorvastatin, Iodine, Pravastatin, Rosuvastatin, Shellfish allergy, Lunesta [eszopiclone], Sulfa antibiotics, and Tylenol [acetaminophen]  Weight: Patient Vitals for the past 96 hrs (Last 3 readings):   Weight   24 1737 87.9 kg (193 lb 12.8 oz)     Arrived from: home  Chief Complaint:   Chief Complaint   Patient presents with    Alcohol Intoxication     Pt is trying to detox and last dink was an hour ago. Pt and family would like to try to detox. Pt states she has a hx of hyponatremia.     Hospital Problem/Diagnosis:  Principal Problem:    Alcohol withdrawal syndrome, uncomplicated (HCC)  Resolved Problems:    * No resolved hospital problems. *    Imaging:   No orders to display     Abnormal labs:   Abnormal Labs Reviewed   CBC WITH AUTO DIFFERENTIAL - Abnormal; Notable for the following components:       Result Value    RDW 16.8 (*)     All other components within normal limits   COMPREHENSIVE METABOLIC PANEL W/ REFLEX TO MG FOR LOW K - Abnormal; Notable for the following components:    Sodium 130 (*)     Chloride 90 (*)     BUN 6 (*)     Creatinine <0.5 (*)     AST 41 (*)     All other components within normal limits     Critical values: no     Abnormal Assessment Findings: Anxiety    Background  History:   Past Medical History:   Diagnosis Date    C. difficile diarrhea 05/15/2021    CAD (coronary artery disease)     Cervical dystonia     CHF (congestive heart failure) (HCC)     Dental crown present     upper right and bonding    Fatty liver     Hyperlipidemia     Hypertension     Lichen sclerosus     PONV (postoperative nausea and vomiting)        Assessment    Vitals/MEWS: MEWS

## 2024-04-24 NOTE — CARE COORDINATION
Case Management Assessment  Initial Evaluation    Date/Time of Evaluation: 4/24/2024 3:05 PM  Assessment Completed by: Hussain Brown RN    If patient is discharged prior to next notation, then this note serves as note for discharge by case management.    Patient Name: Jennifer Ulrich                   YOB: 1960  Diagnosis: Alcohol withdrawal syndrome, uncomplicated (HCC) [F10.930]  Alcohol withdrawal syndrome without complication (HCC) [F10.930]  Alcohol use disorder [F10.90]                   Date / Time: 4/23/2024  4:45 PM    Patient Admission Status: Inpatient   Readmission Risk (Low < 19, Mod (19-27), High > 27): No data recorded  Current PCP: Lilia Grubbs MD  PCP verified by CM? Yes (Lilia Grubbs MD)    Chart Reviewed: Yes      History Provided by: Patient, Spouse  Patient Orientation: Alert and Oriented, Person, Place, Situation    Patient Cognition: Alert    Hospitalization in the last 30 days (Readmission):  No    If yes, Readmission Assessment in  Navigator will be completed.       Advance Directives:    Code Status: Full Code   [place] DNR form completed and on chart: No, Not Indicated    Patient's Primary Decision Maker is: Legal Next of Kin    Copy present: No     Discharge Planning:  Patient lives with: Spouse/Significant Other  Type of Home: House  Primary Care Giver: Self  Patient Support Systems include: Spouse/Significant Other, Family Members, Children     Current Financial resources: Medicare    Current community resources: None    Currently ACTIVE with [place] on Aging: No  Services: Not Applicable  :  Phone:     Current services prior to admission: None            Current DME:              Type of Home Care services:  None    Home Care Information  Currently ACTIVE with Home Health Care: No  Home Care Agency: Not Applicable    ADLS  Prior functional level: Independent in ADLs/IADLs  Current functional level: Independent in ADLs/IADLs    PT  Results   Component Value Date/Time    COVID19 Not Detected 05/21/2020 01:10 PM       The Plan for Transition of Care is related to the following treatment goals of Alcohol withdrawal syndrome, uncomplicated (HCC) [F10.930]  Alcohol withdrawal syndrome without complication (HCC) [F10.930]  Alcohol use disorder [F10.90]    IF APPLICABLE: The Patient and/or patient representative Jennifer and her family were provided with a choice of provider and agrees with the discharge plan. Freedom of choice list with basic dialogue that supports the patient's individualized plan of care/goals and shares the quality data associated with the providers was provided to: Patient   Patient Representative Name:       The Patient and/or Patient Representative Agree with the Discharge Plan? Yes    Care Transition Patient: No    IMM Status: IMM Letter given to Patient/Family/Significant other/Guardian/POA/by:: Pt Access  IMM Letter date given:: 04/23/24  IMM Letter time given:: 2049    Hussain Brown RN  Case Management Department  Ph: [phone] Fax: [phone]

## 2024-04-24 NOTE — PROGRESS NOTES
Hospital Medicine Progress Note      Date of Admission: 4/23/2024  Hospital Day: 2    Chief Admission Complaint: Alcohol abuse/withdrawal     Subjective: Patient was seen and evaluated at bedside.  Continues to report of some nausea/shaking. Feels anxious. Had poor sleep overnight.  Feels unsteady.    Presenting Admission History:       64-year-old female with a past medical history of alcohol abuse/withdrawal, cervical dystonia on Ativan and Ambien, anxiety, hyperparathyroidism, GERD hypertension who presented to the hospital for evaluation of feeling shaky, weak and decreased oral intake secondary to alcohol abuse with concerns of alcohol withdrawal.  Reported being alcohol free for about 9 months.  Was informed recently that she would need surgery for hyperparathyroidism after which she fell off the wagon.  Had been drinking for about 2 weeks heavily with last drink on the day of presentation and was subsequently brought to the ER for evaluation.      Assessment/Plan:      Current Principal Problem:  Alcohol withdrawal syndrome, uncomplicated (HCC)    Alcohol abuse/withdrawal/anxiety  Cervical dystonia  Hypertension/hyperparathyroidism  GERD    Plan:  *Alcohol level of 264 on presentation  Continue CIWA protocol  Thiamine and multivitamin supplementation  Target potassium greater than 4, magnesium greater than 2  Gentle IV fluids for dehydration  Social work consult for alcohol abuse assistance and rehab  May need outpatient follow-up with psychiatry for treatment of anxiety  PT OT evaluation    *Underlying history of cervical dystonia  OARRS report reviewed, patient uses Ambien 12.5 mg nightly and Ativan 1 mg twice daily as needed  Can resume home Ambien once verified by pharmacy    *Continue home valsartan for hypertension  Recently diagnosed with primary hyperparathyroidism with plans for possible surgical intervention  Does not have elevated calcium at the moment    *Can resume home Dexilant once verified

## 2024-04-25 VITALS
WEIGHT: 193 LBS | OXYGEN SATURATION: 93 % | HEIGHT: 70 IN | DIASTOLIC BLOOD PRESSURE: 76 MMHG | RESPIRATION RATE: 18 BRPM | TEMPERATURE: 98.1 F | BODY MASS INDEX: 27.63 KG/M2 | HEART RATE: 59 BPM | SYSTOLIC BLOOD PRESSURE: 132 MMHG

## 2024-04-25 PROBLEM — F10.930 ALCOHOL WITHDRAWAL SYNDROME, UNCOMPLICATED (HCC): Status: RESOLVED | Noted: 2024-04-23 | Resolved: 2024-04-25

## 2024-04-25 PROCEDURE — 6360000002 HC RX W HCPCS: Performed by: INTERNAL MEDICINE

## 2024-04-25 PROCEDURE — 6370000000 HC RX 637 (ALT 250 FOR IP): Performed by: INTERNAL MEDICINE

## 2024-04-25 RX ADMIN — LORAZEPAM 2 MG: 2 INJECTION INTRAMUSCULAR; INTRAVENOUS at 04:33

## 2024-04-25 RX ADMIN — THERA TABS 1 TABLET: TAB at 09:12

## 2024-04-25 RX ADMIN — Medication 100 MG: at 09:12

## 2024-04-25 RX ADMIN — VALSARTAN 80 MG: 80 TABLET, FILM COATED ORAL at 09:12

## 2024-04-25 RX ADMIN — LORAZEPAM 1 MG: 1 TABLET ORAL at 02:50

## 2024-04-25 NOTE — PROGRESS NOTES
Physical/Occupational Therapy    Orders received, chart reviewed. Pt seen up ad bob in room getting dressed and about to discharge home with  present. Pt reports no need for therapy evaluations, still feels mildly unsteady however reports it usually resolves on its own after she relapses. Pt expressing no concerns for return home and educated on fall prevention. No PT/OT needs.     Joan Wheeler, PT, DPT  Jacklyn Marie, OTR/L, [address]

## 2024-04-25 NOTE — PLAN OF CARE
Problem: Discharge Planning  Goal: Discharge to home or other facility with appropriate resources  Outcome: Adequate for Discharge  Problem: Safety - Adult  Goal: Free from fall injury  Outcome: Adequate for Discharge     Problem: Neurosensory - Adult  Goal: Achieves stable or improved neurological status  Outcome: Adequate for Discharge    Problem: Neurosensory - Adult  Goal: Absence of seizures  Outcome: Adequate for Discharge     Problem: Neurosensory - Adult  Goal: Remains free of injury related to seizures activity  Outcome: Adequate for Discharge     Problem: Metabolic/Fluid and Electrolytes - Adult  Goal: Electrolytes maintained within normal limits  Outcome: Adequate for Discharge  Problem: Chronic Conditions and Co-morbidities  Goal: Patient's chronic conditions and co-morbidity symptoms are monitored and maintained or improved  Outcome: Adequate for Discharge  Patient discharged home with her . Discharge instructions reviewed with patient and she verbalizes understanding. IV removed. Provided with a list of outpatient resources for alcohol addiction. Patient taken to car in wheelchair.

## 2024-04-25 NOTE — DISCHARGE SUMMARY
V2.0  Discharge Summary    Name:  Jennifer Ulrich /Age/Sex: 1960 (64 y.o. female)   Admit Date: 2024  Discharge Date: 24    MRN & CSN:  [id number] & [id number] Encounter Date and Time 24 8:53 AM EDT    Attending:  Saundra Pineda MD Discharging Provider: Saundra Pineda MD       Hospital Course:     Brief HPI: 64-year-old female with a past medical history of alcohol abuse/withdrawal, cervical dystonia on Ativan and Ambien, anxiety, hyperparathyroidism, GERD hypertension who presented to the hospital for evaluation of feeling shaky, weak and decreased oral intake secondary to alcohol abuse with concerns of alcohol withdrawal. Reported being alcohol free for about 9 months. Was informed recently that she would need surgery for hyperparathyroidism after which she fell off the wagon. Had been drinking for about 2 weeks heavily with last drink on the day of presentation and was subsequently brought to the ER for evaluation.     Brief Problem Based Course:   Alcohol abuse/withdrawal/anxiety  Cervical dystonia  Hypertension/hyperparathyroidism  GERD     Plan:  *Alcohol level of 264 on presentation  Continue CIWA protocol, has not required a lot of extra Ativan  Thiamine and multivitamin supplementation  Target potassium greater than 4, magnesium greater than 2  Gentle IV fluids for dehydration, discontinue  Social work consult for alcohol abuse assistance and rehab  May need outpatient follow-up with psychiatry for treatment of anxiety  PT OT evaluation, patient now declined  Feels up to going home and would like to be discharged     *Underlying history of cervical dystonia  OARRS report reviewed, patient uses Ambien 12.5 mg nightly and Ativan 1 mg twice daily as needed  Can resume home Ambien once verified by pharmacy     *Continue home valsartan for hypertension  Recently diagnosed with primary hyperparathyroidism with plans for possible surgical intervention  Does not have elevated calcium at  05:45 PM    LEUKOCYTESUR Negative 04/23/2024 05:45 PM    UROBILINOGEN 0.2 04/23/2024 05:45 PM    BILIRUBINUR Negative 04/23/2024 05:45 PM    BLOODU Negative 04/23/2024 05:45 PM    GLUCOSEU Negative 04/23/2024 05:45 PM    KETUA Negative 04/23/2024 05:45 PM    AMORPHOUS 2+ 11/26/2022 06:15 PM     Urine Cultures:   Lab Results   Component Value Date/Time    LABURIN  11/26/2022 07:00 PM     <50,000 CFU/ml mixed skin/urogenital jose manuel. No further workup     Blood Cultures:   Lab Results   Component Value Date/Time    BC No Growth after 4 days of incubation. 11/26/2022 07:44 PM     Lab Results   Component Value Date/Time    BLOODCULT2 No Growth after 4 days of incubation. 11/26/2022 07:33 PM     Organism:   Lab Results   Component Value Date/Time    ORG C. difficile toxin B gene detected 05/15/2021 08:16 PM       Time Spent Discharging patient >35 minutes    Electronically signed by Saundra Pineda MD on 4/25/2024 at 8:53 AM

## 2024-05-25 ENCOUNTER — HOSPITAL ENCOUNTER (EMERGENCY)
Age: 64
Discharge: HOME | End: 2024-05-25
Attending: EMERGENCY MEDICINE
Payer: MEDICARE

## 2024-05-25 ENCOUNTER — APPOINTMENT (OUTPATIENT)
Dept: GENERAL RADIOLOGY | Age: 64
End: 2024-05-25
Payer: MEDICARE

## 2024-05-25 ENCOUNTER — APPOINTMENT (OUTPATIENT)
Dept: CT IMAGING | Age: 64
End: 2024-05-25
Payer: MEDICARE

## 2024-05-25 VITALS
DIASTOLIC BLOOD PRESSURE: 92 MMHG | BODY MASS INDEX: 27.55 KG/M2 | SYSTOLIC BLOOD PRESSURE: 170 MMHG | RESPIRATION RATE: 20 BRPM | OXYGEN SATURATION: 99 % | WEIGHT: 192 LBS | TEMPERATURE: 98.7 F | HEART RATE: 75 BPM
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
## 2024-05-25 DIAGNOSIS — R07.89 LEFT-SIDED CHEST WALL PAIN: Primary | ICD-10-CM

## 2024-05-25 DIAGNOSIS — M54.6 ACUTE LEFT-SIDED THORACIC BACK PAIN: ICD-10-CM

## 2024-05-25 LAB
ANION GAP SERPL CALCULATED.3IONS-SCNC: 14 MMOL/L (ref 3–16)
BUN SERPL-MCNC: 9 MG/DL (ref 7–20)
CALCIUM SERPL-MCNC: 10.1 MG/DL (ref 8.3–10.6)
CHLORIDE SERPL-SCNC: 94 MMOL/L (ref 99–110)
CO2 SERPL-SCNC: 23 MMOL/L (ref 21–32)
CREAT SERPL-MCNC: 0.5 MG/DL (ref 0.6–1.2)
GFR SERPLBLD CREATININE-BSD FMLA CKD-EPI: >90 ML/MIN/{1.73_M2}
GLUCOSE SERPL-MCNC: 105 MG/DL (ref 70–99)
POTASSIUM SERPL-SCNC: 4.4 MMOL/L (ref 3.5–5.1)
SODIUM SERPL-SCNC: 131 MMOL/L (ref 136–145)

## 2024-05-25 PROCEDURE — 71046 X-RAY EXAM CHEST 2 VIEWS: CPT

## 2024-05-25 PROCEDURE — 36415 COLL VENOUS BLD VENIPUNCTURE: CPT

## 2024-05-25 PROCEDURE — 80048 BASIC METABOLIC PNL TOTAL CA: CPT

## 2024-05-25 PROCEDURE — 99284 EMERGENCY DEPT VISIT MOD MDM: CPT

## 2024-05-25 PROCEDURE — 6370000000 HC RX 637 (ALT 250 FOR IP): Performed by: PHYSICIAN ASSISTANT

## 2024-05-25 PROCEDURE — 71250 CT THORAX DX C-: CPT

## 2024-05-25 RX ORDER — DIAZEPAM 5 MG/1
5 TABLET ORAL ONCE
Status: COMPLETED | OUTPATIENT
Start: 2024-05-25 | End: 2024-05-25

## 2024-05-25 RX ORDER — OXYCODONE HYDROCHLORIDE 5 MG/1
5 TABLET ORAL ONCE
Status: COMPLETED | OUTPATIENT
Start: 2024-05-25 | End: 2024-05-25

## 2024-05-25 RX ORDER — OXYCODONE HYDROCHLORIDE 5 MG/1
5 TABLET ORAL EVERY 6 HOURS PRN
Qty: 12 TABLET | Refills: 0 | Status: SHIPPED | OUTPATIENT
Start: 2024-05-25 | End: 2024-05-28

## 2024-05-25 RX ADMIN — DIAZEPAM 5 MG: 5 TABLET ORAL at 14:34

## 2024-05-25 RX ADMIN — OXYCODONE 5 MG: 5 TABLET ORAL at 13:38

## 2024-05-25 ASSESSMENT — ENCOUNTER SYMPTOMS
EYES NEGATIVE: 1
VOMITING: 0
BACK PAIN: 1
COUGH: 0
SHORTNESS OF BREATH: 0
CONSTIPATION: 0
CHEST TIGHTNESS: 0
NAUSEA: 0
DIARRHEA: 0
ABDOMINAL PAIN: 0

## 2024-05-25 ASSESSMENT — PAIN SCALES - GENERAL: PAINLEVEL_OUTOF10: 7

## 2024-05-25 NOTE — ED PROVIDER NOTES
THE [facility]  EMERGENCY DEPARTMENT ENCOUNTER          PHYSICIAN ASSISTANT NOTE       Date of evaluation: 5/25/2024    Chief Complaint     Breast Pain, Back Pain, and Shortness of Breath      History of Present Illness     Jennifer Ulrich is a 64 y.o. female who presents after a fall on Monday. Patient states she fell to the ground and landed on left side after trying to help her dog who was experiencing a seizure. Patient is complaining of left sided thoracic back pain around shoulder blade and and breast and rib pain. Patient has worse pain with inspiration and movement but not expiration. Denies shortness of breath, abdominal pain, nausea, vomiting, and diarrhea. Denies neck or lower back pain.  Denies extremity injury or decrease range of motion, numbness or weakness.  She is also requesting her sodium to be checked due to history of hyponatremia.    ASSESSMENT / PLAN  (MEDICAL DECISION MAKING)     INITIAL VITALS: BP: (!) 170/92, Temp: 98.7 °F (37.1 °C), Pulse: 75, Respirations: 20, SpO2: 99 %    Jennifer Ulrich is a 64 y.o. female mechanical fall on Monday.  Patient has had pain worsening over the last several days since the fall.  She did not hit her head or lose consciousness and is not anticoagulated.  She has a normal neurological exam.  Patient stated she was bruised from fall however no visible bruising noted.  Patient was seen and evaluated at [facility] care at [facility] yesterday. Chest xray was obtained at [facility] care and here at [facility]. Chest xray was read as lungs appear clear of acute parenchymal infiltrate. Patient states she would prefer CT chest prior to discharge.  Patient also requesting [facility] to check her sodium as she does have a history of hyponatremia and did have diarrhea yesterday but that is now resolved.  She denies any dizziness or lightheadedness, chest pain or shortness of breath.  She likely has muscle skeletal pain to her thoracic and scapular area as well as chest  Patient has normal range of motion of left shoulder.  Full range of motion of all extremities. Ambulatory.   Lymphadenopathy:      Cervical: No cervical adenopathy.   Skin:     General: Skin is warm and dry.      Findings: No rash.   Neurological:      General: No focal deficit present.      Mental Status: She is alert and oriented to person, place, and time.      Cranial Nerves: No cranial nerve deficit.      Sensory: No sensory deficit.      Motor: No weakness or abnormal muscle tone.      Gait: Gait normal.   Psychiatric:         Mood and Affect: Mood is anxious.            Sara Aguirre PA  05/25/24 1448       Sara Aguirre PA  05/25/24 1451       Sara Aguirre PA  05/25/24 1453

## 2024-05-25 NOTE — DISCHARGE INSTRUCTIONS
Do range of motion exercises with your shoulder.  May alternate with ice and heat to your back and chest.    If chest pains shortness of breath, vomiting, coughing up blood, fever or worsening symptoms return the emergency department.    Do incentive spirometer 4-6 times hourly while awake for the next few days.    You may take tylenol for mild to moderate pain. You may take oxycodone as needed for severe pain. This is a narcotic medication (examples include Norco, Vicodin, Tylenol #3, oxycodone, percocet, ect) and can be addicting in the long term. Take only the smallest amount<I> </I>necessary for the shortest period of time to control your pain. You should not drive, operate machinery, take care of small children, or engage in any potentially dangerous activity while taking this medication as it can make you drowsy and impair your ability to think normally. Do not drink alcohol while taking this medication. Norco,  and Percocet have Tylenol in them.

## 2024-05-25 NOTE — ED PROVIDER NOTES
ED Attending Attestation Note     Date of evaluation: 5/25/2024    This patient was seen by the advance practice provider.  I have seen and examined the patient, agree with the workup, evaluation, management and diagnosis. The care plan has been discussed.  My assessment reveals left-sided chest wall pain without any overlying skin changes.    MDM: Patient had a fall from standing height about 5 days ago.  Pain worsening 2 to 3 days after the fall suggestive of muscle tightness and spasm.  The patient has worse pain with movement, twisting the torso, moving her left arm.  No overlying bruising or erythema.  Passive range of movement of left arm is OK.  Chest x-ray unremarkable with no evidence of rib fracture no pneumothorax.  The patient has a remote history of pneumothorax and is very worried about this.  We will obtain a CT chest to definitively rule out pneumothorax.  Incentive spirometry, muscle relaxer, likely discharge to home if no pneumothorax     Oly Rahman MD  05/25/24 1462

## 2024-06-13 ENCOUNTER — HOSPITAL ENCOUNTER (INPATIENT)
Age: 64
LOS: 2 days | Discharge: HOME | End: 2024-06-15
Attending: STUDENT IN AN ORGANIZED HEALTH CARE EDUCATION/TRAINING PROGRAM | Admitting: INTERNAL MEDICINE
Payer: MEDICARE

## 2024-06-13 ENCOUNTER — APPOINTMENT (OUTPATIENT)
Dept: GENERAL RADIOLOGY | Age: 64
End: 2024-06-13
Payer: MEDICARE

## 2024-06-13 DIAGNOSIS — S92.154A CLOSED NONDISPLACED AVULSION FRACTURE OF RIGHT TALUS, INITIAL ENCOUNTER: ICD-10-CM

## 2024-06-13 DIAGNOSIS — F10.10 ALCOHOL ABUSE: ICD-10-CM

## 2024-06-13 DIAGNOSIS — G24.3 CERVICAL DYSTONIA: Primary | ICD-10-CM

## 2024-06-13 DIAGNOSIS — E87.1 HYPONATREMIA: ICD-10-CM

## 2024-06-13 LAB
ALBUMIN SERPL-MCNC: 4.3 G/DL (ref 3.4–5)
ANION GAP SERPL CALCULATED.3IONS-SCNC: 11 MMOL/L (ref 3–16)
ANION GAP SERPL CALCULATED.3IONS-SCNC: 17 MMOL/L (ref 3–16)
BASOPHILS # BLD: 0 K/UL (ref 0–0.2)
BASOPHILS NFR BLD: 0.6 %
BILIRUB UR QL STRIP.AUTO: NEGATIVE
BUN SERPL-MCNC: 5 MG/DL (ref 7–20)
BUN SERPL-MCNC: 7 MG/DL (ref 7–20)
CALCIUM SERPL-MCNC: 9.7 MG/DL (ref 8.3–10.6)
CALCIUM SERPL-MCNC: 9.9 MG/DL (ref 8.3–10.6)
CHLORIDE SERPL-SCNC: 87 MMOL/L (ref 99–110)
CHLORIDE SERPL-SCNC: 88 MMOL/L (ref 99–110)
CLARITY UR: CLEAR
CO2 SERPL-SCNC: 21 MMOL/L (ref 21–32)
CO2 SERPL-SCNC: 27 MMOL/L (ref 21–32)
COLOR UR: YELLOW
CREAT SERPL-MCNC: <0.5 MG/DL (ref 0.6–1.2)
CREAT SERPL-MCNC: <0.5 MG/DL (ref 0.6–1.2)
DEPRECATED RDW RBC AUTO: 16.5 % (ref 12.4–15.4)
EOSINOPHIL # BLD: 0 K/UL (ref 0–0.6)
EOSINOPHIL NFR BLD: 0.1 %
ETHANOLAMINE SERPL-MCNC: 13 MG/DL (ref 0–0.08)
GFR SERPLBLD CREATININE-BSD FMLA CKD-EPI: >90 ML/MIN/{1.73_M2}
GFR SERPLBLD CREATININE-BSD FMLA CKD-EPI: >90 ML/MIN/{1.73_M2}
GLUCOSE SERPL-MCNC: 112 MG/DL (ref 70–99)
GLUCOSE SERPL-MCNC: 127 MG/DL (ref 70–99)
GLUCOSE UR STRIP.AUTO-MCNC: NEGATIVE MG/DL
HCT VFR BLD AUTO: 33.4 % (ref 36–48)
HGB BLD-MCNC: 11.6 G/DL (ref 12–16)
HGB UR QL STRIP.AUTO: NEGATIVE
KETONES UR STRIP.AUTO-MCNC: 15 MG/DL
LEUKOCYTE ESTERASE UR QL STRIP.AUTO: NEGATIVE
LYMPHOCYTES # BLD: 0.6 K/UL (ref 1–5.1)
LYMPHOCYTES NFR BLD: 11.5 %
MAGNESIUM SERPL-MCNC: 1.7 MG/DL (ref 1.8–2.4)
MCH RBC QN AUTO: 29.6 PG (ref 26–34)
MCHC RBC AUTO-ENTMCNC: 34.6 G/DL (ref 31–36)
MCV RBC AUTO: 85.6 FL (ref 80–100)
MONOCYTES # BLD: 0.4 K/UL (ref 0–1.3)
MONOCYTES NFR BLD: 6.9 %
NEUTROPHILS # BLD: 4.6 K/UL (ref 1.7–7.7)
NEUTROPHILS NFR BLD: 80.9 %
NITRITE UR QL STRIP.AUTO: NEGATIVE
OSMOLALITY SERPL: 269 MOSM/KG (ref 278–305)
PH UR STRIP.AUTO: 6.5 [PH] (ref 5–8)
PHOSPHATE SERPL-MCNC: 2.5 MG/DL (ref 2.5–4.9)
PLATELET # BLD AUTO: 171 K/UL (ref 135–450)
PMV BLD AUTO: 7.9 FL (ref 5–10.5)
POTASSIUM SERPL-SCNC: 3.4 MMOL/L (ref 3.5–5.1)
POTASSIUM SERPL-SCNC: 4.5 MMOL/L (ref 3.5–5.1)
PROT UR STRIP.AUTO-MCNC: NEGATIVE MG/DL
RBC # BLD AUTO: 3.91 M/UL (ref 4–5.2)
SODIUM SERPL-SCNC: 125 MMOL/L (ref 136–145)
SODIUM SERPL-SCNC: 126 MMOL/L (ref 136–145)
SP GR UR STRIP.AUTO: 1.02 (ref 1–1.03)
UA COMPLETE W REFLEX CULTURE PNL UR: ABNORMAL
UA DIPSTICK W REFLEX MICRO PNL UR: ABNORMAL
URATE SERPL-MCNC: 4.3 MG/DL (ref 2.6–6)
URN SPEC COLLECT METH UR: ABNORMAL
UROBILINOGEN UR STRIP-ACNC: 0.2 E.U./DL
WBC # BLD AUTO: 5.6 K/UL (ref 4–11)

## 2024-06-13 PROCEDURE — 6360000002 HC RX W HCPCS: Performed by: INTERNAL MEDICINE

## 2024-06-13 PROCEDURE — 99285 EMERGENCY DEPT VISIT HI MDM: CPT

## 2024-06-13 PROCEDURE — 96374 THER/PROPH/DIAG INJ IV PUSH: CPT

## 2024-06-13 PROCEDURE — 82077 ASSAY SPEC XCP UR&BREATH IA: CPT

## 2024-06-13 PROCEDURE — 99223 1ST HOSP IP/OBS HIGH 75: CPT | Performed by: INTERNAL MEDICINE

## 2024-06-13 PROCEDURE — 6360000002 HC RX W HCPCS

## 2024-06-13 PROCEDURE — 6360000002 HC RX W HCPCS: Performed by: PHYSICIAN ASSISTANT

## 2024-06-13 PROCEDURE — 81003 URINALYSIS AUTO W/O SCOPE: CPT

## 2024-06-13 PROCEDURE — 6370000000 HC RX 637 (ALT 250 FOR IP): Performed by: INTERNAL MEDICINE

## 2024-06-13 PROCEDURE — 2500000003 HC RX 250 WO HCPCS: Performed by: PHYSICIAN ASSISTANT

## 2024-06-13 PROCEDURE — 80069 RENAL FUNCTION PANEL: CPT

## 2024-06-13 PROCEDURE — 6360000002 HC RX W HCPCS: Performed by: NURSE PRACTITIONER

## 2024-06-13 PROCEDURE — 36415 COLL VENOUS BLD VENIPUNCTURE: CPT

## 2024-06-13 PROCEDURE — 2580000003 HC RX 258: Performed by: INTERNAL MEDICINE

## 2024-06-13 PROCEDURE — 83930 ASSAY OF BLOOD OSMOLALITY: CPT

## 2024-06-13 PROCEDURE — 85025 COMPLETE CBC W/AUTO DIFF WBC: CPT

## 2024-06-13 PROCEDURE — 73610 X-RAY EXAM OF ANKLE: CPT

## 2024-06-13 PROCEDURE — 1200000000 HC SEMI PRIVATE

## 2024-06-13 PROCEDURE — 6370000000 HC RX 637 (ALT 250 FOR IP): Performed by: PHYSICIAN ASSISTANT

## 2024-06-13 PROCEDURE — 73630 X-RAY EXAM OF FOOT: CPT

## 2024-06-13 PROCEDURE — 84550 ASSAY OF BLOOD/URIC ACID: CPT

## 2024-06-13 PROCEDURE — 2580000003 HC RX 258: Performed by: PHYSICIAN ASSISTANT

## 2024-06-13 PROCEDURE — 83735 ASSAY OF MAGNESIUM: CPT

## 2024-06-13 PROCEDURE — 84300 ASSAY OF URINE SODIUM: CPT

## 2024-06-13 PROCEDURE — 83935 ASSAY OF URINE OSMOLALITY: CPT

## 2024-06-13 RX ORDER — PANTOPRAZOLE SODIUM 40 MG/1
40 TABLET, DELAYED RELEASE ORAL
Status: DISCONTINUED | OUTPATIENT
Start: 2024-06-14 | End: 2024-06-15 | Stop reason: HOSPADM

## 2024-06-13 RX ORDER — MULTIVITAMIN WITH IRON
100 TABLET ORAL DAILY
Status: DISCONTINUED | OUTPATIENT
Start: 2024-06-13 | End: 2024-06-15 | Stop reason: HOSPADM

## 2024-06-13 RX ORDER — PROMETHAZINE HYDROCHLORIDE 25 MG/ML
12.5 INJECTION, SOLUTION INTRAMUSCULAR; INTRAVENOUS ONCE
Status: COMPLETED | OUTPATIENT
Start: 2024-06-13 | End: 2024-06-13

## 2024-06-13 RX ORDER — LORAZEPAM 1 MG/1
4 TABLET ORAL
Status: DISCONTINUED | OUTPATIENT
Start: 2024-06-13 | End: 2024-06-15 | Stop reason: HOSPADM

## 2024-06-13 RX ORDER — LORAZEPAM 1 MG/1
3 TABLET ORAL
Status: DISCONTINUED | OUTPATIENT
Start: 2024-06-13 | End: 2024-06-15 | Stop reason: HOSPADM

## 2024-06-13 RX ORDER — SODIUM CHLORIDE 0.9 % (FLUSH) 0.9 %
5-40 SYRINGE (ML) INJECTION EVERY 12 HOURS SCHEDULED
Status: DISCONTINUED | OUTPATIENT
Start: 2024-06-13 | End: 2024-06-15 | Stop reason: HOSPADM

## 2024-06-13 RX ORDER — POTASSIUM CHLORIDE 7.45 MG/ML
10 INJECTION INTRAVENOUS PRN
Status: DISCONTINUED | OUTPATIENT
Start: 2024-06-13 | End: 2024-06-15 | Stop reason: HOSPADM

## 2024-06-13 RX ORDER — MULTIVITAMIN WITH IRON
1 TABLET ORAL DAILY
Status: DISCONTINUED | OUTPATIENT
Start: 2024-06-13 | End: 2024-06-15 | Stop reason: HOSPADM

## 2024-06-13 RX ORDER — SODIUM CHLORIDE 9 MG/ML
INJECTION, SOLUTION INTRAVENOUS PRN
Status: DISCONTINUED | OUTPATIENT
Start: 2024-06-13 | End: 2024-06-15 | Stop reason: HOSPADM

## 2024-06-13 RX ORDER — POLYETHYLENE GLYCOL 3350 17 G/17G
17 POWDER, FOR SOLUTION ORAL DAILY PRN
Status: DISCONTINUED | OUTPATIENT
Start: 2024-06-13 | End: 2024-06-15 | Stop reason: HOSPADM

## 2024-06-13 RX ORDER — GAUZE BANDAGE 2" X 2"
100 BANDAGE TOPICAL 2 TIMES DAILY
Status: DISCONTINUED | OUTPATIENT
Start: 2024-06-13 | End: 2024-06-15 | Stop reason: HOSPADM

## 2024-06-13 RX ORDER — ACETAMINOPHEN 500 MG
1000 TABLET ORAL EVERY 6 HOURS PRN
COMMUNITY
Start: 2021-10-11

## 2024-06-13 RX ORDER — LORAZEPAM 0.5 MG/1
0.5 TABLET ORAL ONCE
Status: COMPLETED | OUTPATIENT
Start: 2024-06-13 | End: 2024-06-13

## 2024-06-13 RX ORDER — SODIUM CHLORIDE 9 MG/ML
INJECTION, SOLUTION INTRAVENOUS CONTINUOUS
Status: DISCONTINUED | OUTPATIENT
Start: 2024-06-13 | End: 2024-06-14

## 2024-06-13 RX ORDER — KETOROLAC TROMETHAMINE 15 MG/ML
15 INJECTION, SOLUTION INTRAMUSCULAR; INTRAVENOUS EVERY 6 HOURS PRN
Status: DISCONTINUED | OUTPATIENT
Start: 2024-06-13 | End: 2024-06-14

## 2024-06-13 RX ORDER — ENOXAPARIN SODIUM 100 MG/ML
40 INJECTION SUBCUTANEOUS DAILY
Status: DISCONTINUED | OUTPATIENT
Start: 2024-06-13 | End: 2024-06-15 | Stop reason: HOSPADM

## 2024-06-13 RX ORDER — ZOLPIDEM TARTRATE 5 MG/1
5 TABLET ORAL NIGHTLY PRN
Status: DISCONTINUED | OUTPATIENT
Start: 2024-06-13 | End: 2024-06-15 | Stop reason: HOSPADM

## 2024-06-13 RX ORDER — HYDROMORPHONE HYDROCHLORIDE 1 MG/ML
0.5 INJECTION, SOLUTION INTRAMUSCULAR; INTRAVENOUS; SUBCUTANEOUS EVERY 4 HOURS PRN
Status: DISCONTINUED | OUTPATIENT
Start: 2024-06-13 | End: 2024-06-15 | Stop reason: HOSPADM

## 2024-06-13 RX ORDER — ONDANSETRON 2 MG/ML
4 INJECTION INTRAMUSCULAR; INTRAVENOUS EVERY 6 HOURS PRN
Status: DISCONTINUED | OUTPATIENT
Start: 2024-06-13 | End: 2024-06-15 | Stop reason: HOSPADM

## 2024-06-13 RX ORDER — ACETAMINOPHEN 650 MG/1
650 SUPPOSITORY RECTAL EVERY 6 HOURS PRN
Status: DISCONTINUED | OUTPATIENT
Start: 2024-06-13 | End: 2024-06-15 | Stop reason: HOSPADM

## 2024-06-13 RX ORDER — LORAZEPAM 2 MG/ML
1 INJECTION INTRAMUSCULAR
Status: DISCONTINUED | OUTPATIENT
Start: 2024-06-13 | End: 2024-06-15 | Stop reason: HOSPADM

## 2024-06-13 RX ORDER — OXYCODONE HYDROCHLORIDE 5 MG/1
10 TABLET ORAL EVERY 4 HOURS PRN
Status: DISCONTINUED | OUTPATIENT
Start: 2024-06-13 | End: 2024-06-15 | Stop reason: HOSPADM

## 2024-06-13 RX ORDER — LORAZEPAM 1 MG/1
2 TABLET ORAL
Status: DISCONTINUED | OUTPATIENT
Start: 2024-06-13 | End: 2024-06-15 | Stop reason: HOSPADM

## 2024-06-13 RX ORDER — MAGNESIUM SULFATE IN WATER 40 MG/ML
2000 INJECTION, SOLUTION INTRAVENOUS ONCE
Status: COMPLETED | OUTPATIENT
Start: 2024-06-13 | End: 2024-06-13

## 2024-06-13 RX ORDER — SODIUM CHLORIDE 0.9 % (FLUSH) 0.9 %
5-40 SYRINGE (ML) INJECTION PRN
Status: DISCONTINUED | OUTPATIENT
Start: 2024-06-13 | End: 2024-06-15 | Stop reason: HOSPADM

## 2024-06-13 RX ORDER — MAGNESIUM SULFATE IN WATER 40 MG/ML
2000 INJECTION, SOLUTION INTRAVENOUS PRN
Status: DISCONTINUED | OUTPATIENT
Start: 2024-06-13 | End: 2024-06-15 | Stop reason: HOSPADM

## 2024-06-13 RX ORDER — VALSARTAN 80 MG/1
80 TABLET ORAL DAILY
Status: DISCONTINUED | OUTPATIENT
Start: 2024-06-14 | End: 2024-06-14

## 2024-06-13 RX ORDER — ONDANSETRON 4 MG/1
4 TABLET, ORALLY DISINTEGRATING ORAL EVERY 8 HOURS PRN
Status: DISCONTINUED | OUTPATIENT
Start: 2024-06-13 | End: 2024-06-15 | Stop reason: HOSPADM

## 2024-06-13 RX ORDER — POTASSIUM CHLORIDE 20 MEQ/1
40 TABLET, EXTENDED RELEASE ORAL ONCE
Status: DISCONTINUED | OUTPATIENT
Start: 2024-06-13 | End: 2024-06-15 | Stop reason: HOSPADM

## 2024-06-13 RX ORDER — LORAZEPAM 1 MG/1
2 TABLET ORAL NIGHTLY
Status: DISCONTINUED | OUTPATIENT
Start: 2024-06-13 | End: 2024-06-15 | Stop reason: HOSPADM

## 2024-06-13 RX ORDER — POTASSIUM CHLORIDE 20 MEQ/1
40 TABLET, EXTENDED RELEASE ORAL PRN
Status: DISCONTINUED | OUTPATIENT
Start: 2024-06-13 | End: 2024-06-15 | Stop reason: HOSPADM

## 2024-06-13 RX ORDER — LORAZEPAM 1 MG/1
1 TABLET ORAL
Status: DISCONTINUED | OUTPATIENT
Start: 2024-06-13 | End: 2024-06-15 | Stop reason: HOSPADM

## 2024-06-13 RX ORDER — OXYCODONE HYDROCHLORIDE 5 MG/1
5 TABLET ORAL EVERY 4 HOURS PRN
Status: DISCONTINUED | OUTPATIENT
Start: 2024-06-13 | End: 2024-06-15 | Stop reason: HOSPADM

## 2024-06-13 RX ORDER — MORPHINE SULFATE 2 MG/ML
2 INJECTION, SOLUTION INTRAMUSCULAR; INTRAVENOUS EVERY 4 HOURS PRN
Status: DISCONTINUED | OUTPATIENT
Start: 2024-06-13 | End: 2024-06-13

## 2024-06-13 RX ORDER — ACETAMINOPHEN 325 MG/1
650 TABLET ORAL EVERY 6 HOURS PRN
Status: DISCONTINUED | OUTPATIENT
Start: 2024-06-13 | End: 2024-06-15 | Stop reason: HOSPADM

## 2024-06-13 RX ORDER — LORAZEPAM 2 MG/ML
3 INJECTION INTRAMUSCULAR
Status: DISCONTINUED | OUTPATIENT
Start: 2024-06-13 | End: 2024-06-15 | Stop reason: HOSPADM

## 2024-06-13 RX ORDER — LORAZEPAM 2 MG/ML
4 INJECTION INTRAMUSCULAR
Status: DISCONTINUED | OUTPATIENT
Start: 2024-06-13 | End: 2024-06-15 | Stop reason: HOSPADM

## 2024-06-13 RX ORDER — LORAZEPAM 2 MG/ML
2 INJECTION INTRAMUSCULAR
Status: DISCONTINUED | OUTPATIENT
Start: 2024-06-13 | End: 2024-06-15 | Stop reason: HOSPADM

## 2024-06-13 RX ADMIN — HYDROMORPHONE HYDROCHLORIDE 0.5 MG: 1 INJECTION, SOLUTION INTRAMUSCULAR; INTRAVENOUS; SUBCUTANEOUS at 22:43

## 2024-06-13 RX ADMIN — OXYCODONE HYDROCHLORIDE 10 MG: 5 TABLET ORAL at 19:42

## 2024-06-13 RX ADMIN — LORAZEPAM 2 MG: 1 TABLET ORAL at 22:10

## 2024-06-13 RX ADMIN — ONDANSETRON 4 MG: 2 INJECTION INTRAMUSCULAR; INTRAVENOUS at 15:06

## 2024-06-13 RX ADMIN — PROMETHAZINE HYDROCHLORIDE 12.5 MG: 25 INJECTION INTRAMUSCULAR; INTRAVENOUS at 20:05

## 2024-06-13 RX ADMIN — Medication 100 MG: at 17:48

## 2024-06-13 RX ADMIN — THERA TABS 1 TABLET: TAB at 15:11

## 2024-06-13 RX ADMIN — SODIUM CHLORIDE: 9 INJECTION, SOLUTION INTRAVENOUS at 17:48

## 2024-06-13 RX ADMIN — LORAZEPAM 3 MG: 1 TABLET ORAL at 15:10

## 2024-06-13 RX ADMIN — ZOLPIDEM TARTRATE 5 MG: 5 TABLET ORAL at 22:10

## 2024-06-13 RX ADMIN — THIAMINE HYDROCHLORIDE: 100 INJECTION, SOLUTION INTRAMUSCULAR; INTRAVENOUS at 13:28

## 2024-06-13 RX ADMIN — Medication 100 MG: at 22:10

## 2024-06-13 RX ADMIN — MAGNESIUM SULFATE HEPTAHYDRATE 2000 MG: 40 INJECTION, SOLUTION INTRAVENOUS at 15:09

## 2024-06-13 RX ADMIN — LORAZEPAM 0.5 MG: 0.5 TABLET ORAL at 13:29

## 2024-06-13 RX ADMIN — OXYCODONE HYDROCHLORIDE 10 MG: 5 TABLET ORAL at 15:11

## 2024-06-13 RX ADMIN — Medication 5000 UNITS: at 17:48

## 2024-06-13 RX ADMIN — POTASSIUM BICARBONATE 40 MEQ: 782 TABLET, EFFERVESCENT ORAL at 17:48

## 2024-06-13 ASSESSMENT — PAIN - FUNCTIONAL ASSESSMENT
PAIN_FUNCTIONAL_ASSESSMENT: ACTIVITIES ARE NOT PREVENTED
PAIN_FUNCTIONAL_ASSESSMENT: 0-10
PAIN_FUNCTIONAL_ASSESSMENT: ACTIVITIES ARE NOT PREVENTED
PAIN_FUNCTIONAL_ASSESSMENT: ACTIVITIES ARE NOT PREVENTED

## 2024-06-13 ASSESSMENT — PAIN DESCRIPTION - ORIENTATION
ORIENTATION: RIGHT

## 2024-06-13 ASSESSMENT — ENCOUNTER SYMPTOMS
ABDOMINAL PAIN: 0
VOMITING: 0
SHORTNESS OF BREATH: 0
BACK PAIN: 0
DIARRHEA: 0
CONSTIPATION: 0
NAUSEA: 0

## 2024-06-13 ASSESSMENT — PAIN DESCRIPTION - ONSET
ONSET: ON-GOING
ONSET: ON-GOING

## 2024-06-13 ASSESSMENT — PAIN DESCRIPTION - LOCATION
LOCATION: ANKLE

## 2024-06-13 ASSESSMENT — PAIN SCALES - GENERAL
PAINLEVEL_OUTOF10: 2
PAINLEVEL_OUTOF10: 9
PAINLEVEL_OUTOF10: 8
PAINLEVEL_OUTOF10: 6
PAINLEVEL_OUTOF10: 8
PAINLEVEL_OUTOF10: 10
PAINLEVEL_OUTOF10: 2
PAINLEVEL_OUTOF10: 9
PAINLEVEL_OUTOF10: 2

## 2024-06-13 ASSESSMENT — PAIN DESCRIPTION - FREQUENCY
FREQUENCY: CONTINUOUS

## 2024-06-13 ASSESSMENT — PAIN DESCRIPTION - PAIN TYPE
TYPE: ACUTE PAIN

## 2024-06-13 ASSESSMENT — PAIN DESCRIPTION - DESCRIPTORS
DESCRIPTORS: ACHING;THROBBING
DESCRIPTORS: ACHING
DESCRIPTORS: ACHING;THROBBING

## 2024-06-13 NOTE — ED PROVIDER NOTES
THE [facility]  EMERGENCY DEPARTMENT ENCOUNTER          PHYSICIAN ASSISTANT NOTE       Date of evaluation: 6/13/2024    Chief Complaint     Ankle Pain (Right ankle pain, pt reports she was going down steps and ankle gave out )    History of Present Illness     Jennifer Ulrich is a 64 y.o. female who presents with a past medical history of alcohol use disorder resulting in periodic hyponatremia who follows closely with Dr. Noriega who presents today with a chief complaint of right ankle pain.  The patient states that yesterday at 1600 she was going down steps and states that her right leg gave out causing her ankle to invert and she fell onto her bottom.  She states she did not hit her head or lose consciousness.  She denies any nausea or vomiting.  She states that she has been shaky over the past day or so.  She admits to drinking about 5 shots of bourbon daily for about 1 week now.  She denies any nausea or vomiting.  She states that she is not interested in stopping drinking.  The patient states that she has shaking, generally weak and sometimes she gets like this when she is hyponatremic.  She is supposed to see nephrologist next week for labs.  The patient's  called her nephrologist who is aware that the patient is currently here.  The patient states that her last drink was around 0400 this morning.  She has an apparent parathyroid gland and chest and is planning to have surgery to remove this.  Patient states that this has triggered her to drink more since she found this out.  Patient has a history of cervical dystonia and is on Ativan for this.  Specifically denies fevers, chills, chest pain, shortness of breath, abdominal pain, nausea/vomiting/diarrhea.  Denies any urinary symptoms.  Denies any blurred or double vision or headache.    ASSESSMENT / PLAN  (MEDICAL DECISION MAKING)     INITIAL VITALS: BP: (!) 166/94, Temp: 98.5 °F (36.9 °C), Pulse: (!) 102, Respirations: 18, SpO2: 95 %    Jennifer PADILLA      Is this patient to be included in the SEP-1 core measure? No Exclusion criteria - the patient is NOT to be included for SEP-1 Core Measure due to: 2+ SIRS criteria are not met    Medical Decision Making  Amount and/or Complexity of Data Reviewed  Labs: ordered.  Radiology: ordered.    Risk  Prescription drug management.  Decision regarding hospitalization.      This patient was also evaluated by the attending physician. All care plans were discussed and agreed upon.    Clinical Impression     1. Hyponatremia    2. Alcohol abuse    3. Closed nondisplaced avulsion fracture of right talus, initial encounter      Disposition     PATIENT REFERRED TO:  No follow-up provider specified.    DISCHARGE MEDICATIONS:  New Prescriptions    No medications on file     DISPOSITION Admitted 06/13/2024 02:16:41 PM    Anna Franz PA-C    Diagnostic Results and Other Data     RADIOLOGY:  XR ANKLE RIGHT (MIN 3 VIEWS)   Final Result   1. Avulsion fracture or possible impaction fracture along the dorsal aspect of   the talus best identified on the lateral projection. Adjacent soft tissue   swelling.   2. No fracture or dislocation of the distal foot.      Electronically signed by Joseph Garcia      XR FOOT RIGHT (MIN 3 VIEWS)   Final Result   1. Avulsion fracture or possible impaction fracture along the dorsal aspect of   the talus best identified on the lateral projection. Adjacent soft tissue   swelling.   2. No fracture or dislocation of the distal foot.      Electronically signed by Joseph Garcia        LABS:   Results for orders placed or performed during the hospital encounter of 06/13/24   CBC with Auto Differential   Result Value Ref Range    WBC 5.6 4.0 - 11.0 K/uL    RBC 3.91 (L) 4.00 - 5.20 M/uL    Hemoglobin 11.6 (L) 12.0 - 16.0 g/dL    Hematocrit 33.4 (L) 36.0 - 48.0 %    MCV 85.6 80.0 - 100.0 fL    MCH 29.6 26.0 - 34.0 pg    MCHC 34.6 31.0 - 36.0 g/dL    RDW 16.5 (H) 12.4 - 15.4 %    Platelets 171 135 - 450 K/uL

## 2024-06-13 NOTE — H&P
V2.0  History and Physical      Name:  Jennifer Ulrich /Age/Sex: 1960  (64 y.o. female)   MRN & CSN:  [id number] & [id number] Encounter Date/Time: 2024 2:17 PM EDT   Location:  [facility] PCP: Lilia Grubbs MD       Hospital Day: 1    Assessment and Plan:     Hospital Problems             Last Modified POA    * (Principal) Hyponatremia 2024 Yes     Mechanical fall, no reports of LOC leading to right ankle fracture suspect patient was intoxicated at the time of fall  Right ankle avulsion/impaction fracture of the dorsal aspect of the talus  RICE treatment  Orthopedic consult, recommended Boot  PT/OT    Alcohol dependence with withdrawal  CIWA protocol  Multivitamin, thiamine  If high CIWA scores add scheduled Librium  Case management consult    Hyponatremia acute on chronic, low serum osm  Sodium 125 admission  Urine lites serum osmolarity urine osmolarity has been ordered  Nephrology consult further evaluation recommendation  Monitor sodium closely    Cervical dystonia, continue home Ambien/Ativan  GERD, continue home PPI    Hypertension/hypothyroidism, continue home medications    Disposition:   Current Living situation: Home with spouse  Expected Disposition: home, they will consider going to there sons home    Estimated D/C: 06/15/24    Diet Regular   DVT Prophylaxis [x] Lovenox, []  Heparin, [] SCDs, [] Ambulation,  [] Eliquis, [] Xarelto, [] Coumadin   Code Status Full Code   Surrogate Decision Maker/ POA spouse     Personally reviewed Lab Studies and Imaging   Discussed management of the case with ER physician, decision to admit  Discussed with Dr. Rachel      History from:     patient, electronic medical record    History of Present Illness:     Chief Complaint:   Chief Complaint   Patient presents with    Ankle Pain     Right ankle pain, pt reports she was going down steps and ankle gave out        64 y.o. female   a past medical history of alcohol abuse/withdrawal, cervical      Troponin: No results found for: \"TROPONINT\"  Lactic Acid: No results for input(s): \"LACTA\" in the last 72 hours.  BNP: No results for input(s): \"PROBNP\" in the last 72 hours.  UA:  Lab Results   Component Value Date/Time    NITRU Negative 04/23/2024 05:45 PM    COLORU Yellow 04/23/2024 05:45 PM    PHUR 5.0 04/24/2024 03:37 AM    WBCUA 10-20 11/26/2022 06:15 PM    RBCUA 3-4 11/26/2022 06:15 PM    MUCUS Rare 11/26/2022 06:15 PM    BACTERIA 1+ 11/26/2022 06:15 PM    CLARITYU Clear 04/23/2024 05:45 PM    LEUKOCYTESUR Negative 04/23/2024 05:45 PM    UROBILINOGEN 0.2 04/23/2024 05:45 PM    BILIRUBINUR Negative 04/23/2024 05:45 PM    BLOODU Negative 04/23/2024 05:45 PM    GLUCOSEU Negative 04/23/2024 05:45 PM    KETUA Negative 04/23/2024 05:45 PM    AMORPHOUS 2+ 11/26/2022 06:15 PM     Urine Cultures:   Lab Results   Component Value Date/Time    LABURIN  11/26/2022 07:00 PM     <50,000 CFU/ml mixed skin/urogenital jose manuel. No further workup     Blood Cultures:   Lab Results   Component Value Date/Time    BC No Growth after 4 days of incubation. 11/26/2022 07:44 PM     Lab Results   Component Value Date/Time    BLOODCULT2 No Growth after 4 days of incubation. 11/26/2022 07:33 PM     Organism:   Lab Results   Component Value Date/Time    ORG C. difficile toxin B gene detected 05/15/2021 08:16 PM       Imaging/Diagnostics Last 24 Hours   XR ANKLE RIGHT (MIN 3 VIEWS)    Result Date: 6/13/2024  History: , pain, right foot and ankle injury.. XR ANKLE RIGHT (MIN 3 VIEWS), XR FOOT RIGHT (MIN 3 VIEWS), FINDINGS: Ankle: Cortical irregularity along the dorsal aspect of the anterior process of the talus compatible with avulsion injury or impaction injury. No dislocation identified. No radiopaque foreign body. Foot: No fracture or dislocation identified in the distal foot.     1. Avulsion fracture or possible impaction fracture along the dorsal aspect of the talus best identified on the lateral projection. Adjacent soft tissue

## 2024-06-13 NOTE — CONSULTS
Nephrology Consult Note                                                                                                                                                                                                                                                                                                       Office: [phone]       Fax:  [phone]      Patient's Name: Jennifer Ulrich  2:21 PM  6/13/2024    Reason for Consult:  hyponatremia  Requesting Physician:  Lilia Grubbs MD      Chief Complaint:  right foot hurts     Subjective     HPI:    Jennifer Ulrich is a 64 y.o. female with hx alcohol use disorder and hyponatremia. Presented to ED with right ankle plain which occurred when her right leg gave out causing ankle to invert. Denies LOC or head trauma. Denies N/V, but reports feeling shaking and weak which she attributes to low sodium as she has had this problem in the past due to alcohol use.. She has been drinking 5-7 shots bourbon daily, last drink this AM 0400. She reports she has previously taken a break from alcohol for year before, and wants to quit alcohol permanently after this. She also reports reduced food intake over past week as well. Denies diarrhea, denies starting any new medications.     Tachycardic but HDS on arrival. CIWA elev 11. Right ankle avulsion vs impaction fx of talus    Na 125, recent baseline 130-131.   K low 3.4, baseline wnl.   Cr and GFR stable at baseline.    Past Medical History:   Diagnosis Date    C. difficile diarrhea 05/15/2021    CAD (coronary artery disease)     Cervical dystonia     CHF (congestive heart failure) (HCC)     Dental crown present     upper right and bonding    Fatty liver     Hyperlipidemia     Hypertension     Lichen sclerosus     PONV (postoperative nausea and vomiting)        Past Surgical History:   Procedure Laterality Date    BREAST SURGERY      COLONOSCOPY      COLONOSCOPY N/A 7/2/2019    COLONOSCOPY WITH BIOPSY performed  Sensation intact.   Psychiatric: anxious.      Labs:  CBC:   Recent Labs     06/13/24  1218   WBC 5.6   HGB 11.6*        BMP:    Recent Labs     06/13/24  1218   *   K 3.4*   CL 87*   CO2 21   BUN 7   CREATININE <0.5*   GLUCOSE 127*     Ca/Mg/Phos:   Recent Labs     06/13/24  1218   CALCIUM 9.7   MG 1.70*     Hepatic: No results for input(s): \"AST\", \"ALT\", \"BILITOT\", \"ALKPHOS\" in the last 72 hours.    Invalid input(s): \"ALB\"  Troponin: No results for input(s): \"TROPONINI\" in the last 72 hours.  BNP: No results for input(s): \"BNP\" in the last 72 hours.  Lipids: No results for input(s): \"CHOL\", \"TRIG\", \"HDL\" in the last 72 hours.    Invalid input(s): \"LDLCALC\", \"LABVLDL\"  ABGs: No results for input(s): \"PHART\", \"PO2ART\", \"IZH5TFS\" in the last 72 hours.  INR: No results for input(s): \"INR\" in the last 72 hours.  UA:No results for input(s): \"COLORU\", \"CLARITYU\", \"GLUCOSEU\", \"BILIRUBINUR\", \"KETUA\", \"SPECGRAV\", \"BLOODU\", \"PHUR\", \"PROTEINU\", \"UROBILINOGEN\", \"NITRU\", \"LEUKOCYTESUR\", \"URINETYPE\" in the last 72 hours.    Invalid input(s): \"LABMICR\"   Urine Microscopic: No results for input(s): \"LABCAST\", \"BACTERIA\", \"COMU\", \"HYALCAST\", \"WBCUA\", \"RBCUA\" in the last 72 hours.    Invalid input(s): \"EPIU\"  Urine Culture: No results for input(s): \"LABURIN\" in the last 72 hours.  Urine Chemistry: No results for input(s): \"CLUR\", \"LABCREA\", \"PROTEINUR\", \"NAUR\" in the last 72 hours.      IMAGING:  XR ANKLE RIGHT (MIN 3 VIEWS)   Final Result   1. Avulsion fracture or possible impaction fracture along the dorsal aspect of   the talus best identified on the lateral projection. Adjacent soft tissue   swelling.   2. No fracture or dislocation of the distal foot.      Electronically signed by Joseph Garcia      XR FOOT RIGHT (MIN 3 VIEWS)   Final Result   1. Avulsion fracture or possible impaction fracture along the dorsal aspect of   the talus best identified on the lateral projection. Adjacent soft tissue   swelling.   2.

## 2024-06-13 NOTE — CONSULTS
Department of Orthopedic Surgery  Attending Consult Note        CHIEF COMPLAINT:  R ankle pain      HISTORY OF PRESENT ILLNESS:                The patient is a 64 y.o. female who is being seen in consultation for evaluation of R ankle pain after a fall yesterday.  Difficulty bearing weight.  Got \"shaky legs\" and fell.  Admitted for hyponatremia.  Presented to ER today.  Denies other acute orthopedic injury.  Does report h/o daily ETOH use. No significant n/t.    Past Medical History:        Diagnosis Date    C. difficile diarrhea 05/15/2021    CAD (coronary artery disease)     Cervical dystonia     CHF (congestive heart failure) (HCC)     Dental crown present     upper right and bonding    Fatty liver     Hyperlipidemia     Hypertension     Lichen sclerosus     PONV (postoperative nausea and vomiting)      Past Surgical History:        Procedure Laterality Date    BREAST SURGERY      COLONOSCOPY      COLONOSCOPY N/A 7/2/2019    COLONOSCOPY WITH BIOPSY performed by Jayden Lund MD at [facility] ENDOSCOPY    COLONOSCOPY  7/2/2019    COLONOSCOPY POLYPECTOMY SNARE/COLD BIOPSY performed by Jayden Lund MD at [facility] ENDOSCOPY    COLONOSCOPY N/A 8/8/2023    COLONOSCOPY POLYPECTOMY SNARE/COLD BIOPSY performed by Jayden Lund MD at [facility] ENDOSCOPY    ENDOMETRIAL ABLATION      ENDOSCOPY, COLON, DIAGNOSTIC      UPPER GASTROINTESTINAL ENDOSCOPY N/A 8/8/2023    EGD BIOPSY gastric polyp bx GE junction bx eval  ring performed by Jayden Lund MD at [facility] ENDOSCOPY    UPPER GASTROINTESTINAL ENDOSCOPY N/A 8/8/2023    EGD DILATION BALLOON 18,19,20mm balloon dilation performed by Jayden Lund MD at [facility] ENDOSCOPY     Current Medications:   Current Facility-Administered Medications: potassium chloride (KLOR-CON M) extended release tablet 40 mEq, 40 mEq, Oral, Once  [START ON 6/14/2024] pantoprazole (PROTONIX) tablet 40 mg, 40 mg, Oral, QAM AC  folic acid (FOLVITE) tablet 700 mcg, 700 mcg, Oral, Daily  LORazepam    Lab Results   Component Value Date/Time     06/13/2024 12:18 PM    K 3.4 06/13/2024 12:18 PM    CL 87 06/13/2024 12:18 PM    CO2 21 06/13/2024 12:18 PM    BUN 7 06/13/2024 12:18 PM    CREATININE <0.5 06/13/2024 12:18 PM    CALCIUM 9.7 06/13/2024 12:18 PM    GFRAA >60 07/12/2022 08:20 AM    LABGLOM >90 06/13/2024 12:18 PM    LABGLOM >90 04/24/2024 05:20 AM    GLUCOSE 127 06/13/2024 12:18 PM     Uric Acid:    Lab Results   Component Value Date/Time    URICACID 4.3 06/13/2024 12:18 PM     PT/INR:    Lab Results   Component Value Date/Time    PROTIME 13.4 11/26/2022 10:55 AM    INR 1.03 11/26/2022 10:55 AM     PTT:  No results found for: \"APTT\"[APTT}    IMPRESSION/RECOMMENDATIONS:    R ankle talus dorsal avulsion fx/sprain    Personally reviewed and interpreted xrays R ankle and foot and small dorsal talus avulsion fracture  Exam c/w ankle sprain, talus avulsion (small buck)  WBAT in boot  Analgesics PRN  F/u in office in 2-3 weeks

## 2024-06-13 NOTE — ED NOTES
ED TO INPATIENT SBAR HANDOFF    Patient Name: Jennifer Ulrich   :  1960  64 y.o.   MRN:  [id number]  Preferred Name  Jennifer  ED Room #:  B16/B16-16  Family/Caregiver Present yes   Restraints    Sitter no   Sepsis Risk Score Sepsis Risk Score: 1.21    Situation  Code Status: Prior .    Allergies: Atorvastatin, Iodine, Pravastatin, Rosuvastatin, Shellfish allergy, Lunesta [eszopiclone], Sulfa antibiotics, and Tylenol [acetaminophen]  Weight: Patient Vitals for the past 96 hrs (Last 3 readings):   Weight   24 1136 86.6 kg (190 lb 14.4 oz)     Arrived from: home  Chief Complaint:   Chief Complaint   Patient presents with    Ankle Pain     Right ankle pain, pt reports she was going down steps and ankle gave out      Hospital Problem/Diagnosis:  Principal Problem:    Hyponatremia  Resolved Problems:    * No resolved hospital problems. *    Imaging:   XR ANKLE RIGHT (MIN 3 VIEWS)   Final Result   1. Avulsion fracture or possible impaction fracture along the dorsal aspect of   the talus best identified on the lateral projection. Adjacent soft tissue   swelling.   2. No fracture or dislocation of the distal foot.      Electronically signed by Joseph Garcia      XR FOOT RIGHT (MIN 3 VIEWS)   Final Result   1. Avulsion fracture or possible impaction fracture along the dorsal aspect of   the talus best identified on the lateral projection. Adjacent soft tissue   swelling.   2. No fracture or dislocation of the distal foot.      Electronically signed by Joseph Garcia        Abnormal labs:   Abnormal Labs Reviewed   CBC WITH AUTO DIFFERENTIAL - Abnormal; Notable for the following components:       Result Value    RBC 3.91 (*)     Hemoglobin 11.6 (*)     Hematocrit 33.4 (*)     RDW 16.5 (*)     Lymphocytes Absolute 0.6 (*)     All other components within normal limits   BASIC METABOLIC PANEL W/ REFLEX TO MG FOR LOW K - Abnormal; Notable for the following components:    Sodium 125 (*)     Potassium reflex Magnesium  Status: Regular  Pertinent or High Risk Medications/Drips: no   If Yes, please provide details:   Pending Blood Product Administration: no       You may also review the ED PT Care Timeline found under the Summary Nursing Index tab.    Recommendation    Pending orders:   Plan for Discharge (if known):   Additional Comments: pt coming from home for fall causing R ankle fracture. Pt also has hx of alcohol abuse and is showing signs of withdrawals with tremors in hands and nausea. Takes ativan at home but still has CIWA of 20. Using purewick. Non ambulatory but given crutches in ED. Refused to put on boot without pain meds  If any further questions, please call Sending RN at [phone]    Electronically signed by: Electronically signed by Michelle Orourke RN on 6/13/2024 at 2:17 PM      Michelle Orourke RN  06/13/24 [id number]

## 2024-06-13 NOTE — PROGRESS NOTES
4 Eyes Skin Assessment     NAME:  Jennifer Ulrich  YOB: 1960  MEDICAL RECORD NUMBER:  [id number]    The patient is being assessed for  Admission    I agree that at least one RN has performed a thorough Head to Toe Skin Assessment on the patient. ALL assessment sites listed below have been assessed.      Areas assessed by both nurses:    Head, Face, Ears, Shoulders, Back, Chest, Arms, Elbows, Hands, Sacrum. Buttock, Coccyx, Ischium, Legs. Feet and Heels, and Under Medical Devices         Does the Patient have a Wound? No noted wound(s)       Germán Prevention initiated by RN: No  Wound Care Orders initiated by RN: No    Pressure Injury (Stage 3,4, Unstageable, DTI, NWPT, and Complex wounds) if present, place Wound referral order by RN under : No    New Ostomies, if present place, Ostomy referral order under : No     Nurse 1 eSignature: Electronically signed by Miguel Erwin RN on 6/13/24 at 6:30 PM EDT    **SHARE this note so that the co-signing nurse can place an eSignature**    Nurse 2 eSignature: Electronically signed by BALDEMAR DOWELL LPN on 6/13/24 at 7:00 PM EDT

## 2024-06-13 NOTE — PROGRESS NOTES
Pt. Arrived to unit and was complaining of 9/10 right ankle pain. Pt. Is scoring on the CIWA scale and has had ativan along with pain medication. 2 RN's have completed 4 eyes and put a purwick on the pt. Pt. Is asking for more pain medication and MD has been notified. Pt. Has been started on NS infusion and connected to telemetry. Pt. Will continue to be monitored.

## 2024-06-13 NOTE — ED PROVIDER NOTES
ED Attending Attestation Note     Date of evaluation: 6/13/2024    This patient was seen by the advance practice provider.  I have seen and examined the patient, agree with the workup, evaluation, management and diagnosis. The care plan has been discussed.  I have reviewed the ECG and concur with the DELLA's interpretation.  My assessment reveals a 64-year-old female with alcohol use disorder who presents for evaluation of right ankle pain after a fall that occurred yesterday.  Patient states she tripped while going down the stairs and complains of lateral ankle pain.  She is a patient of Dr. Noriega and has had issues with recurrent hyponatremia secondary to alcohol use and he expressed concerns that she may be hyponatremic today.  On my exam, patient was mildly tachycardic on arrival but otherwise normal vital signs.  She does have an elevated CIWA of 11.  Her right ankle shows no acute deformity and she has a 2+ DP pulse.  She is tender primarily at the lateral aspect of the joint and lateral malleolus.  She able to wiggle all toes.     Gray Pedraza MD  06/13/24 [id number]

## 2024-06-14 LAB
ANION GAP SERPL CALCULATED.3IONS-SCNC: 8 MMOL/L (ref 3–16)
BASOPHILS # BLD: 0 K/UL (ref 0–0.2)
BASOPHILS NFR BLD: 0.5 %
BUN SERPL-MCNC: 5 MG/DL (ref 7–20)
CALCIUM SERPL-MCNC: 10.2 MG/DL (ref 8.3–10.6)
CHLORIDE SERPL-SCNC: 93 MMOL/L (ref 99–110)
CO2 SERPL-SCNC: 25 MMOL/L (ref 21–32)
CREAT SERPL-MCNC: <0.5 MG/DL (ref 0.6–1.2)
DEPRECATED RDW RBC AUTO: 16.7 % (ref 12.4–15.4)
EOSINOPHIL # BLD: 0.1 K/UL (ref 0–0.6)
EOSINOPHIL NFR BLD: 1 %
GFR SERPLBLD CREATININE-BSD FMLA CKD-EPI: >90 ML/MIN/{1.73_M2}
GLUCOSE SERPL-MCNC: 101 MG/DL (ref 70–99)
HCT VFR BLD AUTO: 35.4 % (ref 36–48)
HGB BLD-MCNC: 11.9 G/DL (ref 12–16)
LYMPHOCYTES # BLD: 1.8 K/UL (ref 1–5.1)
LYMPHOCYTES NFR BLD: 24.6 %
MCH RBC QN AUTO: 29.5 PG (ref 26–34)
MCHC RBC AUTO-ENTMCNC: 33.5 G/DL (ref 31–36)
MCV RBC AUTO: 88.2 FL (ref 80–100)
MONOCYTES # BLD: 0.5 K/UL (ref 0–1.3)
MONOCYTES NFR BLD: 7.2 %
NEUTROPHILS # BLD: 4.9 K/UL (ref 1.7–7.7)
NEUTROPHILS NFR BLD: 66.7 %
OSMOLALITY UR: 266 MOSM/KG (ref 390–1070)
PLATELET # BLD AUTO: 155 K/UL (ref 135–450)
PMV BLD AUTO: 8.1 FL (ref 5–10.5)
POTASSIUM SERPL-SCNC: 5.4 MMOL/L (ref 3.5–5.1)
RBC # BLD AUTO: 4.01 M/UL (ref 4–5.2)
SODIUM SERPL-SCNC: 126 MMOL/L (ref 136–145)
SODIUM UR-SCNC: 90 MMOL/L
WBC # BLD AUTO: 7.4 K/UL (ref 4–11)

## 2024-06-14 PROCEDURE — 80048 BASIC METABOLIC PNL TOTAL CA: CPT

## 2024-06-14 PROCEDURE — 99233 SBSQ HOSP IP/OBS HIGH 50: CPT | Performed by: INTERNAL MEDICINE

## 2024-06-14 PROCEDURE — 97166 OT EVAL MOD COMPLEX 45 MIN: CPT

## 2024-06-14 PROCEDURE — 97162 PT EVAL MOD COMPLEX 30 MIN: CPT

## 2024-06-14 PROCEDURE — 6370000000 HC RX 637 (ALT 250 FOR IP): Performed by: INTERNAL MEDICINE

## 2024-06-14 PROCEDURE — 97530 THERAPEUTIC ACTIVITIES: CPT

## 2024-06-14 PROCEDURE — 2580000003 HC RX 258: Performed by: INTERNAL MEDICINE

## 2024-06-14 PROCEDURE — 6360000002 HC RX W HCPCS: Performed by: NURSE PRACTITIONER

## 2024-06-14 PROCEDURE — 85025 COMPLETE CBC W/AUTO DIFF WBC: CPT

## 2024-06-14 PROCEDURE — 97535 SELF CARE MNGMENT TRAINING: CPT

## 2024-06-14 PROCEDURE — 1200000000 HC SEMI PRIVATE

## 2024-06-14 PROCEDURE — 36415 COLL VENOUS BLD VENIPUNCTURE: CPT

## 2024-06-14 PROCEDURE — 97116 GAIT TRAINING THERAPY: CPT

## 2024-06-14 RX ORDER — SENNA AND DOCUSATE SODIUM 50; 8.6 MG/1; MG/1
2 TABLET, FILM COATED ORAL DAILY PRN
Status: DISCONTINUED | OUTPATIENT
Start: 2024-06-14 | End: 2024-06-15 | Stop reason: HOSPADM

## 2024-06-14 RX ORDER — FLUORIDE TOOTHPASTE
15 TOOTHPASTE DENTAL 3 TIMES DAILY
Status: DISCONTINUED | OUTPATIENT
Start: 2024-06-14 | End: 2024-06-15 | Stop reason: HOSPADM

## 2024-06-14 RX ADMIN — LORAZEPAM 2 MG: 1 TABLET ORAL at 01:06

## 2024-06-14 RX ADMIN — OXYCODONE HYDROCHLORIDE 10 MG: 5 TABLET ORAL at 21:50

## 2024-06-14 RX ADMIN — HYDROMORPHONE HYDROCHLORIDE 0.5 MG: 1 INJECTION, SOLUTION INTRAMUSCULAR; INTRAVENOUS; SUBCUTANEOUS at 23:59

## 2024-06-14 RX ADMIN — Medication 5000 UNITS: at 11:02

## 2024-06-14 RX ADMIN — Medication 100 MG: at 11:02

## 2024-06-14 RX ADMIN — SODIUM CHLORIDE, PRESERVATIVE FREE 10 ML: 5 INJECTION INTRAVENOUS at 11:02

## 2024-06-14 RX ADMIN — HYDROMORPHONE HYDROCHLORIDE 0.5 MG: 1 INJECTION, SOLUTION INTRAMUSCULAR; INTRAVENOUS; SUBCUTANEOUS at 06:28

## 2024-06-14 RX ADMIN — Medication 100 MG: at 20:33

## 2024-06-14 RX ADMIN — LORAZEPAM 2 MG: 1 TABLET ORAL at 20:33

## 2024-06-14 RX ADMIN — Medication 30 G: at 11:01

## 2024-06-14 RX ADMIN — THERA TABS 1 TABLET: TAB at 11:02

## 2024-06-14 RX ADMIN — Medication 30 G: at 06:29

## 2024-06-14 RX ADMIN — LORAZEPAM 2 MG: 1 TABLET ORAL at 17:29

## 2024-06-14 RX ADMIN — FOLIC ACID TAB 400 MCG 700 MCG: 400 TAB at 11:02

## 2024-06-14 RX ADMIN — DOCUSATE SODIUM 50 MG AND SENNOSIDES 8.6 MG 2 TABLET: 8.6; 5 TABLET, FILM COATED ORAL at 20:43

## 2024-06-14 RX ADMIN — Medication 15 ML: at 11:07

## 2024-06-14 RX ADMIN — Medication 100 MG: at 11:01

## 2024-06-14 RX ADMIN — LORAZEPAM 3 MG: 1 TABLET ORAL at 11:02

## 2024-06-14 RX ADMIN — PANTOPRAZOLE SODIUM 40 MG: 40 TABLET, DELAYED RELEASE ORAL at 06:23

## 2024-06-14 RX ADMIN — ZOLPIDEM TARTRATE 5 MG: 5 TABLET ORAL at 20:33

## 2024-06-14 RX ADMIN — SODIUM CHLORIDE, PRESERVATIVE FREE 10 ML: 5 INJECTION INTRAVENOUS at 20:37

## 2024-06-14 ASSESSMENT — PAIN DESCRIPTION - ORIENTATION
ORIENTATION: RIGHT

## 2024-06-14 ASSESSMENT — PAIN - FUNCTIONAL ASSESSMENT
PAIN_FUNCTIONAL_ASSESSMENT: ACTIVITIES ARE NOT PREVENTED

## 2024-06-14 ASSESSMENT — PAIN DESCRIPTION - DESCRIPTORS
DESCRIPTORS: ACHING;THROBBING
DESCRIPTORS: THROBBING;ACHING
DESCRIPTORS: ACHING;THROBBING
DESCRIPTORS: ACHING

## 2024-06-14 ASSESSMENT — PAIN DESCRIPTION - ONSET
ONSET: ON-GOING

## 2024-06-14 ASSESSMENT — PAIN DESCRIPTION - PAIN TYPE
TYPE: ACUTE PAIN

## 2024-06-14 ASSESSMENT — PAIN SCALES - GENERAL
PAINLEVEL_OUTOF10: 5
PAINLEVEL_OUTOF10: 0
PAINLEVEL_OUTOF10: 6
PAINLEVEL_OUTOF10: 8
PAINLEVEL_OUTOF10: 7
PAINLEVEL_OUTOF10: 8
PAINLEVEL_OUTOF10: 7

## 2024-06-14 ASSESSMENT — PAIN DESCRIPTION - LOCATION
LOCATION: ANKLE

## 2024-06-14 ASSESSMENT — PAIN DESCRIPTION - FREQUENCY
FREQUENCY: CONTINUOUS

## 2024-06-14 NOTE — PROGRESS NOTES
Na stable not better   Ur studies shows high fluids intake and poor solute intake and inc ADH     Stop saline as sod not better with saline  Bp elevated     Need free water restriction   Inc solute intake

## 2024-06-14 NOTE — PROGRESS NOTES
06/14/24 1609   Encounter Summary   Encounter Overview/Reason Initial Encounter   Service Provided For Patient   Support System Spouse   Last Encounter  06/14/24  (SC)   Complexity of Encounter Moderate   Spiritual/Emotional needs   Type Emotional Distress;Spiritual Distress   Grief, Loss, and Adjustments   Type Life Adjustments   Assessment/Intervention/Outcome   Assessment Compromised coping;Concerns with suffering;Interrupted family processes;Tearful  (pt expressed concern over all her suffering; expressed frustrating family relationships)   Intervention Active listening;Explored/Affirmed feelings, thoughts, concerns;Explored Coping Skills/Resources;Sustaining Presence/Ministry of presence   Outcome Receptive;Expressed Gratitude;Expressed feelings, needs, and concerns;Engaged in conversation     Student charis Marte

## 2024-06-14 NOTE — PROGRESS NOTES
Occupational Therapy  Facility/Department: [address]  Occupational Therapy Initial Assessment and Treatment      Name: Jennifer Ulrich  : 1960  MRN: [id number]  Date of Service: 2024    Discharge Recommendations:  24 hour supervision or assist, Home with Home health OT  OT Equipment Recommendations  Equipment Needed: Yes  Mobility Devices: ADL Assistive Devices  ADL Assistive Devices: Toileting - Raised Toilet Seat with arms;Shower Chair with back    HOME HEALTH CARE: LEVEL 1 STANDARD    - Initial home health evaluation to occur within 24-48 hours, in patient home   - Therapy to evaluate with goal of regaining prior level of functioning   - Therapy to evaluate if patient has Home Health Aide needs for personal care         Patient Diagnosis(es): The primary encounter diagnosis was Hyponatremia. Diagnoses of Alcohol abuse and Closed nondisplaced avulsion fracture of right talus, initial encounter were also pertinent to this visit.      Treatment Diagnosis: impaired ADLs/transfers s/p fall w/ resultant dorsal talus avusion fx (WBAT in CAM boot)      Assessment   Performance deficits / Impairments: Decreased functional mobility ;Decreased ADL status;Decreased balance  Assessment: Presenting s/p fall while intoxicated - (+) dorsal talus avulsion fracture. Orthopedic consult - recommend WBAT in CAM boot. Today, pt functioning at CGA level for functional mobility using RW and CGA for ADLs. Pt did require assist/education/demonstration how to don/doff CAM boot - pt/wife verbalizing understanding. Recommend initial 24 hr ASSIST and HHOT. Discussed recommendation for RTS w/ armrests and shower chair - discussed where these items can be purchased. Will continue to follow during admission. Continue per POC.  Treatment Diagnosis: impaired ADLs/transfers s/p fall w/ resultant dorsal talus avusion fx (WBAT in CAM boot)  Prognosis: Good  Decision Making: Medium Complexity  REQUIRES OT FOLLOW-UP:  guard assistance    Vision  Vision: Impaired  Vision Exceptions: Wears glasses for distance;Wears glasses for reading  Hearing  Hearing: Within functional limits    Cognition  Overall Cognitive Status: WFL  Cognition Comment: anxious, repetition with cues  Orientation  Overall Orientation Status: Within Functional Limits  Orientation Level: Oriented X4                    Education Given To: Patient;Family  Education Provided: Role of Therapy;Plan of Care;Precautions;ADL Adaptive Strategies;Transfer Training;Fall Prevention Strategies  Education Provided Comments: how to don/doff CAM boot  Education Method: Verbal;Demonstration  Education Outcome: Verbalized understanding;Continued education needed                      Pt seen by OT for eval and treat. Treatment included: bed mobility, functional transfer/mobility, ADL, pt education                                                    AM-PAC - ADL  AM-PAC Daily Activity - Inpatient   How much help is needed for putting on and taking off regular lower body clothing?: A Little  How much help is needed for bathing (which includes washing, rinsing, drying)?: A Little  How much help is needed for toileting (which includes using toilet, bedpan, or urinal)?: A Little  How much help is needed for putting on and taking off regular upper body clothing?: A Little  How much help is needed for taking care of personal grooming?: A Little  How much help for eating meals?: A Little  AM-PAC Inpatient Daily Activity Raw Score: 18  AM-PAC Inpatient ADL T-Scale Score : 38.66  ADL Inpatient CMS 0-100% Score: 46.65  ADL Inpatient CMS G-Code Modifier : CK      Goals  Short Term Goals  Time Frame for Short Term Goals: Discharge  Short Term Goal 1: toilet transfer w/ Supervision  Short Term Goal 2: LB dressing w/ Supervision - including CAM boot  Short Term Goal 3: grooming tasks at sink level w/ Supervision  Patient Goals   Patient goals : to be independent       Therapy Time   Individual

## 2024-06-14 NOTE — PROGRESS NOTES
Nephrology Consult Note                                                                                                                                                                                                                                                                                                       Office: [phone]       Fax:  [phone]      Patient's Name: Jennifer Ulrich  9:51 AM  6/14/2024    Reason for Consult:  hyponatremia  Requesting Physician:  Lilia Grubbs MD      Chief Complaint:  right foot hurts     Subjective     HPI:    Jennifer Ulrich is a 64 y.o. female with hx alcohol use disorder and hyponatremia. Presented to ED with right ankle plain which occurred when her right leg gave out causing ankle to invert. Denies LOC or head trauma. Denies N/V, but reports feeling shaking and weak which she attributes to low sodium as she has had this problem in the past due to alcohol use.. She has been drinking 5-7 shots bourbon daily, last drink this AM 0400. She reports she has previously taken a break from alcohol for year before, and wants to quit alcohol permanently after this. She also reports reduced food intake over past week as well. Denies diarrhea, denies starting any new medications.     Tachycardic but HDS on arrival. CIWA elev 11. Right ankle avulsion vs impaction fx of talus      06/14/24   Na stable on IVF   Not on fluid restriction       Past Medical History:   Diagnosis Date    C. difficile diarrhea 05/15/2021    CAD (coronary artery disease)     Cervical dystonia     CHF (congestive heart failure) (HCC)     Dental crown present     upper right and bonding    Fatty liver     Hyperlipidemia     Hypertension     Lichen sclerosus     PONV (postoperative nausea and vomiting)        Past Surgical History:   Procedure Laterality Date    BREAST SURGERY      COLONOSCOPY      COLONOSCOPY N/A 7/2/2019    COLONOSCOPY WITH BIOPSY performed by Jayden Lund MD at [facility]

## 2024-06-14 NOTE — DISCHARGE INSTR - COC
Continuity of Care Form    Patient Name: Jennifer Ulrich   :  1960  MRN:  [id number]    Admit date:  2024  Discharge date:  ***    Code Status Order: Full Code   Advance Directives:     Admitting Physician:  Manjit Gale MD  PCP: Lilia Grubbs MD    Discharging Nurse: ***  Discharging Hospital Unit/Room#: 6313/6313-01  Discharging Unit Phone Number: ***    Emergency Contact:   Extended Emergency Contact Information  Primary Emergency Contact: Paul Ulrich  Address: [address] [place], [place] [address] [facility]  Home Phone: [phone]  Work Phone: [phone]  Mobile Phone: [phone]  Relation: Spouse    Past Surgical History:  Past Surgical History:   Procedure Laterality Date    BREAST SURGERY      COLONOSCOPY      COLONOSCOPY N/A 2019    COLONOSCOPY WITH BIOPSY performed by Jayden Lund MD at [facility] ENDOSCOPY    COLONOSCOPY  2019    COLONOSCOPY POLYPECTOMY SNARE/COLD BIOPSY performed by Jayden uLnd MD at [facility] ENDOSCOPY    COLONOSCOPY N/A 2023    COLONOSCOPY POLYPECTOMY SNARE/COLD BIOPSY performed by Jayden Lund MD at [facility] ENDOSCOPY    ENDOMETRIAL ABLATION      ENDOSCOPY, COLON, DIAGNOSTIC      UPPER GASTROINTESTINAL ENDOSCOPY N/A 2023    EGD BIOPSY gastric polyp bx GE junction bx eval  ring performed by Jayden Lund MD at [facility] ENDOSCOPY    UPPER GASTROINTESTINAL ENDOSCOPY N/A 2023    EGD DILATION BALLOON 18,19,20mm balloon dilation performed by Jayden Lund MD at [facility] ENDOSCOPY       Immunization History:   Immunization History   Administered Date(s) Administered    COVID-19, MODERNA BLUE border, Primary or Immunocompromised, (age 12y+), IM, 100 mcg/0.5mL 2021, 2021    COVID-19, PFIZER Bivalent, DO NOT Dilute, (age 12y+), IM, 30 mcg/0.3 mL 2023    Influenza, FLUARIX, FLULAVAL, FLUZONE (age 6 mo+) AND AFLURIA, (age 3 y+), PF, 0.5mL 2022       Active Problems:  Patient Active

## 2024-06-14 NOTE — PROGRESS NOTES
Physical Therapy  Facility/Department: [address]  Physical Therapy Initial Assessment    Name: Jennifer Ulrich  : 1960  MRN: [id number]  Date of Service: 2024    Discharge Recommendations:  24 hour supervision or assist, Home with Home health PT   PT Equipment Recommendations  Equipment Needed: Yes  Mobility Devices: Walker  Walker: Rolling      Patient Diagnosis(es): The primary encounter diagnosis was Hyponatremia. Diagnoses of Alcohol abuse and Closed nondisplaced avulsion fracture of right talus, initial encounter were also pertinent to this visit.  Past Medical History:  has a past medical history of C. difficile diarrhea, CAD (coronary artery disease), Cervical dystonia, CHF (congestive heart failure) (HCC), Dental crown present, Fatty liver, Hyperlipidemia, Hypertension, Lichen sclerosus, and PONV (postoperative nausea and vomiting).  Past Surgical History:  has a past surgical history that includes Breast surgery; Endoscopy, colon, diagnostic; Colonoscopy; Endometrial ablation; Colonoscopy (N/A, 2019); Colonoscopy (2019); Upper gastrointestinal endoscopy (N/A, 2023); Colonoscopy (N/A, 2023); and Upper gastrointestinal endoscopy (N/A, 2023).    Assessment   Body Structures, Functions, Activity Limitations Requiring Skilled Therapeutic Intervention: Decreased functional mobility ;Decreased safe awareness;Decreased endurance;Decreased strength;Decreased balance  Assessment: Pt is a 63 yo female from home with  and IND at baseline admitted after fall and R ankle talus dorsal avulsion fx/sprain. Pt has hx of alcohol dependence and presents with resting tremors and inc anxiety on eval requiring encouragement and reassurement throughout. pt requiring CGA for transfers and short distance ambulation at  with CAM boot donned. Pt and  educated on compensatory strategies to use at dc and equipment recommendations. Pt will benefit from continued acute PT and                  AM-PAC - Mobility    AM-PAC Basic Mobility - Inpatient   How much help is needed turning from your back to your side while in a flat bed without using bedrails?: A Little  How much help is needed moving from lying on your back to sitting on the side of a flat bed without using bedrails?: A Little  How much help is needed moving to and from a bed to a chair?: A Little  How much help is needed standing up from a chair using your arms?: A Little  How much help is needed walking in hospital room?: A Little  How much help is needed climbing 3-5 steps with a railing?: A Little  [facility] Score : 18  AM-PAC Inpatient T-Scale Score : 43.63  Mobility Inpatient CMS 0-100% Score: 46.58  Mobility Inpatient CMS G-Code Modifier : CK         Tinneti Score       Goals  Short Term Goals  Time Frame for Short Term Goals: by dc  Short Term Goal 1: pt will perform bed mobility with mod I  Short Term Goal 2: pt will perform functional transfers with LRAD and mod I  Short Term Goal 3: pt will ambulate 150' with LRAD and mod I  Short Term Goal 4: pt will negotiate a flight of steps with LRAD and mod I  Patient Goals   Patient Goals : to go home       Education  Patient Education  Education Given To: Patient;Family  Education Provided: Role of Therapy;Plan of Care;Precautions;Transfer Training;Fall Prevention Strategies  Education Method: Demonstration;Verbal  Barriers to Learning: None  Education Outcome: Verbalized understanding;Continued education needed      Therapy Time   Individual Concurrent Group Co-treatment   Time In 0825         Time Out 0948         Minutes 83              Timed Code Treatment Minutes:  68 min    Total Treatment Minutes:  83 min      Joan Wheeler, PT

## 2024-06-14 NOTE — PROGRESS NOTES
V2.0    [facility] Progress Note      Name:  Jennifer Ulrich /Age/Sex: 1960  (64 y.o. female)   MRN & CSN:  [id number] & [id number] Encounter Date/Time: 2024 12:17 PM EDT   Location:  63/6313-01 PCP: Lilia Grubbs MD     Attending:Manjit Gale MD       Hospital Day: 2    Assessment and Recommendations   Mechanical fall, no reports of LOC leading to right ankle fracture suspect patient was intoxicated at the time of fall  Right ankle avulsion/impaction fracture of the dorsal aspect of the talus  RICE treatment  Orthopedic consult, recommended Boot when out of bed, weightbearing as tolerated, already worked with physical therapy today and recommended home with home health care with  supervision and rolling walker   working on DME    Alcohol dependence with withdrawal  CIWA protocol  Multivitamin, thiamine  If high CIWA scores add scheduled Librium    Hyponatremia acute on chronic, low serum osm  Sodium 125 admission  Discussed with Dr. Rachel  Urine studies reviewed and suspected poor solute intake and increase ADH leading to hyponatremia  We will stop normal saline  Do free water restriction  Monitor sodium closely     Cervical dystonia, continue home Ambien/Ativan  GERD, continue home PPI     Hypertension/hypothyroidism, continue home medications    Diet ADULT DIET; Regular; 1200 ml   DVT Prophylaxis [x] Lovenox, []  Heparin, [] SCDs, [] Ambulation,  [] Eliquis, [] Xarelto  [] Coumadin   Code Status Full Code   Disposition From: Home  Expected Disposition: Home with home health care  Estimated Date of Discharge:   Patient requires continued admission due to monitoring of hyponatremia   Surrogate Decision Maker/ POA Spouse     Personally reviewed Lab Studies and Imaging   Discussed management of the case with nephrology, case management      Subjective:     Chief Complaint:   Chief Complaint   Patient presents with    Ankle Pain     Right ankle pain, pt reports she was    Component Value Date/Time    BC No Growth after 4 days of incubation. 11/26/2022 07:44 PM     Lab Results   Component Value Date/Time    BLOODCULT2 No Growth after 4 days of incubation. 11/26/2022 07:33 PM     Organism:   Lab Results   Component Value Date/Time    ORG C. difficile toxin B gene detected 05/15/2021 08:16 PM         Electronically signed by Manjit Gale MD on 6/14/2024 at 12:17 PM

## 2024-06-14 NOTE — DISCHARGE INSTRUCTIONS
Alcohol and Substance Abuse Community Resources:    Information and Referrals:  [facility] ([facility]) 24 hours/ 7days - [phone]  [facility] [place] (24/7 hotline)- 389.438.2576([place]); 823.538.8771 ([place])  www.addictionservicescouncil.org;  [address], [place] [address]  ERASMO Armando II (Treatment consultant) - [phone]   (www.[employer] or email: lars@[employer]fit - F[employer] for the rest of us  ERASMO Lee () - [phone]    Self Help Resources:  Alcoholics Anonymous Hotline (www.Atooma)  (24 hours/ 7days) - 216.956.5245    [address] [facility] [address](enter on [facility]) [place], [place] [address]  [facility] [place] (www.[facility].org)- for [place] - 6-716-111-9521  Al-ANON . [place]/ [place]. [place] AL-ANON Information Services - 3-153-914-3410  (www.Honestly.com.org)   Narcotics Anonymous (www.[employer])  - 743.999.7224  [facility] (www.smartrecovery.org) - 514.291.7344    Suicide Hotlines: toll free and available 24/7  298-757-CARE (2273)  4-909-NAXDICU (1-562.660.3163)  1-145-352-TALK (4-253-648-TALK) - [facility] Crisis Line  TTY: 2-326-593-4TTY    Detoxification Services/ Inpatient Treatment/ Residential Treatment Programs:  [facility] - [phone] [address], [place] [address]  [facility] ([facility]) - [phone]  [address] [place], [place] [address]  [facility] - [phone]  [address], [place] [address]  [facility] -1-833-953-5362  [address] [place] [place], [place] [address] [place] [address] ([facility]) - [phone] ext. [phone]  [address] [place] [address] [facility] - 9-427-430-7489 or 4-258-883-CARE  [address] [place] [address] [address] [place] [address] [facility] [place] (www.[facility].agri.capital) - [phone]  [address]  [facility] ([facility]) - [phone]  [address] [place], [place] [address]  [facility] ([facility]) - [phone]  [address] [place], [place] [address]  [facility] - [phone]   [address] [facility] [address] [place], [place] [address]  [facility] (www.[employer])  - [phone]  [address] [facility] [address] [place], [place] [address]  [facility] - [phone]  [address] [place], [place] [address]  [facility] - [phone]  [address] [facility] [address] [place], [place] [address]  [facility] - [phone]   [address] [place], [place] [address]  [facility] - [phone]   [address] [place], [place] [address]  [facility] - [phone]   [address] [facility] [address] [place], [place] [address]  [place] [facility] - [phone]   [address] [place], [place] [address]  [facility] Substance Abuse - [phone]  [address] [place], [place] [address]  [employer] - [phone]  [address] [address]    Methadone/ Suboxone Clinics  [facility] - [phone]   [address] [place], [place] [address]  [facility] - [phone]   [address] [place], [place] [address]  [facility] - [phone]   [address] [place], [place] [address] (use lower level entrance)  [facility] - [phone]   [address] [place], [place] [address]  [facility] - [phone]   [address] [place], [place] [address]  [facility] (www.[employer])  - [phone]  [address] [place], [place] [address] [place] [address] - [phone]   [address] [place] [address]

## 2024-06-14 NOTE — PLAN OF CARE
Patient verbalizes adequate pain control with PRN medications. No new skin issues noted. Patient remains free from falls and injury.  Problem: Pain  Goal: Verbalizes/displays adequate comfort level or baseline comfort level  Outcome: Progressing     Problem: Skin/Tissue Integrity  Goal: Absence of new skin breakdown  Description: 1.  Monitor for areas of redness and/or skin breakdown  2.  Assess vascular access sites hourly  3.  Every 4-6 hours minimum:  Change oxygen saturation probe site  4.  Every 4-6 hours:  If on nasal continuous positive airway pressure, respiratory therapy assess nares and determine need for appliance change or resting period.  Outcome: Progressing     Problem: Safety - Adult  Goal: Free from fall injury  Outcome: Progressing

## 2024-06-15 VITALS
DIASTOLIC BLOOD PRESSURE: 79 MMHG | RESPIRATION RATE: 18 BRPM | SYSTOLIC BLOOD PRESSURE: 126 MMHG | OXYGEN SATURATION: 95 % | HEART RATE: 96 BPM | BODY MASS INDEX: 27.33 KG/M2 | TEMPERATURE: 99.6 F | WEIGHT: 190.9 LBS | HEIGHT: 70 IN

## 2024-06-15 PROBLEM — S92.154A CLOSED NONDISPLACED AVULSION FRACTURE OF RIGHT TALUS: Status: ACTIVE | Noted: 2024-06-15

## 2024-06-15 LAB
ALBUMIN SERPL-MCNC: 3.8 G/DL (ref 3.4–5)
ANION GAP SERPL CALCULATED.3IONS-SCNC: 9 MMOL/L (ref 3–16)
BUN SERPL-MCNC: 13 MG/DL (ref 7–20)
CALCIUM SERPL-MCNC: 9.8 MG/DL (ref 8.3–10.6)
CHLORIDE SERPL-SCNC: 96 MMOL/L (ref 99–110)
CO2 SERPL-SCNC: 27 MMOL/L (ref 21–32)
CREAT SERPL-MCNC: <0.5 MG/DL (ref 0.6–1.2)
GFR SERPLBLD CREATININE-BSD FMLA CKD-EPI: >90 ML/MIN/{1.73_M2}
GLUCOSE SERPL-MCNC: 110 MG/DL (ref 70–99)
PHOSPHATE SERPL-MCNC: 3.1 MG/DL (ref 2.5–4.9)
POTASSIUM SERPL-SCNC: 4.1 MMOL/L (ref 3.5–5.1)
SODIUM SERPL-SCNC: 132 MMOL/L (ref 136–145)

## 2024-06-15 PROCEDURE — 99233 SBSQ HOSP IP/OBS HIGH 50: CPT | Performed by: INTERNAL MEDICINE

## 2024-06-15 PROCEDURE — 6360000002 HC RX W HCPCS: Performed by: INTERNAL MEDICINE

## 2024-06-15 PROCEDURE — 6370000000 HC RX 637 (ALT 250 FOR IP): Performed by: INTERNAL MEDICINE

## 2024-06-15 PROCEDURE — 2580000003 HC RX 258: Performed by: INTERNAL MEDICINE

## 2024-06-15 PROCEDURE — 36415 COLL VENOUS BLD VENIPUNCTURE: CPT

## 2024-06-15 PROCEDURE — 80069 RENAL FUNCTION PANEL: CPT

## 2024-06-15 RX ORDER — LORAZEPAM 1 MG/1
1 TABLET ORAL EVERY 8 HOURS PRN
Qty: 15 TABLET | Refills: 0 | Status: SHIPPED | OUTPATIENT
Start: 2024-06-15 | End: 2024-06-20

## 2024-06-15 RX ORDER — OXYCODONE HYDROCHLORIDE 5 MG/1
5 TABLET ORAL EVERY 8 HOURS PRN
Qty: 15 TABLET | Refills: 0 | Status: SHIPPED | OUTPATIENT
Start: 2024-06-15 | End: 2024-06-20

## 2024-06-15 RX ADMIN — FOLIC ACID TAB 400 MCG 700 MCG: 400 TAB at 09:20

## 2024-06-15 RX ADMIN — LORAZEPAM 2 MG: 2 INJECTION INTRAMUSCULAR; INTRAVENOUS at 09:03

## 2024-06-15 RX ADMIN — Medication 5000 UNITS: at 09:02

## 2024-06-15 RX ADMIN — SODIUM CHLORIDE, PRESERVATIVE FREE 10 ML: 5 INJECTION INTRAVENOUS at 09:03

## 2024-06-15 RX ADMIN — THERA TABS 1 TABLET: TAB at 09:02

## 2024-06-15 RX ADMIN — PANTOPRAZOLE SODIUM 40 MG: 40 TABLET, DELAYED RELEASE ORAL at 05:49

## 2024-06-15 RX ADMIN — OXYCODONE HYDROCHLORIDE 10 MG: 5 TABLET ORAL at 02:18

## 2024-06-15 RX ADMIN — Medication 15 ML: at 02:16

## 2024-06-15 RX ADMIN — Medication 100 MG: at 09:02

## 2024-06-15 RX ADMIN — Medication 15 ML: at 09:06

## 2024-06-15 RX ADMIN — Medication 30 G: at 09:13

## 2024-06-15 RX ADMIN — LORAZEPAM 1 MG: 2 INJECTION INTRAMUSCULAR; INTRAVENOUS at 11:44

## 2024-06-15 RX ADMIN — ENOXAPARIN SODIUM 40 MG: 100 INJECTION SUBCUTANEOUS at 09:01

## 2024-06-15 RX ADMIN — SODIUM CHLORIDE, PRESERVATIVE FREE 10 ML: 5 INJECTION INTRAVENOUS at 09:14

## 2024-06-15 ASSESSMENT — PAIN DESCRIPTION - FREQUENCY: FREQUENCY: CONTINUOUS

## 2024-06-15 ASSESSMENT — PAIN SCALES - GENERAL
PAINLEVEL_OUTOF10: 0
PAINLEVEL_OUTOF10: 7

## 2024-06-15 ASSESSMENT — PAIN DESCRIPTION - ONSET: ONSET: ON-GOING

## 2024-06-15 ASSESSMENT — PAIN DESCRIPTION - PAIN TYPE: TYPE: ACUTE PAIN

## 2024-06-15 ASSESSMENT — PAIN DESCRIPTION - ORIENTATION: ORIENTATION: RIGHT

## 2024-06-15 ASSESSMENT — PAIN DESCRIPTION - DESCRIPTORS: DESCRIPTORS: ACHING;THROBBING

## 2024-06-15 ASSESSMENT — PAIN DESCRIPTION - LOCATION: LOCATION: ANKLE

## 2024-06-15 ASSESSMENT — PAIN - FUNCTIONAL ASSESSMENT: PAIN_FUNCTIONAL_ASSESSMENT: ACTIVITIES ARE NOT PREVENTED

## 2024-06-15 NOTE — PLAN OF CARE
Problem: Discharge Planning  Goal: Discharge to home or other facility with appropriate resources  6/15/2024 1309 by Rosalie Loo RN  Outcome: Adequate for Discharge  6/15/2024 0057 by Tanisha Valentin RN  Outcome: Progressing     Problem: Pain  Goal: Verbalizes/displays adequate comfort level or baseline comfort level  6/15/2024 1309 by Rosalie Loo RN  Outcome: Adequate for Discharge  6/15/2024 0057 by Tanisha Valentin RN  Outcome: Progressing     Problem: Skin/Tissue Integrity  Goal: Absence of new skin breakdown  Description: 1.  Monitor for areas of redness and/or skin breakdown  2.  Assess vascular access sites hourly  3.  Every 4-6 hours minimum:  Change oxygen saturation probe site  4.  Every 4-6 hours:  If on nasal continuous positive airway pressure, respiratory therapy assess nares and determine need for appliance change or resting period.  6/15/2024 1309 by Rosalie Loo RN  Outcome: Adequate for Discharge  6/15/2024 0057 by Tanisha Valentin RN  Outcome: Progressing     Problem: Chronic Conditions and Co-morbidities  Goal: Patient's chronic conditions and co-morbidity symptoms are monitored and maintained or improved  Outcome: Adequate for Discharge     Problem: Safety - Adult  Goal: Free from fall injury  6/15/2024 1309 by Rosalie Loo RN  Outcome: Adequate for Discharge  6/15/2024 0057 by Tanisha Valentin RN  Outcome: Progressing

## 2024-06-15 NOTE — PLAN OF CARE
Anticipates to discharge home with  when order written. Pain controlled with PRN pain medication. No new skin issues noted. Patient remains free from falls and injury.  Problem: Discharge Planning  Goal: Discharge to home or other facility with appropriate resources  Outcome: Progressing     Problem: Pain  Goal: Verbalizes/displays adequate comfort level or baseline comfort level  Outcome: Progressing     Problem: Skin/Tissue Integrity  Goal: Absence of new skin breakdown  Description: 1.  Monitor for areas of redness and/or skin breakdown  2.  Assess vascular access sites hourly  3.  Every 4-6 hours minimum:  Change oxygen saturation probe site  4.  Every 4-6 hours:  If on nasal continuous positive airway pressure, respiratory therapy assess nares and determine need for appliance change or resting period.  Outcome: Progressing     Problem: Safety - Adult  Goal: Free from fall injury  Outcome: Progressing

## 2024-06-15 NOTE — CARE COORDINATION
Case Management Assessment            Discharge Note                    Date / Time of Note: 6/15/2024 1:07 PM                  Discharge Note Completed by: MARIA C Ramirez    Patient Name: Jennifer Ulrich   YOB: 1960  Diagnosis: Alcohol abuse [F10.10]  Hyponatremia [E87.1]  Closed nondisplaced avulsion fracture of right talus, initial encounter [S92.154A]   Date / Time: 6/13/2024 11:31 AM    Current PCP: Lilia Grubbs MD  Clinic patient: No    Hospitalization in the last 30 days: No       Advance Directives:  Code Status: Full Code  [place] DNR form completed and on chart: Not Indicated    Financial:  Payor: MEDICARE / Plan: MEDICARE PART A AND B / Product Type: *No Product type* /      Pharmacy:    [id number] [place], [place] - [address] - P [phone] - F [phone]  [address] [address]  Phone: [phone] Fax: [phone]      Assistance purchasing medications?: Potential Assistance Purchasing Medications: No  Assistance provided by Case Management: None at this time    Does patient want to participate in local refill/ meds to beds program?:      Meds To Beds General Rules:  1. Can ONLY be done Monday- Friday between 8:30am-5pm  2. Prescription(s) must be in pharmacy by 3pm to be filled same day  3.Copy of patient's insurance/ prescription drug card and patient face sheet must be sent along with the prescription(s)  4. Cost of Rx cannot be added to hospital bill. If financial assistance is needed, please contact unit  or ;  or  CANNOT provide pharmacy voucher for patients co-pays  5. Patients can then  the prescription on their way out of the hospital at discharge, or pharmacy can deliver to the bedside if staff is available. (payment due at time of pick-up or delivery - cash, check, or card accepted)     Able to afford home medications/ co-pay costs:  [F10.10]  Hyponatremia [E87.1]  Closed nondisplaced avulsion fracture of right talus, initial encounter [S92.154A]    The Patient and/or patient representative Jennifer and her family were provided with a choice of provider and agrees with the discharge plan Yes    Freedom of choice list was provided with basic dialogue that supports the patient's individualized plan of care/goals and shares the quality data associated with the providers. Yes    Care Transitions patient: No    MARIA C Ramirez  [facility]  Case Management Department  Ph: [phone]  Fax: [phone]

## 2024-06-15 NOTE — PROGRESS NOTES
Discharge instrctions reviewed with pt and . Pt to d/c home with [facility] services and f/u with Dr. Noriega. IV and Tele removed. Pt d/c with all remaining medications and belongings.   Electronically signed by Rosalie Loo RN on 6/15/2024 at 2:05 PM

## 2024-06-15 NOTE — PROGRESS NOTES
Nephrology Progress Note                                                                                                                                                                                                                                                                                                       Office: [phone]       Fax:  [phone]      Patient's Name: Jennifer Ulrich  9:15 AM  6/15/2024    Reason for Consult:  hyponatremia  Requesting Physician:  Lilia Grubbs MD      Chief Complaint:  right foot hurts     Subjective     HPI:    Jennifer Ulrich is a 64 y.o. female with hx alcohol use disorder and hyponatremia. Presented to ED with right ankle plain which occurred when her right leg gave out causing ankle to invert. Denies LOC or head trauma. Denies N/V, but reports feeling shaking and weak which she attributes to low sodium as she has had this problem in the past due to alcohol use. She has been drinking 5-7 shots bourbon daily, last drink this AM 0400. She reports she has previously taken a break from alcohol for year before, and wants to quit alcohol permanently after this. She also reports reduced food intake over past week as well. Denies diarrhea, denies starting any new medications.     Tachycardic but HDS on arrival. CIWA elev 11. Right ankle avulsion vs impaction fx of talus    06/15/24     Interval History:    Resting in bed   at bedside  Ate breakfast  No N/V/D    Na improved  BP's controlled       Past Medical History:   Diagnosis Date    C. difficile diarrhea 05/15/2021    CAD (coronary artery disease)     Cervical dystonia     CHF (congestive heart failure) (HCC)     Dental crown present     upper right and bonding    Fatty liver     Hyperlipidemia     Hypertension     Lichen sclerosus     PONV (postoperative nausea and vomiting)        Past Surgical History:   Procedure Laterality Date    BREAST SURGERY      COLONOSCOPY      COLONOSCOPY N/A 7/2/2019    WBC 5.6 7.4   HGB 11.6* 11.9*    155       BMP:    Recent Labs     06/13/24  1850 06/14/24 0444 06/15/24  0414   * 126* 132*   K 4.5 5.4* 4.1   CL 88* 93* 96*   CO2 27 25 27   BUN 5* 5* 13   CREATININE <0.5* <0.5* <0.5*   GLUCOSE 112* 101* 110*       Ca/Mg/Phos:   Recent Labs     06/13/24  1218 06/13/24  1850 06/14/24  0444 06/15/24  0414   CALCIUM 9.7 9.9 10.2 9.8   MG 1.70*  --   --   --    PHOS  --  2.5  --  3.1       Hepatic: No results for input(s): \"AST\", \"ALT\", \"BILITOT\", \"ALKPHOS\" in the last 72 hours.    Invalid input(s): \"ALB\"  Troponin: No results for input(s): \"TROPONINI\" in the last 72 hours.  BNP: No results for input(s): \"BNP\" in the last 72 hours.  Lipids: No results for input(s): \"CHOL\", \"TRIG\", \"HDL\" in the last 72 hours.    Invalid input(s): \"LDLCALC\", \"LABVLDL\"  ABGs: No results for input(s): \"PHART\", \"PO2ART\", \"ONP5MGV\" in the last 72 hours.  INR: No results for input(s): \"INR\" in the last 72 hours.  UA:  Recent Labs     06/13/24  1451   COLORU Yellow   CLARITYU Clear   GLUCOSEU Negative   BILIRUBINUR Negative   KETUA 15*   BLOODU Negative   PHUR 6.5   PROTEINU Negative   UROBILINOGEN 0.2   NITRU Negative   LEUKOCYTESUR Negative   URINETYPE NotGiven        Urine Microscopic: No results for input(s): \"LABCAST\", \"BACTERIA\", \"COMU\", \"HYALCAST\", \"WBCUA\", \"RBCUA\" in the last 72 hours.    Invalid input(s): \"EPIU\"  Urine Culture: No results for input(s): \"LABURIN\" in the last 72 hours.  Urine Chemistry:   Recent Labs     06/13/24  2358   NAUR 90           IMAGING:  XR ANKLE RIGHT (MIN 3 VIEWS)   Final Result   1. Avulsion fracture or possible impaction fracture along the dorsal aspect of   the talus best identified on the lateral projection. Adjacent soft tissue   swelling.   2. No fracture or dislocation of the distal foot.      Electronically signed by Joseph Garcia      XR FOOT RIGHT (MIN 3 VIEWS)   Final Result   1. Avulsion fracture or possible impaction fracture along the dorsal

## 2024-06-16 NOTE — DISCHARGE SUMMARY
V2.0  Discharge Summary    Name:  Jennifer Ulrich /Age/Sex: 1960 (64 y.o. female)   Admit Date: 2024  Discharge Date: 6/15/24    MRN & CSN:  [id number] & [id number] Encounter Date and Time 6/15/24 11:23 PM EDT    Attending:  No att. providers found Discharging Provider: Manjit Gale MD       Hospital Course:     Jennifer Ulrich 64 y.o. female  Presented after a mechanical fall and Right Ankle pain    Mechanical fall, no reports of LOC leading to right ankle fracture suspect patient was intoxicated at the time of fall  Right ankle avulsion/impaction fracture of the dorsal aspect of the talus  RICE treatment, Orthopedic consulted, recommended Boot when out of bed, weightbearing as tolerated, already worked with physical therapy today and recommended home with home health care with 24/7 supervision and rolling walker     Alcohol dependence with withdrawal  CIWA protocol, Multivitamin, thiamine  Used ativan, she already uses this for her hx of cervical dystonia     Hyponatremia acute on chronic, low serum osm, Sodium 125 on admission  Dr. Rachel followed patient during hospitalization, Urine studies reviewed and suspected poor solute intake and increase ADH leading to hyponatremia  Recommended free water restriction  Urea OD on discharged  Will need close follow up with nephrology  Counseled on cessation of Etoh     Cervical dystonia, continue home Ambien/Ativan  GERD, continue home PPI     Hypertension/hypothyroidism, continue home medications    Patient was given prescription for Ativan 1 mg tid as she reports that she is almost out of her Ativan which she uses for Cervical dystonia. She was also prescribed oxy for pain control, recommended to use as less as possible and use tylenol/ibuprofen as primary analgesics    The patient expressed appropriate understanding of, and agreement with the discharge recommendations, medications, and plan.     Consults this admission:  IP CONSULT TO ORTHOPEDIC

## 2024-06-25 DIAGNOSIS — E87.1 HYPONATREMIA: ICD-10-CM

## 2024-06-26 LAB
ALBUMIN SERPL-MCNC: 4.5 G/DL (ref 3.4–5)
ANION GAP SERPL CALCULATED.3IONS-SCNC: 9 MMOL/L (ref 3–16)
BUN SERPL-MCNC: 10 MG/DL (ref 7–20)
CALCIUM SERPL-MCNC: 10.7 MG/DL (ref 8.3–10.6)
CHLORIDE SERPL-SCNC: 101 MMOL/L (ref 99–110)
CO2 SERPL-SCNC: 27 MMOL/L (ref 21–32)
CREAT SERPL-MCNC: 0.6 MG/DL (ref 0.6–1.2)
CREAT UR-MCNC: 293.3 MG/DL (ref 28–259)
GFR SERPLBLD CREATININE-BSD FMLA CKD-EPI: >90 ML/MIN/{1.73_M2}
GLUCOSE SERPL-MCNC: 114 MG/DL (ref 70–99)
MICROALBUMIN UR DL<=1MG/L-MCNC: 2.2 MG/DL
MICROALBUMIN/CREAT UR: 7.5 MG/G (ref 0–30)
PHOSPHATE SERPL-MCNC: 3.1 MG/DL (ref 2.5–4.9)
POTASSIUM SERPL-SCNC: 4.1 MMOL/L (ref 3.5–5.1)
SODIUM SERPL-SCNC: 137 MMOL/L (ref 136–145)

## 2024-08-20 ENCOUNTER — HOSPITAL ENCOUNTER (INPATIENT)
Age: 64
LOS: 1 days | Discharge: HOME | End: 2024-08-22
Attending: EMERGENCY MEDICINE | Admitting: INTERNAL MEDICINE
Payer: MEDICARE

## 2024-08-20 DIAGNOSIS — E86.0 DEHYDRATION: Primary | ICD-10-CM

## 2024-08-20 DIAGNOSIS — F10.930 ALCOHOL WITHDRAWAL SYNDROME WITHOUT COMPLICATION (HCC): ICD-10-CM

## 2024-08-20 DIAGNOSIS — G24.3 TORTICOLLIS, SPASMODIC: ICD-10-CM

## 2024-08-20 LAB
ALBUMIN SERPL-MCNC: 4.3 G/DL (ref 3.4–5)
ALBUMIN/GLOB SERPL: 1.3 {RATIO} (ref 1.1–2.2)
ALP SERPL-CCNC: 106 U/L (ref 40–129)
ALT SERPL-CCNC: 20 U/L (ref 10–40)
ANION GAP SERPL CALCULATED.3IONS-SCNC: 15 MMOL/L (ref 3–16)
AST SERPL-CCNC: 39 U/L (ref 15–37)
BASE EXCESS BLDV CALC-SCNC: -6.1 MMOL/L (ref -2–3)
BASOPHILS # BLD: 0.1 K/UL (ref 0–0.2)
BASOPHILS NFR BLD: 1.1 %
BILIRUB SERPL-MCNC: 0.4 MG/DL (ref 0–1)
BUN SERPL-MCNC: 13 MG/DL (ref 7–20)
CALCIUM SERPL-MCNC: 9.6 MG/DL (ref 8.3–10.6)
CHLORIDE SERPL-SCNC: 93 MMOL/L (ref 99–110)
CO2 BLDV-SCNC: 18 MMOL/L
CO2 SERPL-SCNC: 26 MMOL/L (ref 21–32)
COHGB MFR BLDV: 1.2 % (ref 0–1.5)
CREAT SERPL-MCNC: <0.5 MG/DL (ref 0.6–1.2)
DEPRECATED RDW RBC AUTO: 16 % (ref 12.4–15.4)
DO-HGB MFR BLDV: 24.3 %
EOSINOPHIL # BLD: 0.1 K/UL (ref 0–0.6)
EOSINOPHIL NFR BLD: 1.3 %
GFR SERPLBLD CREATININE-BSD FMLA CKD-EPI: >90 ML/MIN/{1.73_M2}
GLUCOSE SERPL-MCNC: 87 MG/DL (ref 70–99)
HCO3 BLDV-SCNC: 17 MMOL/L (ref 24–28)
HCT VFR BLD AUTO: 36.5 % (ref 36–48)
HGB BLD-MCNC: 12.6 G/DL (ref 12–16)
LACTATE BLDV-SCNC: 3.5 MMOL/L (ref 0.4–2)
LYMPHOCYTES # BLD: 2.8 K/UL (ref 1–5.1)
LYMPHOCYTES NFR BLD: 56.2 %
MCH RBC QN AUTO: 30.3 PG (ref 26–34)
MCHC RBC AUTO-ENTMCNC: 34.4 G/DL (ref 31–36)
MCV RBC AUTO: 88.1 FL (ref 80–100)
METHGB MFR BLDV: 0.3 % (ref 0–1.5)
MONOCYTES # BLD: 0.2 K/UL (ref 0–1.3)
MONOCYTES NFR BLD: 4.2 %
NEUTROPHILS # BLD: 1.9 K/UL (ref 1.7–7.7)
NEUTROPHILS NFR BLD: 37.2 %
PCO2 BLDV: 25.3 MMHG (ref 41–51)
PH BLDV: 7.43 [PH] (ref 7.35–7.45)
PLATELET # BLD AUTO: 178 K/UL (ref 135–450)
PMV BLD AUTO: 7.7 FL (ref 5–10.5)
PO2 BLDV: 43.1 MMHG (ref 25–40)
POTASSIUM SERPL-SCNC: 4 MMOL/L (ref 3.5–5.1)
PROT SERPL-MCNC: 7.6 G/DL (ref 6.4–8.2)
RBC # BLD AUTO: 4.14 M/UL (ref 4–5.2)
SAO2 % BLDV: 75 %
SODIUM SERPL-SCNC: 134 MMOL/L (ref 136–145)
WBC # BLD AUTO: 5 K/UL (ref 4–11)

## 2024-08-20 PROCEDURE — 36415 COLL VENOUS BLD VENIPUNCTURE: CPT

## 2024-08-20 PROCEDURE — 82746 ASSAY OF FOLIC ACID SERUM: CPT

## 2024-08-20 PROCEDURE — 82607 VITAMIN B-12: CPT

## 2024-08-20 PROCEDURE — 83605 ASSAY OF LACTIC ACID: CPT

## 2024-08-20 PROCEDURE — 80053 COMPREHEN METABOLIC PANEL: CPT

## 2024-08-20 PROCEDURE — 6370000000 HC RX 637 (ALT 250 FOR IP): Performed by: EMERGENCY MEDICINE

## 2024-08-20 PROCEDURE — 2580000003 HC RX 258: Performed by: EMERGENCY MEDICINE

## 2024-08-20 PROCEDURE — 82803 BLOOD GASES ANY COMBINATION: CPT

## 2024-08-20 PROCEDURE — 85025 COMPLETE CBC W/AUTO DIFF WBC: CPT

## 2024-08-20 PROCEDURE — 99285 EMERGENCY DEPT VISIT HI MDM: CPT

## 2024-08-20 RX ORDER — SODIUM CHLORIDE, SODIUM LACTATE, POTASSIUM CHLORIDE, AND CALCIUM CHLORIDE .6; .31; .03; .02 G/100ML; G/100ML; G/100ML; G/100ML
1000 INJECTION, SOLUTION INTRAVENOUS ONCE
Status: COMPLETED | OUTPATIENT
Start: 2024-08-20 | End: 2024-08-21

## 2024-08-20 RX ORDER — DIAZEPAM 10 MG
10 TABLET ORAL ONCE
Status: COMPLETED | OUTPATIENT
Start: 2024-08-20 | End: 2024-08-20

## 2024-08-20 RX ADMIN — DIAZEPAM 10 MG: 10 TABLET ORAL at 23:17

## 2024-08-20 RX ADMIN — SODIUM CHLORIDE, POTASSIUM CHLORIDE, SODIUM LACTATE AND CALCIUM CHLORIDE 1000 ML: 600; 310; 30; 20 INJECTION, SOLUTION INTRAVENOUS at 23:20

## 2024-08-20 ASSESSMENT — PAIN - FUNCTIONAL ASSESSMENT: PAIN_FUNCTIONAL_ASSESSMENT: 0-10

## 2024-08-20 ASSESSMENT — LIFESTYLE VARIABLES
HOW OFTEN DO YOU HAVE A DRINK CONTAINING ALCOHOL: 4 OR MORE TIMES A WEEK
HOW MANY STANDARD DRINKS CONTAINING ALCOHOL DO YOU HAVE ON A TYPICAL DAY: 1 OR 2

## 2024-08-20 ASSESSMENT — PAIN DESCRIPTION - LOCATION: LOCATION: NECK

## 2024-08-20 ASSESSMENT — PAIN DESCRIPTION - ORIENTATION: ORIENTATION: OUTER

## 2024-08-20 ASSESSMENT — PAIN DESCRIPTION - DESCRIPTORS: DESCRIPTORS: ACHING

## 2024-08-20 ASSESSMENT — PAIN SCALES - GENERAL: PAINLEVEL_OUTOF10: 8

## 2024-08-21 PROBLEM — F10.930 ALCOHOL WITHDRAWAL, UNCOMPLICATED (HCC): Status: ACTIVE | Noted: 2024-08-21

## 2024-08-21 LAB
ALBUMIN SERPL-MCNC: 4 G/DL (ref 3.4–5)
AMPHETAMINES UR QL SCN>1000 NG/ML: ABNORMAL
ANION GAP SERPL CALCULATED.3IONS-SCNC: 16 MMOL/L (ref 3–16)
BARBITURATES UR QL SCN>200 NG/ML: ABNORMAL
BASOPHILS # BLD: 0.1 K/UL (ref 0–0.2)
BASOPHILS NFR BLD: 1.2 %
BENZODIAZ UR QL SCN>200 NG/ML: POSITIVE
BUN SERPL-MCNC: 9 MG/DL (ref 7–20)
CALCIUM SERPL-MCNC: 9.2 MG/DL (ref 8.3–10.6)
CANNABINOIDS UR QL SCN>50 NG/ML: ABNORMAL
CHLORIDE SERPL-SCNC: 95 MMOL/L (ref 99–110)
CO2 SERPL-SCNC: 22 MMOL/L (ref 21–32)
COCAINE UR QL SCN: ABNORMAL
CREAT SERPL-MCNC: <0.5 MG/DL (ref 0.6–1.2)
DEPRECATED RDW RBC AUTO: 16.1 % (ref 12.4–15.4)
DRUG SCREEN COMMENT UR-IMP: ABNORMAL
EOSINOPHIL # BLD: 0 K/UL (ref 0–0.6)
EOSINOPHIL NFR BLD: 0.4 %
ETHANOLAMINE SERPL-MCNC: 247 MG/DL (ref 0–0.08)
FENTANYL SCREEN, URINE: ABNORMAL
GFR SERPLBLD CREATININE-BSD FMLA CKD-EPI: >90 ML/MIN/{1.73_M2}
GLUCOSE SERPL-MCNC: 82 MG/DL (ref 70–99)
HCT VFR BLD AUTO: 34 % (ref 36–48)
HGB BLD-MCNC: 11.3 G/DL (ref 12–16)
LACTATE BLDV-SCNC: 3.9 MMOL/L (ref 0.4–2)
LYMPHOCYTES # BLD: 1.1 K/UL (ref 1–5.1)
LYMPHOCYTES NFR BLD: 25.3 %
MCH RBC QN AUTO: 29.9 PG (ref 26–34)
MCHC RBC AUTO-ENTMCNC: 33.3 G/DL (ref 31–36)
MCV RBC AUTO: 89.8 FL (ref 80–100)
METHADONE UR QL SCN>300 NG/ML: ABNORMAL
MONOCYTES # BLD: 0.2 K/UL (ref 0–1.3)
MONOCYTES NFR BLD: 4.4 %
NEUTROPHILS # BLD: 3.1 K/UL (ref 1.7–7.7)
NEUTROPHILS NFR BLD: 68.7 %
OPIATES UR QL SCN>300 NG/ML: ABNORMAL
OXYCODONE UR QL SCN: ABNORMAL
PCP UR QL SCN>25 NG/ML: ABNORMAL
PH UR STRIP: 7 [PH]
PHOSPHATE SERPL-MCNC: 2.6 MG/DL (ref 2.5–4.9)
PLATELET # BLD AUTO: 145 K/UL (ref 135–450)
PMV BLD AUTO: 8.4 FL (ref 5–10.5)
POTASSIUM SERPL-SCNC: 3.8 MMOL/L (ref 3.5–5.1)
RBC # BLD AUTO: 3.79 M/UL (ref 4–5.2)
SODIUM SERPL-SCNC: 133 MMOL/L (ref 136–145)
TSH SERPL DL<=0.005 MIU/L-ACNC: 0.59 UIU/ML (ref 0.27–4.2)
WBC # BLD AUTO: 4.5 K/UL (ref 4–11)

## 2024-08-21 PROCEDURE — 84443 ASSAY THYROID STIM HORMONE: CPT

## 2024-08-21 PROCEDURE — 80307 DRUG TEST PRSMV CHEM ANLYZR: CPT

## 2024-08-21 PROCEDURE — 99223 1ST HOSP IP/OBS HIGH 75: CPT | Performed by: REGISTERED NURSE

## 2024-08-21 PROCEDURE — 1200000000 HC SEMI PRIVATE

## 2024-08-21 PROCEDURE — 6360000002 HC RX W HCPCS

## 2024-08-21 PROCEDURE — 82077 ASSAY SPEC XCP UR&BREATH IA: CPT

## 2024-08-21 PROCEDURE — 2580000003 HC RX 258

## 2024-08-21 PROCEDURE — 6370000000 HC RX 637 (ALT 250 FOR IP): Performed by: EMERGENCY MEDICINE

## 2024-08-21 PROCEDURE — 6370000000 HC RX 637 (ALT 250 FOR IP)

## 2024-08-21 PROCEDURE — 2580000003 HC RX 258: Performed by: EMERGENCY MEDICINE

## 2024-08-21 PROCEDURE — 83605 ASSAY OF LACTIC ACID: CPT

## 2024-08-21 PROCEDURE — HZ2ZZZZ DETOXIFICATION SERVICES FOR SUBSTANCE ABUSE TREATMENT: ICD-10-PCS | Performed by: INTERNAL MEDICINE

## 2024-08-21 PROCEDURE — 36415 COLL VENOUS BLD VENIPUNCTURE: CPT

## 2024-08-21 PROCEDURE — 85025 COMPLETE CBC W/AUTO DIFF WBC: CPT

## 2024-08-21 PROCEDURE — 80069 RENAL FUNCTION PANEL: CPT

## 2024-08-21 RX ORDER — LORAZEPAM 1 MG/1
4 TABLET ORAL
Status: DISCONTINUED | OUTPATIENT
Start: 2024-08-21 | End: 2024-08-22 | Stop reason: HOSPADM

## 2024-08-21 RX ORDER — POLYETHYLENE GLYCOL 3350 17 G/17G
17 POWDER, FOR SOLUTION ORAL DAILY PRN
Status: DISCONTINUED | OUTPATIENT
Start: 2024-08-21 | End: 2024-08-22 | Stop reason: HOSPADM

## 2024-08-21 RX ORDER — ACETAMINOPHEN 325 MG/1
650 TABLET ORAL EVERY 6 HOURS PRN
Status: DISCONTINUED | OUTPATIENT
Start: 2024-08-21 | End: 2024-08-22 | Stop reason: HOSPADM

## 2024-08-21 RX ORDER — LORAZEPAM 2 MG/ML
2 CONCENTRATE ORAL
Status: DISCONTINUED | OUTPATIENT
Start: 2024-08-21 | End: 2024-08-22 | Stop reason: HOSPADM

## 2024-08-21 RX ORDER — LORAZEPAM 2 MG/ML
1 CONCENTRATE ORAL
Status: DISCONTINUED | OUTPATIENT
Start: 2024-08-21 | End: 2024-08-22 | Stop reason: HOSPADM

## 2024-08-21 RX ORDER — CHLORDIAZEPOXIDE HYDROCHLORIDE 25 MG/1
25 CAPSULE, GELATIN COATED ORAL 4 TIMES DAILY
Status: DISCONTINUED | OUTPATIENT
Start: 2024-08-21 | End: 2024-08-21

## 2024-08-21 RX ORDER — ONDANSETRON 4 MG/1
4 TABLET, ORALLY DISINTEGRATING ORAL EVERY 8 HOURS PRN
Status: DISCONTINUED | OUTPATIENT
Start: 2024-08-21 | End: 2024-08-22 | Stop reason: HOSPADM

## 2024-08-21 RX ORDER — ZOLPIDEM TARTRATE 5 MG/1
5 TABLET ORAL NIGHTLY PRN
Status: DISCONTINUED | OUTPATIENT
Start: 2024-08-21 | End: 2024-08-22 | Stop reason: HOSPADM

## 2024-08-21 RX ORDER — PROMETHAZINE HYDROCHLORIDE 12.5 MG/1
12.5 TABLET ORAL EVERY 6 HOURS PRN
Status: DISCONTINUED | OUTPATIENT
Start: 2024-08-21 | End: 2024-08-22 | Stop reason: HOSPADM

## 2024-08-21 RX ORDER — GAUZE BANDAGE 2" X 2"
100 BANDAGE TOPICAL DAILY
Status: DISCONTINUED | OUTPATIENT
Start: 2024-08-21 | End: 2024-08-22 | Stop reason: HOSPADM

## 2024-08-21 RX ORDER — SODIUM CHLORIDE 0.9 % (FLUSH) 0.9 %
5-40 SYRINGE (ML) INJECTION EVERY 12 HOURS SCHEDULED
Status: DISCONTINUED | OUTPATIENT
Start: 2024-08-21 | End: 2024-08-22 | Stop reason: HOSPADM

## 2024-08-21 RX ORDER — ACETAMINOPHEN 650 MG/1
650 SUPPOSITORY RECTAL EVERY 6 HOURS PRN
Status: DISCONTINUED | OUTPATIENT
Start: 2024-08-21 | End: 2024-08-22 | Stop reason: HOSPADM

## 2024-08-21 RX ORDER — LANOLIN ALCOHOL/MO/W.PET/CERES
200 CREAM (GRAM) TOPICAL DAILY
Status: DISCONTINUED | OUTPATIENT
Start: 2024-08-21 | End: 2024-08-22 | Stop reason: HOSPADM

## 2024-08-21 RX ORDER — ONDANSETRON 2 MG/ML
4 INJECTION INTRAMUSCULAR; INTRAVENOUS EVERY 6 HOURS PRN
Status: DISCONTINUED | OUTPATIENT
Start: 2024-08-21 | End: 2024-08-22 | Stop reason: HOSPADM

## 2024-08-21 RX ORDER — LORAZEPAM 1 MG/1
3 TABLET ORAL
Status: DISCONTINUED | OUTPATIENT
Start: 2024-08-21 | End: 2024-08-22 | Stop reason: HOSPADM

## 2024-08-21 RX ORDER — LORAZEPAM 2 MG/ML
3 CONCENTRATE ORAL
Status: DISCONTINUED | OUTPATIENT
Start: 2024-08-21 | End: 2024-08-22 | Stop reason: HOSPADM

## 2024-08-21 RX ORDER — MULTIVITAMIN WITH IRON
100 TABLET ORAL DAILY
Status: DISCONTINUED | OUTPATIENT
Start: 2024-08-21 | End: 2024-08-22 | Stop reason: HOSPADM

## 2024-08-21 RX ORDER — SODIUM CHLORIDE 0.9 % (FLUSH) 0.9 %
5-40 SYRINGE (ML) INJECTION PRN
Status: DISCONTINUED | OUTPATIENT
Start: 2024-08-21 | End: 2024-08-22 | Stop reason: HOSPADM

## 2024-08-21 RX ORDER — POLYVINYL ALCOHOL 14 MG/ML
1 SOLUTION/ DROPS OPHTHALMIC NIGHTLY PRN
Status: DISCONTINUED | OUTPATIENT
Start: 2024-08-21 | End: 2024-08-22 | Stop reason: HOSPADM

## 2024-08-21 RX ORDER — PROMETHAZINE HYDROCHLORIDE 25 MG/ML
25 INJECTION, SOLUTION INTRAMUSCULAR; INTRAVENOUS ONCE
Status: CANCELLED | OUTPATIENT
Start: 2024-08-21 | End: 2024-08-21

## 2024-08-21 RX ORDER — LORAZEPAM 1 MG/1
2 TABLET ORAL
Status: DISCONTINUED | OUTPATIENT
Start: 2024-08-21 | End: 2024-08-22 | Stop reason: HOSPADM

## 2024-08-21 RX ORDER — LORAZEPAM 1 MG/1
1 TABLET ORAL
Status: DISCONTINUED | OUTPATIENT
Start: 2024-08-21 | End: 2024-08-22 | Stop reason: HOSPADM

## 2024-08-21 RX ORDER — ENOXAPARIN SODIUM 100 MG/ML
40 INJECTION SUBCUTANEOUS DAILY
Status: DISCONTINUED | OUTPATIENT
Start: 2024-08-21 | End: 2024-08-22 | Stop reason: HOSPADM

## 2024-08-21 RX ORDER — SODIUM CHLORIDE, SODIUM LACTATE, POTASSIUM CHLORIDE, AND CALCIUM CHLORIDE .6; .31; .03; .02 G/100ML; G/100ML; G/100ML; G/100ML
1000 INJECTION, SOLUTION INTRAVENOUS ONCE
Status: COMPLETED | OUTPATIENT
Start: 2024-08-21 | End: 2024-08-21

## 2024-08-21 RX ORDER — SODIUM CHLORIDE 9 MG/ML
INJECTION, SOLUTION INTRAVENOUS PRN
Status: DISCONTINUED | OUTPATIENT
Start: 2024-08-21 | End: 2024-08-22 | Stop reason: HOSPADM

## 2024-08-21 RX ORDER — VALSARTAN 80 MG/1
80 TABLET ORAL DAILY
Status: DISCONTINUED | OUTPATIENT
Start: 2024-08-21 | End: 2024-08-22 | Stop reason: HOSPADM

## 2024-08-21 RX ORDER — PANTOPRAZOLE SODIUM 40 MG/1
40 TABLET, DELAYED RELEASE ORAL
Status: DISCONTINUED | OUTPATIENT
Start: 2024-08-21 | End: 2024-08-22 | Stop reason: HOSPADM

## 2024-08-21 RX ORDER — LORAZEPAM 2 MG/ML
4 CONCENTRATE ORAL
Status: DISCONTINUED | OUTPATIENT
Start: 2024-08-21 | End: 2024-08-22 | Stop reason: HOSPADM

## 2024-08-21 RX ORDER — FOLIC ACID 1 MG/1
1000 TABLET ORAL DAILY
Status: DISCONTINUED | OUTPATIENT
Start: 2024-08-21 | End: 2024-08-22 | Stop reason: HOSPADM

## 2024-08-21 RX ADMIN — SODIUM CHLORIDE, POTASSIUM CHLORIDE, SODIUM LACTATE AND CALCIUM CHLORIDE 1000 ML: 600; 310; 30; 20 INJECTION, SOLUTION INTRAVENOUS at 02:00

## 2024-08-21 RX ADMIN — LORAZEPAM 1 MG: 1 TABLET ORAL at 21:09

## 2024-08-21 RX ADMIN — SODIUM CHLORIDE, PRESERVATIVE FREE 10 ML: 5 INJECTION INTRAVENOUS at 07:57

## 2024-08-21 RX ADMIN — ONDANSETRON 4 MG: 2 INJECTION INTRAMUSCULAR; INTRAVENOUS at 21:09

## 2024-08-21 RX ADMIN — SODIUM CHLORIDE, POTASSIUM CHLORIDE, SODIUM LACTATE AND CALCIUM CHLORIDE 1000 ML: 600; 310; 30; 20 INJECTION, SOLUTION INTRAVENOUS at 01:59

## 2024-08-21 RX ADMIN — CHLORDIAZEPOXIDE HYDROCHLORIDE 25 MG: 25 CAPSULE ORAL at 12:26

## 2024-08-21 RX ADMIN — ONDANSETRON 4 MG: 2 INJECTION INTRAMUSCULAR; INTRAVENOUS at 07:56

## 2024-08-21 RX ADMIN — FOLIC ACID 1000 MCG: 1 TABLET ORAL at 10:49

## 2024-08-21 RX ADMIN — PROMETHAZINE HYDROCHLORIDE 12.5 MG: 12.5 TABLET ORAL at 12:36

## 2024-08-21 RX ADMIN — LORAZEPAM 4 MG: 1 TABLET ORAL at 08:00

## 2024-08-21 RX ADMIN — LORAZEPAM 1 MG: 1 TABLET ORAL at 23:55

## 2024-08-21 RX ADMIN — LORAZEPAM 3 MG: 1 TABLET ORAL at 10:49

## 2024-08-21 RX ADMIN — Medication 100 MG: at 07:59

## 2024-08-21 RX ADMIN — LORAZEPAM 1 MG: 1 TABLET ORAL at 03:37

## 2024-08-21 RX ADMIN — Medication 5000 UNITS: at 08:00

## 2024-08-21 RX ADMIN — Medication 200 MG: at 08:01

## 2024-08-21 RX ADMIN — LORAZEPAM 1 MG: 1 TABLET ORAL at 01:52

## 2024-08-21 RX ADMIN — ACETAMINOPHEN 650 MG: 325 TABLET ORAL at 08:00

## 2024-08-21 RX ADMIN — LORAZEPAM 4 MG: 1 TABLET ORAL at 15:29

## 2024-08-21 RX ADMIN — LORAZEPAM 2 MG: 1 TABLET ORAL at 05:51

## 2024-08-21 RX ADMIN — PANTOPRAZOLE SODIUM 40 MG: 40 TABLET, DELAYED RELEASE ORAL at 06:51

## 2024-08-21 RX ADMIN — Medication 100 MG: at 08:01

## 2024-08-21 ASSESSMENT — ENCOUNTER SYMPTOMS
BLOOD IN STOOL: 0
VOMITING: 0
ABDOMINAL PAIN: 0
CONSTIPATION: 0
COUGH: 0
WHEEZING: 0
SHORTNESS OF BREATH: 0
DIARRHEA: 0

## 2024-08-21 ASSESSMENT — PAIN DESCRIPTION - DESCRIPTORS
DESCRIPTORS: ACHING;DISCOMFORT

## 2024-08-21 ASSESSMENT — PAIN DESCRIPTION - ORIENTATION
ORIENTATION: MID;LOWER

## 2024-08-21 ASSESSMENT — PAIN SCALES - GENERAL
PAINLEVEL_OUTOF10: 4
PAINLEVEL_OUTOF10: 7
PAINLEVEL_OUTOF10: 3
PAINLEVEL_OUTOF10: 4
PAINLEVEL_OUTOF10: 8
PAINLEVEL_OUTOF10: 2
PAINLEVEL_OUTOF10: 2

## 2024-08-21 ASSESSMENT — PAIN DESCRIPTION - ONSET
ONSET: ON-GOING

## 2024-08-21 ASSESSMENT — PAIN DESCRIPTION - PAIN TYPE
TYPE: ACUTE PAIN

## 2024-08-21 ASSESSMENT — PAIN DESCRIPTION - FREQUENCY
FREQUENCY: CONTINUOUS

## 2024-08-21 ASSESSMENT — PAIN DESCRIPTION - LOCATION
LOCATION: NECK

## 2024-08-21 ASSESSMENT — PAIN - FUNCTIONAL ASSESSMENT
PAIN_FUNCTIONAL_ASSESSMENT: ACTIVITIES ARE NOT PREVENTED
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
## 2024-08-21 NOTE — PLAN OF CARE
Problem: Discharge Planning  Goal: Discharge to home or other facility with appropriate resources  Outcome: Progressing  Flowsheets (Taken 8/21/2024 0624)  Discharge to home or other facility with appropriate resources:   Identify barriers to discharge with patient and caregiver   Arrange for needed discharge resources and transportation as appropriate   Identify discharge learning needs (meds, wound care, etc)   Arrange for interpreters to assist at discharge as needed  Note: Pt plans to discharge home with support of family.     Problem: Safety - Adult  Goal: Free from fall injury  Outcome: Progressing  Note: All fall / safety / seizure precautions in place. Bed locked in low position. Call light within reach.     Problem: Pain  Goal: Verbalizes/displays adequate comfort level or baseline comfort level  Outcome: Progressing  Flowsheets (Taken 8/21/2024 0624)  Verbalizes/displays adequate comfort level or baseline comfort level:   Encourage patient to monitor pain and request assistance   Assess pain using appropriate pain scale   Administer analgesics based on type and severity of pain and evaluate response   Implement non-pharmacological measures as appropriate and evaluate response  Note: Pain managed per MAR.

## 2024-08-21 NOTE — ED NOTES
been very severe.  Moreover she describes that she has been under increased psychosocial stressors recently, particularly as she has been told that she is going to need surgery for hyperparathyroidism, and the idea of the surgery is frightening.  As a result of this, she has been drinking alcohol in order to compensate for the loss of her benzodiazepine prescription.  The patient states that she has been drinking an increasing amount of alcohol daily, and today she had 12 ounces of bourbon.  Her  states that she has been increasingly weak, shaky, unable to get around the home, and he is having increasing difficulty taking care of her as a result.  They are concerned that she needs to detox.  They are also concerned that her sodium at a low, as it has been low when she has required admission for similar levels of alcohol intake in the past.     Review of Systems      Review of Systems   All other systems reviewed and are negative.        Past Medical, Surgical, Family, and Social History      She has a past medical history of C. difficile diarrhea, CAD (coronary artery disease), Cervical dystonia, CHF (congestive heart failure) (HCC), Dental crown present, Fatty liver, Hyperlipidemia, Hypertension, Lichen sclerosus, and PONV (postoperative nausea and vomiting).  She has a past surgical history that includes Breast surgery; Endoscopy, colon, diagnostic; Colonoscopy; Endometrial ablation; Colonoscopy (N/A, 7/2/2019); Colonoscopy (7/2/2019); Upper gastrointestinal endoscopy (N/A, 8/8/2023); Colonoscopy (N/A, 8/8/2023); and Upper gastrointestinal endoscopy (N/A, 8/8/2023).  Her family history includes Breast Cancer in her maternal aunt and maternal cousin; Breast Cancer (age of onset: 68) in her mother.  She reports that she has never smoked. She has never used smokeless tobacco. She reports current alcohol use of about 3.0 standard drinks of alcohol per week. She reports that she does not use drugs.        Background  History:   Past Medical History:   Diagnosis Date    C. difficile diarrhea 05/15/2021    CAD (coronary artery disease)     Cervical dystonia     CHF (congestive heart failure) (HCC)     Dental crown present     upper right and bonding    Fatty liver     Hyperlipidemia     Hypertension     Lichen sclerosus     PONV (postoperative nausea and vomiting)        Assessment    Vitals/MEWS: MEWS Score: 2  Level of Consciousness: Alert (0)   Vitals:    08/21/24 0106 08/21/24 0107 08/21/24 0108 08/21/24 0135   BP:    126/77   Pulse:    68   Resp:       Temp:       TempSrc:       SpO2: 98% 99% 99%    Weight:       Height:         FiO2 (%): N/A  O2 Flow Rate: O2 Device: Nasal cannula O2 Flow Rate (L/min): 2 L/min  Cardiac Rhythm:    Pain Assessment: 8 [x] Verbal [] Bustillos Caruso Scale  Pain Scale: Pain Assessment  Pain Assessment: 0-10  Pain Level: 8  Patient's Stated Pain Goal: 0 - No pain  Pain Location: Neck  Pain Orientation: Outer  Pain Descriptors: Aching  Last documented pain score (0-10 scale) Pain Level: 8  Last documented pain medication administered: See MAR  Mental Status: disoriented  NIH Score:    C-SSRS: Risk of Suicide: No Risk  Bedside swallow:    [place] Coma Scale (GCS): Maury Coma Scale  Eye Opening: Spontaneous  Best Verbal Response: Oriented  Best Motor Response: Obeys commands  Maury Coma Scale Score: 15  Active LDA's:   Peripheral IV 08/20/24 Left Antecubital (Active)     PO Status:   Pertinent or High Risk Medications/Drips: no   If Yes, please provide details: N/A  Pending Blood Product Administration: no     You may also review the ED PT Care Timeline found under the Summary Nursing Index tab.    Recommendation    Pending orders All ED orders Complete  Plan for Discharge (if known):   Additional Comments: Patient is disoriented, CIWA score is 9, 1st Lac 3.5 and 2nd is 3.9, 20 in R. AC currently running 2000 mL LR. Patient was given 1 mg of Ativan per KATEY, areli ham, recently  kaye.    If any further questions, please call Sending RN at [phone]    Electronically signed by: Electronically signed by PROMISE GARCIA RN on 8/21/2024 at 2:07 AM      Promise Garcia RN  08/21/24 8385

## 2024-08-21 NOTE — DISCHARGE INSTRUCTIONS
Alcohol and Substance Abuse Community Resources:    Information and Referrals:  [facility] ([facility]) 24 hours/ 7days - [phone]  [facility] [place] (24/7 hotline)- 134.220.9849([place]); [phone] ([place])  www.addictionservicescouncil.org;  [address], [place] [address]  ERASMO Armando II (Treatment consultant) - [phone]   (www.[employer]) or email: lars@[email or web address]  ERASMO Lee () - [phone]    Self Help Resources:  Alcoholics Anonymous Hotline (www.Celcuity)  (24 hours/ 7days) - 138.998.8569    [address] [facility] Room [address](enter on [facility]) [place], [place] [address]  [facility] [place] (www.[facility].org)- for [place] - 9-982-177-4660  Al-ANON . [place]/ [place] AL-ANON Information Services - 0-541-945-4487  (www.Spring.org)   Narcotics Anonymous (www.Instant [employer])  - 130.985.2994  [facility] (www.smartrecovery.org) - 228.826.3114    Suicide Hotlines: toll free and available 24/7  405-357-CARE (2273)  9-536-RBXWEDS (1-338.682.8593)  3-676-381-TALK (1-354-857-TALK) - Veterans Crisis Line  TTY: 0-103-812-4TTY    Detoxification Services/ Inpatient Treatment/ Residential Treatment Programs:  [facility] - [phone] [address], [place] [address]  [facility] ([facility]) - [phone]  [address], [place] [address]  [facility] - [phone]  [address] [place], [place] [address]  [facility] -[phone]  [address] [place], [place] [address] [place] [address] ([facility]) - [phone] ext. [phone]  [address] [place] [address] [place] - 0-268-752-7829 or 1-639-801-CARE  [address] [place] [address] [address] [place] [address] [facility] (www.[facility].[employer]) - [phone]  [address] [place], [place] [address]  [facility] - [phone]   [address] [place], [place] [address]  [facility] - [phone]  [address] [place], [place] [address]  [employer] (residential, outpatient) - [phone]  [address] [place], [place] [address]  [facility] - [phone]  [address] [place], [place] [address]  [place] - [phone] (must be [facility] resident)   [address] [place], [place] [address]  [facility] - [phone]     Crisis or Emergency Needs - [phone] (2273) available    [address] [place] [address]   [facility] - [phone] (www.ikron.org)     Inpatient/ Residential Treatment Just for Men:  [facility] - [phone]  [address] [place] [address] [address] [place] [address] (www.[facility].org) - [phone]  [address] [place], [place] [address] (Men)     Inpatient/ Residential Treatment Just for Women:  [facility] (www.[facility].org) - [phone]  [address] [place] [address] (women)  [facility]: Jenn Dias (pregnant &  treatment) - [phone]  [address] [place], [place] [address]  [place] - [phone] (must be [facility] resident;  services)   [address], [place] [address]  [facility] - [phone] (Pregnant and  treatment)   [address] [place], [place] [address]    Outpatient Treatment Services:  [facility] - [phone]   [address] [place], [place] [address]  [facility] - [phone]   [address] [place], [place] [address]  [facility] ([place], [place]) [phone]  [address] Alcohol and Drug Treatment Program ([facility]) - [phone]  [address] [place] [address] (4th floor

## 2024-08-21 NOTE — CARE COORDINATION
Case Management Assessment  Initial Evaluation    Date/Time of Evaluation: 8/21/2024 12:13 PM  Assessment Completed by: Zoraida Orona RN    If patient is discharged prior to next notation, then this note serves as note for discharge by case management.    Patient Name: Jennifer Ulrich                   YOB: 1960  Diagnosis: Dehydration [E86.0]  Alcohol withdrawal, uncomplicated (HCC) [F10.930]  Alcohol withdrawal syndrome without complication (HCC) [F10.930]                   Date / Time: 8/20/2024  9:49 PM    Patient Admission Status: Inpatient   Readmission Risk (Low < 19, Mod (19-27), High > 27): Readmission Risk Score: 16.1    Current PCP: Lilia Grubbs MD  PCP verified by CM? No    Chart Reviewed: Yes      History Provided by: Patient  Patient Orientation: Alert and Oriented    Patient Cognition: Alert    Hospitalization in the last 30 days (Readmission):  No    If yes, Readmission Assessment in  Navigator will be completed.    Advance Directives:      Code Status: Full Code   Patient's Primary Decision Maker is: Legal Next of Kin      Discharge Planning:    Patient lives with: Spouse/Significant Other Type of Home: House  Primary Care Giver: Self  Patient Support Systems include: Spouse/Significant Other, Children   Current Financial resources: None  Current community resources: None  Current services prior to admission: None            Current DME:              Type of Home Care services:  None    ADLS  Prior functional level: Independent in ADLs/IADLs  Current functional level: Independent in ADLs/IADLs    PT AM-PAC:   /24  OT AM-PAC:   /24    Family can provide assistance at DC: Yes  Would you like Case Management to discuss the discharge plan with any other family members/significant others, and if so, who? No  Plans to Return to Present Housing: Unknown at present  Other Identified Issues/Barriers to RETURNING to current housing: needs rehab  Potential Assistance needed at  discharge: N/A            Potential DME:    Patient expects to discharge to: House  Plan for transportation at discharge: Self    Financial    Payor: MEDICARE / Plan: MEDICARE PART A AND B / Product Type: *No Product type* /     Does insurance require precert for SNF: No    Potential assistance Purchasing Medications:    Meds-to-Beds request:        [id number] - [place], [place] - [address] - P [phone] - F [phone] [address] [facility] [address]  Phone: [phone] Fax: [phone]      Notes:    Factors facilitating achievement of predicted outcomes: Family support    Barriers to discharge: Anxiety    Additional Case Management Notes:   CM spoke with patient and spouse Elder at bedside.  Patient denies any needs for rehab at this time.  Patient states she is normally independent, drives self.  Resources for substance abuse placed on AVS.  Will continue to follow.    The Plan for Transition of Care is related to the following treatment goals of Dehydration [E86.0]  Alcohol withdrawal, uncomplicated (HCC) [F10.930]  Alcohol withdrawal syndrome without complication (HCC) [F10.930]    IF APPLICABLE: The Patient and/or patient representative Jennifer and her family were provided with a choice of provider and agrees with the discharge plan. Freedom of choice list with basic dialogue that supports the patient's individualized plan of care/goals and shares the quality data associated with the providers was provided to: Patient   Patient Representative Name:       The Patient and/or Patient Representative Agree with the Discharge Plan? Yes    Zoraida Orona RN  Case Management Department  Ph: [phone] Fax: [phone]

## 2024-08-21 NOTE — PROGRESS NOTES
Internal Medicine Progress Note    Patient Name: Jennifer Ulrich   Patient : 1960   Date: 2024   Admit Date: 2024     CC: Alcohol Intoxication (Pt presents to the ED with alcohol intoxication. Pt states that she had 12 oz of bourbon. Pt has misplaced her lorazepam from a few days ago. Pt states that she has been without lorazepam for the last 5 days for the first time since . Pt states she has a history of cervical dystonia. Pt states that she used to not drink, but started 5 days ago after learning some disappointing medical news that frightens her.)       Interval History     Patient seen and examined at bedside.  Patient does not have any new complaints and is concerned that her lorazepam and zolpidem was still very and she had not taken them for past 10 days.     The patient drinks around 1/5th of [place] with coke on most days. Her last drink was yesterday  with lunch. She drank 1 cup of [place]. She has receieved Ativan 4 times overnight as she was feeling 'shake-y' and anxious.    Patient has b/l LE neuropathy for almost 10 years. She does not take anything for it.     ROS   Review of Systems   Constitutional:  Negative for appetite change and fever.   Respiratory:  Negative for cough, shortness of breath and wheezing.    Cardiovascular:  Negative for chest pain, palpitations and leg swelling.   Gastrointestinal:  Negative for abdominal pain, blood in stool, constipation, diarrhea and vomiting.   Genitourinary:  Negative for flank pain and hematuria.   Musculoskeletal:         Tightness around neck muscles due to spasms   Neurological:  Negative for tremors, seizures, weakness and numbness.   Psychiatric/Behavioral:  Negative for behavioral problems and confusion.    All other systems reviewed and are negative.         Objective     I/Os:  I/O last 3 completed shifts:  In: 3000 [IV Piggyback:3000]  Out: 500 [Urine:500]      Vital Signs:  Patient Vitals for the past 8 hrs:   BP  Temp Temp src Pulse Resp SpO2   08/21/24 0745 137/77 97.8 °F (36.6 °C) Oral 73 18 95 %   08/21/24 0402 115/62 98.6 °F (37 °C) Temporal 76 20 93 %   08/21/24 0327 128/84 -- -- -- -- 96 %   08/21/24 0259 133/77 -- -- -- -- 99 %   08/21/24 0229 121/75 -- -- -- -- 97 %   08/21/24 0159 119/69 -- -- -- -- 97 %   08/21/24 0135 126/77 -- -- 68 -- --   08/21/24 0129 126/77 -- -- -- -- --   08/21/24 0108 -- -- -- -- -- 99 %   08/21/24 0107 -- -- -- -- -- 99 %   08/21/24 0106 -- -- -- -- -- 98 %   08/21/24 0105 -- -- -- -- -- 99 %   08/21/24 0104 115/66 -- -- -- -- 99 %   08/21/24 0103 -- -- -- -- -- 99 %   08/21/24 0102 -- -- -- -- -- 99 %   08/21/24 0101 -- -- -- -- -- 98 %   08/21/24 0100 111/70 -- -- -- -- 97 %   08/21/24 0059 -- -- -- -- -- 98 %   08/21/24 0058 -- -- -- -- -- 99 %   08/21/24 0057 -- -- -- -- -- 99 %   08/21/24 0056 -- -- -- -- -- 98 %   08/21/24 0055 -- -- -- -- -- 97 %   08/21/24 0054 -- -- -- -- -- 98 %   08/21/24 0053 -- -- -- -- -- 95 %   08/21/24 0052 -- -- -- -- -- (!) 84 %       Physical Exam:  Physical Exam  Constitutional:       Appearance: Normal appearance.   HENT:      Mouth/Throat:      Mouth: Mucous membranes are moist.   Eyes:      Extraocular Movements: Extraocular movements intact.   Cardiovascular:      Rate and Rhythm: Normal rate and regular rhythm.      Pulses: Normal pulses.   Pulmonary:      Breath sounds: Normal breath sounds.   Abdominal:      General: Bowel sounds are normal.      Palpations: Abdomen is soft.   Musculoskeletal:         General: Normal range of motion.   Skin:     General: Skin is warm.   Neurological:      General: No focal deficit present.      Mental Status: She is oriented to person, place, and time.   Psychiatric:         Mood and Affect: Mood normal.         Medications:   sodium chloride flush  5-40 mL IntraVENous 2 times per day    enoxaparin  40 mg SubCUTAneous Daily    pantoprazole  40 mg Oral QAM AC    [Held by provider] valsartan  80 mg Oral Daily
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
## 2024-08-21 NOTE — PROGRESS NOTES
chlordiazePOXIDE HCl     Dispensed Days Supply Quantity Provider Pharmacy   chlordiazepoxide 25 mg capsule 07/07/2022 10 24 capsule Zoraida Lainez APRN [facility]...   chlordiazepoxide 25 mg capsule 05/17/2021 5 15 [facility]                     diazePAM     Dispensed Days Supply Quantity Provider Pharmacy   DIAZEPAM 5MG TABLETS 11/29/2023 1 1 each Lilia Grubbs MD [facility] #...   DIAZEPAM 5MG TABLETS 02/12/2021 3 5 each [facility]                                             LORazepam     Dispensed Days Supply Quantity Provider Pharmacy   LORAZEPAM 1MG TABLETS 07/25/2024 90 180 each Lilia Grubbs MD [facility] #...   LORAZEPAM 1MG TABLETS 06/15/2024 5 15 each [facility] Gale [facility]   LORAZEPAM 1MG TABLETS 04/27/2024 90 180 each [facility]bi Akers [facility]   LORAZEPAM 1MG TABLETS 02/01/2024 90 180 each Romie, Lilia Sophie, MD [facility] #...   LORAZEPAM 1MG TABLETS 01/02/2024 30 60 each Lilia Grubbs MD [facility] #...   LORAZEPAM 1MG TABLETS 12/04/2023 30 50 each Lilia Grubbs MD [facility] #...   LORAZEPAM 1MG TABLETS 12/01/2023 5 10 each Lilia Grubbs MD [facility] #...   LORAZEPAM 1MG TABLETS 11/03/2023 30 60 each Lilia Grubbs MD [facility] #...   LORAZEPAM 1MG TABLETS 10/04/2023 30 60 each Lilia Grubbs MD [facility] #...   LORAZEPAM 1MG TABLETS 09/06/2023 30 60 each Romie, Lilia Sophie, MD [facility] #...   LORAZEPAM 1MG TABLETS 08/05/2023 30 60 each Lilia Grubbs MD [facility] #...   LORAZEPAM 1MG TABLETS 07/08/2023 30 60 each Lilia Grubbs MD [facility] #...   LORAZEPAM 1MG TABLETS 06/10/2023 30 60 each Lilia Grubbs MD [facility]
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
## 2024-08-22 VITALS
SYSTOLIC BLOOD PRESSURE: 132 MMHG | WEIGHT: 189.8 LBS | TEMPERATURE: 98.4 F | BODY MASS INDEX: 27.17 KG/M2 | DIASTOLIC BLOOD PRESSURE: 84 MMHG | HEART RATE: 58 BPM | RESPIRATION RATE: 16 BRPM | HEIGHT: 70 IN | OXYGEN SATURATION: 98 %

## 2024-08-22 LAB
ALBUMIN SERPL-MCNC: 3.9 G/DL (ref 3.4–5)
ANION GAP SERPL CALCULATED.3IONS-SCNC: 10 MMOL/L (ref 3–16)
BASOPHILS # BLD: 0 K/UL (ref 0–0.2)
BASOPHILS NFR BLD: 0.6 %
BUN SERPL-MCNC: 10 MG/DL (ref 7–20)
CALCIUM SERPL-MCNC: 9.6 MG/DL (ref 8.3–10.6)
CHLORIDE SERPL-SCNC: 96 MMOL/L (ref 99–110)
CO2 SERPL-SCNC: 27 MMOL/L (ref 21–32)
CREAT SERPL-MCNC: 0.6 MG/DL (ref 0.6–1.2)
DEPRECATED RDW RBC AUTO: 16 % (ref 12.4–15.4)
EOSINOPHIL # BLD: 0.1 K/UL (ref 0–0.6)
EOSINOPHIL NFR BLD: 1.3 %
FOLATE SERPL-MCNC: 11.5 NG/ML (ref 4.78–24.2)
GFR SERPLBLD CREATININE-BSD FMLA CKD-EPI: >90 ML/MIN/{1.73_M2}
GLUCOSE SERPL-MCNC: 90 MG/DL (ref 70–99)
HCT VFR BLD AUTO: 34.2 % (ref 36–48)
HGB BLD-MCNC: 11.4 G/DL (ref 12–16)
LYMPHOCYTES # BLD: 1.4 K/UL (ref 1–5.1)
LYMPHOCYTES NFR BLD: 23.6 %
MAGNESIUM SERPL-MCNC: 1.8 MG/DL (ref 1.8–2.4)
MCH RBC QN AUTO: 30 PG (ref 26–34)
MCHC RBC AUTO-ENTMCNC: 33.3 G/DL (ref 31–36)
MCV RBC AUTO: 90.1 FL (ref 80–100)
MONOCYTES # BLD: 0.3 K/UL (ref 0–1.3)
MONOCYTES NFR BLD: 4.9 %
NEUTROPHILS # BLD: 4 K/UL (ref 1.7–7.7)
NEUTROPHILS NFR BLD: 69.6 %
PHOSPHATE SERPL-MCNC: 3.3 MG/DL (ref 2.5–4.9)
PLATELET # BLD AUTO: 127 K/UL (ref 135–450)
PMV BLD AUTO: 8.3 FL (ref 5–10.5)
POTASSIUM SERPL-SCNC: 4.2 MMOL/L (ref 3.5–5.1)
RBC # BLD AUTO: 3.8 M/UL (ref 4–5.2)
SODIUM SERPL-SCNC: 133 MMOL/L (ref 136–145)
VIT B12 SERPL-MCNC: 1273 PG/ML (ref 211–911)
WBC # BLD AUTO: 5.8 K/UL (ref 4–11)

## 2024-08-22 PROCEDURE — 97530 THERAPEUTIC ACTIVITIES: CPT

## 2024-08-22 PROCEDURE — 80069 RENAL FUNCTION PANEL: CPT

## 2024-08-22 PROCEDURE — 97166 OT EVAL MOD COMPLEX 45 MIN: CPT

## 2024-08-22 PROCEDURE — 36415 COLL VENOUS BLD VENIPUNCTURE: CPT

## 2024-08-22 PROCEDURE — 2580000003 HC RX 258

## 2024-08-22 PROCEDURE — 85025 COMPLETE CBC W/AUTO DIFF WBC: CPT

## 2024-08-22 PROCEDURE — 97116 GAIT TRAINING THERAPY: CPT

## 2024-08-22 PROCEDURE — 97162 PT EVAL MOD COMPLEX 30 MIN: CPT

## 2024-08-22 PROCEDURE — 6370000000 HC RX 637 (ALT 250 FOR IP)

## 2024-08-22 PROCEDURE — 83735 ASSAY OF MAGNESIUM: CPT

## 2024-08-22 PROCEDURE — 97535 SELF CARE MNGMENT TRAINING: CPT

## 2024-08-22 RX ORDER — SENNA AND DOCUSATE SODIUM 50; 8.6 MG/1; MG/1
2 TABLET, FILM COATED ORAL DAILY PRN
Status: DISCONTINUED | OUTPATIENT
Start: 2024-08-22 | End: 2024-08-22 | Stop reason: HOSPADM

## 2024-08-22 RX ORDER — LORAZEPAM 1 MG/1
1 TABLET ORAL 2 TIMES DAILY
Qty: 8 TABLET | Refills: 0 | Status: SHIPPED | OUTPATIENT
Start: 2024-08-22 | End: 2024-08-26

## 2024-08-22 RX ORDER — ONDANSETRON 4 MG/1
4 TABLET, ORALLY DISINTEGRATING ORAL EVERY 8 HOURS PRN
Qty: 10 TABLET | Refills: 0 | Status: SHIPPED | OUTPATIENT
Start: 2024-08-22

## 2024-08-22 RX ORDER — THIAMINE MONONITRATE (VIT B1) 100 MG
100 TABLET ORAL DAILY
Qty: 30 TABLET | Refills: 0 | Status: SHIPPED | OUTPATIENT
Start: 2024-08-22 | End: 2024-09-21

## 2024-08-22 RX ADMIN — LORAZEPAM 1 MG: 1 TABLET ORAL at 11:08

## 2024-08-22 RX ADMIN — LORAZEPAM 1 MG: 1 TABLET ORAL at 09:30

## 2024-08-22 RX ADMIN — Medication 100 MG: at 09:31

## 2024-08-22 RX ADMIN — LORAZEPAM 1 MG: 1 TABLET ORAL at 03:58

## 2024-08-22 RX ADMIN — FOLIC ACID 1000 MCG: 1 TABLET ORAL at 09:31

## 2024-08-22 RX ADMIN — SENNOSIDES AND DOCUSATE SODIUM 2 TABLET: 50; 8.6 TABLET ORAL at 03:58

## 2024-08-22 RX ADMIN — Medication 5000 UNITS: at 09:31

## 2024-08-22 RX ADMIN — Medication 200 MG: at 09:31

## 2024-08-22 RX ADMIN — PANTOPRAZOLE SODIUM 40 MG: 40 TABLET, DELAYED RELEASE ORAL at 06:45

## 2024-08-22 RX ADMIN — LORAZEPAM 1 MG: 1 TABLET ORAL at 14:35

## 2024-08-22 RX ADMIN — SODIUM CHLORIDE, PRESERVATIVE FREE 10 ML: 5 INJECTION INTRAVENOUS at 09:31

## 2024-08-22 ASSESSMENT — PAIN SCALES - GENERAL
PAINLEVEL_OUTOF10: 0
PAINLEVEL_OUTOF10: 0

## 2024-08-22 ASSESSMENT — ENCOUNTER SYMPTOMS
ABDOMINAL PAIN: 0
DIARRHEA: 0
BLOOD IN STOOL: 0
SHORTNESS OF BREATH: 0
COUGH: 0
CONSTIPATION: 0
VOMITING: 0
WHEEZING: 0

## 2024-08-22 NOTE — PLAN OF CARE
Problem: Discharge Planning  Goal: Discharge to home or other facility with appropriate resources  8/22/2024 1313 by Steph Medley, RN  Outcome: Progressing  Note: Patient is from home with spouse. Will return at discharge. Substance abuse resources placed in AVS by social work. Plan of care continues.      Problem: Safety - Adult  Goal: Free from fall injury  8/22/2024 1313 by Steph Medley, RN  Outcome: Progressing  Note: Fall precautions in place. Non-skid socks on. Bed locked in lowest position with alarm on and side rails up. Bedside table, belongings, and call light within reach. Patient calling out appropriately when needing assistance. Hourly rounding by RN. Floor clean and free from clutter. Room door open. Plan of care continues.      Problem: Pain  Goal: Verbalizes/displays adequate comfort level or baseline comfort level  8/22/2024 1315 by Steph Medley, RN  Outcome: Progressing  Flowsheets (Taken 8/22/2024 1315)  Verbalizes/displays adequate comfort level or baseline comfort level:   Encourage patient to monitor pain and request assistance   Assess pain using appropriate pain scale   Administer analgesics based on type and severity of pain and evaluate response   Implement non-pharmacological measures as appropriate and evaluate response  Note: Denies having any pain. Using numerical pain rating scale appropriately. PRN tylenol available for mild pain and patient made aware. Able to turn and reposition self independently for comfort and pressure relief. Notified to let RN know if pain manifests and medication is needed. Plan of care continues.

## 2024-08-22 NOTE — PROGRESS NOTES
Pt a/o x4. VSS on RA with exception to intermittent low HR. Scoring on CIWA, receiving Ativan as needed. Voiding adequately via purewick. Fall precautions are in place.

## 2024-08-22 NOTE — PROGRESS NOTES
Contacted patient's primary care physician clinic, Dr. Lilia Grubbs, gave a brief detail about patient's hospitalization due to alcohol intoxication and her benzodiazepine dependence.  Booked an appointment for the patient with her PCP on Monday, 8/26/2024 for further reevaluation and refill of her lorazepam.  Patient was given a 4-day supply of her Ativan to cover her for the weekend till she sees her PCP.    The patient was informed about her appointment with the PCP.

## 2024-08-22 NOTE — PROGRESS NOTES
Physical Therapy  Facility/Department: [facility] ORTHO/NEURO  Physical Therapy Initial Assessment/Treatment    Name: Jennifer Ulrich  : 1960  MRN: [id number]  Date of Service: 2024    Discharge Recommendations:  24 hour supervision or assist   PT Equipment Recommendations  Equipment Needed: No      Patient Diagnosis(es): The primary encounter diagnosis was Dehydration. Diagnoses of Alcohol withdrawal syndrome without complication (HCC) and Torticollis, spasmodic were also pertinent to this visit.  Past Medical History:  has a past medical history of C. difficile diarrhea, CAD (coronary artery disease), Cervical dystonia, CHF (congestive heart failure) (HCC), Dental crown present, Fatty liver, Hyperlipidemia, Hypertension, Lichen sclerosus, and PONV (postoperative nausea and vomiting).  Past Surgical History:  has a past surgical history that includes Breast surgery; Endoscopy, colon, diagnostic; Colonoscopy; Endometrial ablation; Colonoscopy (N/A, 2019); Colonoscopy (2019); Upper gastrointestinal endoscopy (N/A, 2023); Colonoscopy (N/A, 2023); and Upper gastrointestinal endoscopy (N/A, 2023).    Assessment  Body Structures, Functions, Activity Limitations Requiring Skilled Therapeutic Intervention: Decreased functional mobility   Assessment: 64 y.o.female with hx of cervical dystonia, alcohol use disorder, GERD, hyperparathyroidism, who presented to the ED on 2024 with uncontrolled cervical dystonia pain, significant stress and anxiety, leading to significant increased alcohol intake, and concerns for increasing weakness, fatigue, shakiness, inability to perform ADLs at home. Pt currently Mod I for bed mobility, SBA for transfers and amb with RW and CGA for stair negotiation with rails. Pt is below her baseline and would benefit from further skilled PT to maximize safety and independence with functional mobility. Will continue to follow.  Treatment Diagnosis: Decreased functional  SW - one on each floor)  Has the patient had two or more falls in the past year or any fall with injury in the past year?: No (1 fall, injured R ankle, was in boot for 5 weeks)  ADL Assistance: Needs assistance ( provides supv for showering and dries her feet before getting out..)  Homemaking Assistance: Independent (shares with spouse)  Ambulation Assistance: Independent  Transfer Assistance: Independent  Active : Yes  Occupation: Retired  Leisure & Hobbies: walking dog, boating, spending time with grandkids  Additional Comments: multiple family members live nearby.  Vision/Hearing  Vision  Vision: Impaired  Vision Exceptions: Wears glasses for reading;Wears glasses for distance  Hearing  Hearing: Within functional limits    Cognition   Orientation  Overall Orientation Status: Within Normal Limits  Orientation Level: Oriented X4  Cognition  Overall Cognitive Status: WNL    Objective  Temp: 98.4 °F (36.9 °C)  Pulse: 58  Heart Rate Source: Monitor  Respirations: 16  SpO2: 98 %  O2 Device: None (Room air)  BP: 132/84  MAP (Calculated): 100  BP Location: Right upper arm  BP Method: Automatic  Patient Position: High fowlers             AROM RLE (degrees)  RLE AROM: WFL  AROM LLE (degrees)  LLE AROM : WFL  Strength RLE  Strength RLE: WFL  Strength LLE  Strength LLE: WFL        Balance  Sitting: Intact  Standing:  (SBA)  Bed mobility  Supine to Sit: Modified independent (HOB elevated, bedrail)  Scooting: Independent  Transfers  Sit to Stand: Stand by assistance  Stand to Sit: Stand by assistance  Ambulation  Surface: Level tile  Device: Rolling Walker  Assistance: Stand by assistance  Quality of Gait: moderate magdiel, stride length and Yossi. Overall steady with no LOB or near LOB.  Distance: 10'+20'+200'  Stairs/Curb  Stairs?: Yes  Stairs  # Steps : 5  Stairs Height: 6\"  Rails: Bilateral  Device: No Device  Assistance: Contact guard assistance  Comment: safe and steady     Balance  Posture: Good  Sitting -

## 2024-08-22 NOTE — PROGRESS NOTES
Discharge order placed.  will transport home. PIV removed and dressing placed. 3 prescriptions (lorazepam, ondansetron, and vitamin B-1) sent to preferred pharmacy to be filled. Appointment made with PCP by hospital resident for Monday, 8/26, and patient made aware. Discharge instructions discussed with patient and , both verbalized understanding. All questions answered. Patient to car in wheelchair with all belongings.

## 2024-08-22 NOTE — CARE COORDINATION
CM following: Pt declined referrals to substance use referrals and reports being independent at baseline from home with spouse. Substance abuse resources placed on her AVS. No additional CM needs identified for discharge at this time.  Electronically signed by MARIA C Villa on 8/22/2024 at 10:12 AM  [phone]

## 2024-08-22 NOTE — DISCHARGE SUMMARY
INTERNAL MEDICINE DEPARTMENT AT THE [facility]  DISCHARGE SUMMARY    Patient ID: Jennifer Ulrich                                             Discharge Date: 8/22/24   Patient's PCP: Lilia Grubbs MD                                          Discharge Physician: Khoa Lee MD  Admit Date: 8/20/2024   Admitting Physician: Rc Herrera MD    PROBLEMS DURING HOSPITALIZATION:  Present on Admission:   Alcohol withdrawal, uncomplicated (HCC)      Brief HPI:  Ms. Jennifer Ulrich is a 64 y.o. female with a MHx significant for, cervical dystonia, alcohol use disorder, GERD, hyperparathyroidism, who presented to the ED on 8/20/2024 with alcohol intoxication.     Alcohol Use Disorder and Benzodiazapine Dependence  The patient drinks around 1/5th of Logan with coke on most days. Her last drink was yesterday 8/20 with lunch. She drank 2 0.5 cup of Logan. Her labs were significant were for mildly elevated lactate of 3.5>3.9. In the ED she appeared intoxicated and was given 3 L LR, 10 mg diazepam and 1 mg of lorazepam. Avoided Librium and other benzodiazepine as patient has benzodiazapine dependency in the setting of alcohol use disorder. The following day the patient appeared well and did not had any withdrawal symptoms. Psych was consulted and patient was given options to switch to other medications but she remained  adamant ton taking Lorazepam as it controls her spasms really well and she has been stable on them for a very long time.     Of note her Lorazepam was filled for 90  on 7/25/2024.  She takes Takes 2mg daily (180 tablets dispensed). As per the patient, she had workers comnig in and out of her house and she suspects that they might have stolen her Lorazepam.      Patient's primary care physician (Dr. Lilia Grubbs) clinic was contacted, and  an appointment was booked for the patient on Monday, 8/26/2024 for further reevaluation and refill of her lorazepam.  Patient was given a 4-day

## 2024-08-22 NOTE — PLAN OF CARE
Problem: Discharge Planning  Goal: Discharge to home or other facility with appropriate resources  Outcome: Progressing     Problem: Safety - Adult  Goal: Free from fall injury  Outcome: Progressing     Problem: Pain  Goal: Verbalizes/displays adequate comfort level or baseline comfort level  Outcome: Progressing     Problem: Confusion  Goal: Confusion, delirium, dementia, or psychosis is improved or at baseline  Description: INTERVENTIONS:  1. Assess for possible contributors to thought disturbance, including medications, impaired vision or hearing, underlying metabolic abnormalities, dehydration, psychiatric diagnoses, and notify attending LIP  2. [place] high risk fall precautions, as indicated  3. Provide frequent short contacts to provide reality reorientation, refocusing and direction  4. Decrease environmental stimuli, including noise as appropriate  5. Monitor and intervene to maintain adequate nutrition, hydration, elimination, sleep and activity  6. If unable to ensure safety without constant attention obtain sitter and review sitter guidelines with assigned personnel  7. Initiate Psychosocial CNS and Spiritual Care consult, as indicated  Outcome: Progressing

## 2024-08-22 NOTE — PROGRESS NOTES
Internal Medicine Progress Note    Patient Name: Jennifer Ulrich   Patient : 1960   Date: 2024   Admit Date: 2024     CC: Alcohol Intoxication (Pt presents to the ED with alcohol intoxication. Pt states that she had 12 oz of bourbon. Pt has misplaced her lorazepam from a few days ago. Pt states that she has been without lorazepam for the last 5 days for the first time since . Pt states she has a history of cervical dystonia. Pt states that she used to not drink, but started 5 days ago after learning some disappointing medical news that frightens her.)       Interval History     The patient is doing well today and states that she is much less \"shakey\" than yesterday. The patient reports doing very well, able to ambulate herself and would like to return home.     Discussed with the patient other different options for her cervical dystonia that were recommended by the psych consult. The patient remains adamant that she does not want to be off her Lorazepam as it controls her spasms really well and she has been stable on them for a very long time. Discussed the risks of taking benzodiazepines with alcohol such as respiratory depression. The patient endorses understanding the risks associated and states that she will not be drinking anymore in the future.       ROS   Review of Systems   Constitutional:  Negative for appetite change and fever.   Respiratory:  Negative for cough, shortness of breath and wheezing.    Cardiovascular:  Negative for chest pain, palpitations and leg swelling.   Gastrointestinal:  Negative for abdominal pain, blood in stool, constipation, diarrhea and vomiting.   Genitourinary:  Negative for flank pain and hematuria.   Musculoskeletal:  Negative for arthralgias.   Neurological:  Negative for tremors, seizures, weakness and numbness.   Psychiatric/Behavioral:  Negative for behavioral problems and confusion.    All other systems reviewed and are negative.

## 2024-08-22 NOTE — PROGRESS NOTES
Occupational Therapy  Facility/Department: [facility] ORTHO/NEURO  Occupational Therapy Initial Assessment and Treatment    Name: Jennifer Ulrich  : 1960  MRN: [id number]  Date of Service: 2024    Discharge Recommendations:  24 hour supervision or assist (initial)  OT Equipment Recommendations  Equipment Needed: No (has needed equipment in place)       Patient Diagnosis(es): The primary encounter diagnosis was Dehydration. Diagnoses of Alcohol withdrawal syndrome without complication (HCC) and Torticollis, spasmodic were also pertinent to this visit.      Treatment Diagnosis: impaired ADLs/transfers      Assessment   Performance deficits / Impairments: Decreased functional mobility ;Decreased ADL status  Assessment: Presenting w/ alcohol intoxication and c/o generalized weakness. Today, pt performing ADLs, functional transfers and mobility using RW and SBA. Does not typically use RW. Pt reports this is the first day she has been OOB since admission. Anticipate pt will continue to make functional progress with increased opportunities for OOB/mobility. Recommend home w/ initial 24 hr S. No DME needs. Continue to follow during admission.  Treatment Diagnosis: impaired ADLs/transfers  Prognosis: Good  Decision Making: Medium Complexity  REQUIRES OT FOLLOW-UP: Yes  Activity Tolerance  Activity Tolerance: Patient Tolerated treatment well  Activity Tolerance Comments: but w/ c/o feeling exhausted and some lightheadedness after self-disimpacting BM -vitals as follows: 153/87, HR 68-69, O2 sats on RA 96%.        Plan   Occupational Therapy Plan  Times Per Week: 2-5  Current Treatment Recommendations: Balance training, Functional mobility training, Endurance training, Safety education & training, Self-Care / ADL     Restrictions  Position Activity Restriction  Other position/activity restrictions: up with assist    Subjective   General  Chart Reviewed: Yes  Additional Pertinent Hx: 64 y.o. F who presents with  education                                                    AM-PAC - ADL  AM-PAC Daily Activity - Inpatient   How much help is needed for putting on and taking off regular lower body clothing?: A Little  How much help is needed for bathing (which includes washing, rinsing, drying)?: A Little  How much help is needed for toileting (which includes using toilet, bedpan, or urinal)?: A Little  How much help is needed for putting on and taking off regular upper body clothing?: None  How much help is needed for taking care of personal grooming?: A Little  How much help for eating meals?: None  AMMerged with [facility] Inpatient Daily Activity Raw Score: 20  AM-PAC Inpatient ADL T-Scale Score : 42.03  ADL Inpatient CMS 0-100% Score: 38.32  ADL Inpatient CMS G-Code Modifier : CJ           Goals  Short Term Goals  Time Frame for Short Term Goals: Discharge  Short Term Goal 1: toilet transfer w/ Supervision  Short Term Goal 2: LB dressing w/ Supervision  Short Term Goal 3: improve functional mobility using LRAD to Supervision  Patient Goals   Patient goals : no goals stated       Therapy Time   Individual Concurrent Group Co-treatment   Time In 1340         Time Out 1433         Minutes 53             Timed Code Treatment Minutes:   38    Total Treatment Minutes:  53      Yi Krishnamurthy OTR/L #[id number]

## 2024-08-23 DIAGNOSIS — E87.1 HYPONATREMIA: ICD-10-CM

## 2024-08-23 DIAGNOSIS — E87.1 HYPONATREMIA: Primary | ICD-10-CM

## 2024-10-20 ENCOUNTER — HOSPITAL ENCOUNTER (EMERGENCY)
Age: 64
Discharge: HOME | End: 2024-10-21
Attending: EMERGENCY MEDICINE
Payer: MEDICARE

## 2024-10-20 DIAGNOSIS — N30.00 ACUTE CYSTITIS WITHOUT HEMATURIA: Primary | ICD-10-CM

## 2024-10-20 LAB
GLUCOSE BLD-MCNC: 147 MG/DL (ref 70–99)
PERFORMED ON: ABNORMAL

## 2024-10-20 PROCEDURE — 99284 EMERGENCY DEPT VISIT MOD MDM: CPT

## 2024-10-20 ASSESSMENT — PAIN - FUNCTIONAL ASSESSMENT: PAIN_FUNCTIONAL_ASSESSMENT: NONE - DENIES PAIN

## 2024-10-21 VITALS
OXYGEN SATURATION: 94 % | RESPIRATION RATE: 16 BRPM | TEMPERATURE: 97.9 F | HEART RATE: 93 BPM | BODY MASS INDEX: 26.83 KG/M2 | DIASTOLIC BLOOD PRESSURE: 88 MMHG | HEIGHT: 70 IN | SYSTOLIC BLOOD PRESSURE: 129 MMHG | WEIGHT: 187.4 LBS

## 2024-10-21 LAB
ANION GAP SERPL CALCULATED.3IONS-SCNC: 17 MMOL/L (ref 3–16)
BACTERIA URNS QL MICRO: ABNORMAL /HPF
BILIRUB UR QL STRIP.AUTO: NEGATIVE
BUN SERPL-MCNC: 10 MG/DL (ref 7–20)
CALCIUM SERPL-MCNC: 10.2 MG/DL (ref 8.3–10.6)
CHLORIDE SERPL-SCNC: 91 MMOL/L (ref 99–110)
CLARITY UR: CLEAR
CO2 SERPL-SCNC: 21 MMOL/L (ref 21–32)
COLOR UR: YELLOW
CREAT SERPL-MCNC: <0.5 MG/DL (ref 0.6–1.2)
EKG ATRIAL RATE: 92 BPM
EKG DIAGNOSIS: NORMAL
EKG P AXIS: 2 DEGREES
EKG P-R INTERVAL: 180 MS
EKG Q-T INTERVAL: 350 MS
EKG QRS DURATION: 102 MS
EKG QTC CALCULATION (BAZETT): 432 MS
EKG R AXIS: 56 DEGREES
EKG T AXIS: 106 DEGREES
EKG VENTRICULAR RATE: 92 BPM
GFR SERPLBLD CREATININE-BSD FMLA CKD-EPI: >90 ML/MIN/{1.73_M2}
GLUCOSE SERPL-MCNC: 164 MG/DL (ref 70–99)
GLUCOSE UR STRIP.AUTO-MCNC: NEGATIVE MG/DL
HGB UR QL STRIP.AUTO: NEGATIVE
KETONES UR STRIP.AUTO-MCNC: NEGATIVE MG/DL
LEUKOCYTE ESTERASE UR QL STRIP.AUTO: ABNORMAL
MAGNESIUM SERPL-MCNC: 1.6 MG/DL (ref 1.8–2.4)
NITRITE UR QL STRIP.AUTO: POSITIVE
PH UR STRIP.AUTO: 6 [PH] (ref 5–8)
PHOSPHATE SERPL-MCNC: 2.2 MG/DL (ref 2.5–4.9)
POTASSIUM SERPL-SCNC: 3.9 MMOL/L (ref 3.5–5.1)
PROT UR STRIP.AUTO-MCNC: NEGATIVE MG/DL
RBC #/AREA URNS HPF: ABNORMAL /HPF (ref 0–4)
SODIUM SERPL-SCNC: 129 MMOL/L (ref 136–145)
SP GR UR STRIP.AUTO: 1.02 (ref 1–1.03)
TSH SERPL DL<=0.005 MIU/L-ACNC: 0.99 UIU/ML (ref 0.27–4.2)
UA DIPSTICK W REFLEX MICRO PNL UR: YES
URN SPEC COLLECT METH UR: ABNORMAL
UROBILINOGEN UR STRIP-ACNC: 0.2 E.U./DL
WBC #/AREA URNS HPF: ABNORMAL /HPF (ref 0–5)

## 2024-10-21 PROCEDURE — 93005 ELECTROCARDIOGRAM TRACING: CPT | Performed by: EMERGENCY MEDICINE

## 2024-10-21 PROCEDURE — 87088 URINE BACTERIA CULTURE: CPT

## 2024-10-21 PROCEDURE — 87086 URINE CULTURE/COLONY COUNT: CPT

## 2024-10-21 PROCEDURE — 81001 URINALYSIS AUTO W/SCOPE: CPT

## 2024-10-21 PROCEDURE — 83735 ASSAY OF MAGNESIUM: CPT

## 2024-10-21 PROCEDURE — 6370000000 HC RX 637 (ALT 250 FOR IP): Performed by: EMERGENCY MEDICINE

## 2024-10-21 PROCEDURE — 80048 BASIC METABOLIC PNL TOTAL CA: CPT

## 2024-10-21 PROCEDURE — 87186 SC STD MICRODIL/AGAR DIL: CPT

## 2024-10-21 PROCEDURE — 84443 ASSAY THYROID STIM HORMONE: CPT

## 2024-10-21 PROCEDURE — 84100 ASSAY OF PHOSPHORUS: CPT

## 2024-10-21 RX ORDER — CIPROFLOXACIN 250 MG/1
250 TABLET, FILM COATED ORAL 2 TIMES DAILY
Qty: 14 TABLET | Refills: 0 | Status: SHIPPED | OUTPATIENT
Start: 2024-10-21 | End: 2024-10-28

## 2024-10-21 RX ORDER — CEPHALEXIN 500 MG/1
500 CAPSULE ORAL ONCE
Status: DISCONTINUED | OUTPATIENT
Start: 2024-10-21 | End: 2024-10-21

## 2024-10-21 RX ORDER — CIPROFLOXACIN 500 MG/1
250 TABLET, FILM COATED ORAL ONCE
Status: COMPLETED | OUTPATIENT
Start: 2024-10-21 | End: 2024-10-21

## 2024-10-21 RX ADMIN — CIPROFLOXACIN 250 MG: 500 TABLET, FILM COATED ORAL at 01:39

## 2024-10-21 ASSESSMENT — ENCOUNTER SYMPTOMS
EYES NEGATIVE: 1
GASTROINTESTINAL NEGATIVE: 1
RESPIRATORY NEGATIVE: 1

## 2024-10-21 NOTE — DISCHARGE INSTRUCTIONS
Take antibiotics as prescribed until bottle is empty.  Follow-up with your primary care provider or your nephrologist for recheck of your sodium level since this was slightly decreased from your baseline.  Continue your planned testing for your heart disease and for your parathyroid hormone issues.

## 2024-10-21 NOTE — ED PROVIDER NOTES
THE [facility]  EMERGENCY DEPARTMENT ENCOUNTER          ATTENDING PHYSICIAN NOTE       Date of evaluation: 10/20/2024    Chief Complaint     Hypertension (Pt had 1.5 smoothies, (says she's on a 40oz fluid restriction) felt like she was flush, checked her vitals at home, HR it was 105, bp was normal, smelly urine /HX: parathyroid needs removed, )      History of Present Illness     Jennifer Ulrich is a 64 y.o. female who presents to the emergency department complaining of elevated heart rate, foul-smelling urine, and possible elevated blood pressure.  Patient is a somewhat tangential historian and frequently discusses chronic conditions for which she sees several physicians.  She states normally she is bradycardic but had 1-1/2 smoothies this evening and afterwards started feeling like her face was flushed and her heart rate had become elevated.  She was concerned so came to the emergency department for evaluation.  She denies any fevers or chills.  She denies any chest pain or pressure.  She denies any shortness of breath.  She does note she has had foul-smelling urine this been present for several weeks.  She is a resident report a history of low sodium in the past that was felt to be due to poor diet, but in review of records she also has a history of alcohol abuse.    ASSESSMENT / PLAN  (MEDICAL DECISION MAKING)     INITIAL VITALS: BP: 124/79, Temp: 97.9 °F (36.6 °C), Pulse: 100, Respirations: 14, SpO2: 98 %      Jennifer Ulrich is a 64 y.o. female who presents with multiple complaints.  Her main complaint was her heart rate was elevated more than usual and she felt flushed after eating a smoothie.  She does not know if there was any caffeine or other stimulants within this.  On arrival, patient's heart rate is in the upper 90s.  EKG shows sinus rhythm with no interval abnormalities.  Laboratory studies were significant for sodium of 129.  In review of records her sodium level is typically in the low

## 2024-10-23 LAB
BACTERIA UR CULT: ABNORMAL
ORGANISM: ABNORMAL

## 2024-10-24 NOTE — ED NOTES
[facility]   Emergency Department Culture Follow-Up       Jennifer Ulrich (CSN: [id number]) was seen and evaluated at [facility] Emergency Department on 10/20/24 by Dr. Rakan Sharma.    A urine culture was obtained at time of encounter and is now positive. Urine culture is growing E. coli>100,000 CFU/mL  . Sensitivity results: Pan-sensitive     Results       Procedure Component Value Units Date/Time    Culture, Urine [[id number]]     Order Status: Canceled Specimen: Urine, clean catch     Culture, Urine [[id number]]  (Abnormal)  (Susceptibility) Collected: 10/21/24 0031    Order Status: Completed Specimen: Urine, clean catch Updated: 10/23/24 0531     Organism Escherichia coli     Urine Culture, Routine >100,000 CFU/ml    Narrative:      ORDER#: [id number]                          ORDERED BY: RAKAN SHARMA  SOURCE: Urine Clean Catch                  COLLECTED:  10/21/24 00:31  ANTIBIOTICS AT [facility].:                      RECEIVED :  10/21/24 16:01    Susceptibility        Escherichia coli      BACTERIAL SUSCEPTIBILITY PANEL BY [facility]SON      ampicillin <=2 mcg/mL Sensitive      ampicillin-sulbactam <=2 mcg/mL Sensitive      ceFAZolin <=4 mcg/mL Sensitive  [1]       cefepime <=0.12 mcg/mL Sensitive      cefTRIAXone <=0.25 mcg/mL Sensitive      ciprofloxacin <=0.25 mcg/mL Sensitive      ertapenem <=0.12 mcg/mL Sensitive      gentamicin <=1 mcg/mL Sensitive      levofloxacin <=0.12 mcg/mL Sensitive      nitrofurantoin <=16 mcg/mL Sensitive      piperacillin-tazobactam <=4 mcg/mL Sensitive      trimethoprim-sulfamethoxazole <=20 mcg/mL Sensitive                   [1]  NOTE: Cefazolin should only be used for uncomplicated UTI        for E.coli or Klebsiella pneumoniae.                              Treatment Course    Patient received appropriate empiric antibiotic therapy for organism isolated in culture (ciprofloxacin 250 mg PO BID x 7 days).    Recommendation    It is

## 2025-02-04 ENCOUNTER — APPOINTMENT (OUTPATIENT)
Dept: URBAN - METROPOLITAN AREA CLINIC 170 | Facility: CLINIC | Age: 65
Setting detail: DERMATOLOGY
End: 2025-02-04

## 2025-02-04 DIAGNOSIS — D22 MELANOCYTIC NEVI: ICD-10-CM | Status: STABLE

## 2025-02-04 DIAGNOSIS — L81.4 OTHER MELANIN HYPERPIGMENTATION: ICD-10-CM | Status: STABLE

## 2025-02-04 DIAGNOSIS — L64.8 OTHER ANDROGENIC ALOPECIA: ICD-10-CM | Status: STABLE

## 2025-02-04 DIAGNOSIS — D18.0 HEMANGIOMA: ICD-10-CM | Status: STABLE

## 2025-02-04 DIAGNOSIS — L57.0 ACTINIC KERATOSIS: ICD-10-CM | Status: INADEQUATELY CONTROLLED

## 2025-02-04 DIAGNOSIS — Z85.828 PERSONAL HISTORY OF OTHER MALIGNANT NEOPLASM OF SKIN: ICD-10-CM

## 2025-02-04 DIAGNOSIS — L82.1 OTHER SEBORRHEIC KERATOSIS: ICD-10-CM | Status: STABLE

## 2025-02-04 PROBLEM — D18.01 HEMANGIOMA OF SKIN AND SUBCUTANEOUS TISSUE: Status: ACTIVE | Noted: 2025-02-04

## 2025-02-04 PROBLEM — D22.5 MELANOCYTIC NEVI OF TRUNK: Status: ACTIVE | Noted: 2025-02-04

## 2025-02-04 PROCEDURE — ? EDUCATIONAL RESOURCES PROVIDED

## 2025-02-04 PROCEDURE — ? TREATMENT REGIMEN

## 2025-02-04 PROCEDURE — ? PRESCRIPTION MEDICATION MANAGEMENT

## 2025-02-04 PROCEDURE — ? FULL BODY SKIN EXAM

## 2025-02-04 PROCEDURE — ? DEFER

## 2025-02-04 PROCEDURE — ? COUNSELING

## 2025-02-04 PROCEDURE — ? PRESCRIPTION

## 2025-02-04 PROCEDURE — 99214 OFFICE O/P EST MOD 30 MIN: CPT

## 2025-02-04 PROCEDURE — ? ADDITIONAL NOTES

## 2025-02-04 RX ORDER — FLUOROURACIL 5 MG/G
CREAM TOPICAL
Qty: 30 | Refills: 2 | Status: CANCELLED

## 2025-02-04 RX ORDER — FLUOROURACIL 5 MG/G
CREAM TOPICAL
Qty: 40 | Refills: 2 | Status: ERX | COMMUNITY
Start: 2025-02-04

## 2025-02-04 RX ORDER — TRIAMCINOLONE ACETONIDE 1 MG/G
OINTMENT TOPICAL
Qty: 30 | Refills: 2 | Status: ERX | COMMUNITY
Start: 2025-02-04

## 2025-02-04 RX ORDER — TRIAMCINOLONE ACETONIDE 0.25 MG/G
CREAM TOPICAL
Qty: 80 | Refills: 0 | Status: CANCELLED

## 2025-02-04 RX ADMIN — FLUOROURACIL: 5 CREAM TOPICAL at 00:00

## 2025-02-04 RX ADMIN — TRIAMCINOLONE ACETONIDE: 1 OINTMENT TOPICAL at 00:00

## 2025-02-04 ASSESSMENT — LOCATION ZONE DERM
LOCATION ZONE: TRUNK
LOCATION ZONE: SCALP
LOCATION ZONE: FACE

## 2025-02-04 ASSESSMENT — LOCATION DETAILED DESCRIPTION DERM
LOCATION DETAILED: RIGHT MEDIAL SUPERIOR CHEST
LOCATION DETAILED: RIGHT LATERAL ABDOMEN
LOCATION DETAILED: MID-FRONTAL SCALP
LOCATION DETAILED: LEFT INFERIOR MEDIAL MALAR CHEEK
LOCATION DETAILED: RIGHT MID-UPPER BACK
LOCATION DETAILED: INFERIOR THORACIC SPINE
LOCATION DETAILED: EPIGASTRIC SKIN
LOCATION DETAILED: MEDIAL FRONTAL SCALP

## 2025-02-04 ASSESSMENT — LOCATION SIMPLE DESCRIPTION DERM
LOCATION SIMPLE: FRONTAL SCALP
LOCATION SIMPLE: ANTERIOR SCALP
LOCATION SIMPLE: RIGHT UPPER BACK
LOCATION SIMPLE: LEFT CHEEK
LOCATION SIMPLE: UPPER BACK
LOCATION SIMPLE: ABDOMEN
LOCATION SIMPLE: CHEST

## 2025-02-04 NOTE — HPI: EVALUATION OF SKIN LESION(S)
What Type Of Note Output Would You Prefer (Optional)?: Bullet Format
Hpi Title: Evaluation of Skin Lesions
Additional History: Spot on face from previous BCC site noticed by plastic surgeon.

## 2025-02-04 NOTE — PROCEDURE: ADDITIONAL NOTES
Detail Level: Detailed
Additional Notes: Today AKs noted on nose, lip line and chest. Patient declined LN2, prefers fluorouracil since birthday is tomorrow
Render Risk Assessment In Note?: no

## 2025-02-04 NOTE — PROCEDURE: PRESCRIPTION MEDICATION MANAGEMENT
Detail Level: Detailed
Plan: Discussed oral options: finasteride, minoxidil, spironolactone, patient declined at present time
Render In Strict Bullet Format?: No
Initiate Treatment: Topical minoxidil OTC
Initiate Treatment: Fluorouracil as directed

## 2025-02-04 NOTE — PROCEDURE: DEFER
Introduction Text (Please End With A Colon): The following procedure is being deferred:
Detail Level: Detailed
X Size Of Lesion In Cm (Optional): 0
Instructions (Optional): Pt may schedule for cosmetic shave removal.

## 2025-03-21 ENCOUNTER — APPOINTMENT (OUTPATIENT)
Dept: CT IMAGING | Age: 65
End: 2025-03-21
Payer: MEDICARE

## 2025-03-21 ENCOUNTER — HOSPITAL ENCOUNTER (EMERGENCY)
Age: 65
Discharge: HOME | End: 2025-03-21
Attending: EMERGENCY MEDICINE
Payer: MEDICARE

## 2025-03-21 VITALS
TEMPERATURE: 97.8 F | OXYGEN SATURATION: 98 % | HEIGHT: 70 IN | BODY MASS INDEX: 26.89 KG/M2 | HEART RATE: 85 BPM | DIASTOLIC BLOOD PRESSURE: 74 MMHG | SYSTOLIC BLOOD PRESSURE: 127 MMHG | RESPIRATION RATE: 18 BRPM

## 2025-03-21 DIAGNOSIS — E87.1 HYPONATREMIA: Primary | ICD-10-CM

## 2025-03-21 DIAGNOSIS — F10.920 ACUTE ALCOHOLIC INTOXICATION WITHOUT COMPLICATION: Primary | ICD-10-CM

## 2025-03-21 LAB
ANION GAP SERPL CALCULATED.3IONS-SCNC: 18 MMOL/L (ref 3–16)
BASE EXCESS BLDV CALC-SCNC: 1.3 MMOL/L (ref -2–3)
BASOPHILS # BLD: 0.1 K/UL (ref 0–0.2)
BASOPHILS NFR BLD: 1.2 %
BILIRUB UR QL STRIP.AUTO: NEGATIVE
BUN SERPL-MCNC: 20 MG/DL (ref 7–20)
CALCIUM SERPL-MCNC: 10.3 MG/DL (ref 8.3–10.6)
CHLORIDE SERPL-SCNC: 94 MMOL/L (ref 99–110)
CLARITY UR: CLEAR
CO2 BLDV-SCNC: 26 MMOL/L
CO2 SERPL-SCNC: 23 MMOL/L (ref 21–32)
COHGB MFR BLDV: 1.3 % (ref 0–1.5)
COLOR UR: YELLOW
CREAT SERPL-MCNC: 0.5 MG/DL (ref 0.6–1.2)
DEPRECATED RDW RBC AUTO: 15.2 % (ref 12.4–15.4)
DO-HGB MFR BLDV: 16.8 %
EKG ATRIAL RATE: 76 BPM
EKG DIAGNOSIS: NORMAL
EKG P AXIS: 8 DEGREES
EKG P-R INTERVAL: 176 MS
EKG Q-T INTERVAL: 384 MS
EKG QRS DURATION: 104 MS
EKG QTC CALCULATION (BAZETT): 432 MS
EKG R AXIS: 24 DEGREES
EKG T AXIS: 79 DEGREES
EKG VENTRICULAR RATE: 76 BPM
EOSINOPHIL # BLD: 0 K/UL (ref 0–0.6)
EOSINOPHIL NFR BLD: 0.3 %
ETHANOLAMINE SERPL-MCNC: 145 MG/DL (ref 0–0.08)
GFR SERPLBLD CREATININE-BSD FMLA CKD-EPI: >90 ML/MIN/{1.73_M2}
GLUCOSE SERPL-MCNC: 100 MG/DL (ref 70–99)
GLUCOSE UR STRIP.AUTO-MCNC: NEGATIVE MG/DL
HCO3 BLDV-SCNC: 25.3 MMOL/L (ref 24–28)
HCT VFR BLD AUTO: 36.1 % (ref 36–48)
HGB BLD-MCNC: 12 G/DL (ref 12–16)
HGB UR QL STRIP.AUTO: NEGATIVE
KETONES UR STRIP.AUTO-MCNC: NEGATIVE MG/DL
LEUKOCYTE ESTERASE UR QL STRIP.AUTO: NEGATIVE
LYMPHOCYTES # BLD: 1.9 K/UL (ref 1–5.1)
LYMPHOCYTES NFR BLD: 35 %
MAGNESIUM SERPL-MCNC: 1.84 MG/DL (ref 1.8–2.4)
MCH RBC QN AUTO: 29 PG (ref 26–34)
MCHC RBC AUTO-ENTMCNC: 33.3 G/DL (ref 31–36)
MCV RBC AUTO: 87 FL (ref 80–100)
METHGB MFR BLDV: 0.5 % (ref 0–1.5)
MONOCYTES # BLD: 0.3 K/UL (ref 0–1.3)
MONOCYTES NFR BLD: 6.2 %
NEUTROPHILS # BLD: 3.1 K/UL (ref 1.7–7.7)
NEUTROPHILS NFR BLD: 57.3 %
NITRITE UR QL STRIP.AUTO: NEGATIVE
PCO2 BLDV: 36.9 MMHG (ref 41–51)
PH BLDV: 7.44 [PH] (ref 7.35–7.45)
PH UR STRIP.AUTO: 6 [PH] (ref 5–8)
PLATELET # BLD AUTO: 204 K/UL (ref 135–450)
PMV BLD AUTO: 8.2 FL (ref 5–10.5)
PO2 BLDV: 50.4 MMHG (ref 25–40)
POTASSIUM SERPL-SCNC: 4.1 MMOL/L (ref 3.5–5.1)
PROT UR STRIP.AUTO-MCNC: NEGATIVE MG/DL
RBC # BLD AUTO: 4.15 M/UL (ref 4–5.2)
SAO2 % BLDV: 83 %
SODIUM SERPL-SCNC: 135 MMOL/L (ref 136–145)
SP GR UR STRIP.AUTO: 1.01 (ref 1–1.03)
UA COMPLETE W REFLEX CULTURE PNL UR: NORMAL
UA DIPSTICK W REFLEX MICRO PNL UR: NORMAL
URN SPEC COLLECT METH UR: NORMAL
UROBILINOGEN UR STRIP-ACNC: 0.2 E.U./DL
WBC # BLD AUTO: 5.4 K/UL (ref 4–11)

## 2025-03-21 PROCEDURE — 70450 CT HEAD/BRAIN W/O DYE: CPT

## 2025-03-21 PROCEDURE — 96376 TX/PRO/DX INJ SAME DRUG ADON: CPT

## 2025-03-21 PROCEDURE — 85025 COMPLETE CBC W/AUTO DIFF WBC: CPT

## 2025-03-21 PROCEDURE — 90791 PSYCH DIAGNOSTIC EVALUATION: CPT | Performed by: SOCIAL WORKER

## 2025-03-21 PROCEDURE — 99284 EMERGENCY DEPT VISIT MOD MDM: CPT

## 2025-03-21 PROCEDURE — 81003 URINALYSIS AUTO W/O SCOPE: CPT

## 2025-03-21 PROCEDURE — 96374 THER/PROPH/DIAG INJ IV PUSH: CPT

## 2025-03-21 PROCEDURE — 83735 ASSAY OF MAGNESIUM: CPT

## 2025-03-21 PROCEDURE — 93005 ELECTROCARDIOGRAM TRACING: CPT

## 2025-03-21 PROCEDURE — 6360000002 HC RX W HCPCS

## 2025-03-21 PROCEDURE — 82077 ASSAY SPEC XCP UR&BREATH IA: CPT

## 2025-03-21 PROCEDURE — 80048 BASIC METABOLIC PNL TOTAL CA: CPT

## 2025-03-21 PROCEDURE — 82803 BLOOD GASES ANY COMBINATION: CPT

## 2025-03-21 RX ORDER — NALTREXONE HYDROCHLORIDE 50 MG/1
50 TABLET, FILM COATED ORAL DAILY
Qty: 90 TABLET | Refills: 0 | Status: SHIPPED | OUTPATIENT
Start: 2025-03-21

## 2025-03-21 RX ORDER — POLYETHYLENE GLYCOL 3350 17 G/17G
17 POWDER, FOR SOLUTION ORAL DAILY PRN
Qty: 30 PACKET | Refills: 0 | Status: SHIPPED | OUTPATIENT
Start: 2025-03-21 | End: 2025-04-20

## 2025-03-21 RX ORDER — LORAZEPAM 2 MG/ML
0.5 INJECTION INTRAMUSCULAR ONCE
Status: COMPLETED | OUTPATIENT
Start: 2025-03-21 | End: 2025-03-21

## 2025-03-21 RX ORDER — NALTREXONE HYDROCHLORIDE 50 MG/1
50 TABLET, FILM COATED ORAL DAILY
Qty: 90 TABLET | Refills: 0 | Status: SHIPPED | OUTPATIENT
Start: 2025-03-21 | End: 2025-03-21

## 2025-03-21 RX ORDER — SENNA AND DOCUSATE SODIUM 50; 8.6 MG/1; MG/1
1 TABLET, FILM COATED ORAL DAILY
Qty: 30 TABLET | Refills: 0 | Status: SHIPPED | OUTPATIENT
Start: 2025-03-21

## 2025-03-21 RX ADMIN — LORAZEPAM 0.5 MG: 2 INJECTION INTRAMUSCULAR; INTRAVENOUS at 18:02

## 2025-03-21 RX ADMIN — LORAZEPAM 0.5 MG: 2 INJECTION INTRAMUSCULAR; INTRAVENOUS at 19:18

## 2025-03-21 ASSESSMENT — PAIN - FUNCTIONAL ASSESSMENT: PAIN_FUNCTIONAL_ASSESSMENT: NONE - DENIES PAIN

## 2025-03-21 ASSESSMENT — ENCOUNTER SYMPTOMS
ABDOMINAL PAIN: 0
SHORTNESS OF BREATH: 0

## 2025-03-21 NOTE — DISCHARGE INSTRUCTIONS
You are seen in the emergency room for confusion and disorientation.  You have concern for a low sodium.  Sodium was very close to normal and is not low enough that we would give you any medications to increase it.    You were seen by psychiatry who felt you are safe to go home.  Please follow-up with them and therapy near your home.    Please follow-up with your primary care doctor to discuss your alcohol use and your nephrologist to discuss your overall hyponatremia.    Please come back to the emergency room if you have chest pain, shortness breath, new confusion, new nausea or vomiting to the point you can't tolerate food and fluids

## 2025-03-21 NOTE — ED TRIAGE NOTES
Pt ambulates into the ER from home with complaint of concern of low sodium due to increased shaking. Pt reports increased fluid intake and IV therapy at a hydration clinic. Pt is A&OX4. Respirations are even and unlabored. Skin is warm, appropriate for ethnicity, and dry. No acute distress noted.

## 2025-03-21 NOTE — ED PROVIDER NOTES
physician.Confirmed by MD, ER ([id number]),  ZACHARY VILLEGAS ([id number]) on 3/21/2025 4:28:48 PM     EKG   Interpreted in conjunction with emergencydepartment physician Blaine Dela Cruz MD  See ED Course.    ED BEDSIDE ULTRASOUND:  No results found.    RECENT VITALS:  BP: 127/74, Temp: 97.8 °F (36.6 °C), Pulse: 85,Respirations: 18, SpO2: 98 %     Procedures     None    ED Course     Nursing Notes, Past Medical Hx, Past Surgical Hx, Social Hx, Allergies, and Family Hx were reviewed.    ED Course as of 03/21/25 1935   Fri Mar 21, 2025   1641 EKG 12 Lead  NSR, ventricular rate 76, normal rhythm, no signs of ischemia or infarction, normal axis, no significant prolongation intervals. [CP]   1648 Blood Gas, Venous(!):    pH, Livan 7.444   pCO2, Livan 36.9(!)   pO2, Livan 50.4(!)   Bicarbonate, Venous 25.3   Base Excess, Livan 1.3   O2 Saturation Venous 83   Carboxyhemoglobin 1.3   MetHgb, Livan 0.5   TC02 (Calc), Livan 26   Hemoglobin, Livan, Reduced 16.80  VBG with no significant acid-base derangement [CP]   1649 CBC with Auto Differential:    WBC 5.4   RBC 4.15   Hemoglobin Quant 12.0   Hematocrit 36.1   MCV 87.0   MCH 29.0   MCHC 33.3   RDW 15.2   Platelet Count 204   MPV 8.2   Neutrophils % 57.3   Lymphocyte % 35.0   Monocytes % 6.2   Eosinophils % 0.3   Basophils % 1.2   Neutrophils Absolute 3.1   Lymphocytes Absolute 1.9   Monocytes Absolute 0.3   Eosinophils Absolute 0.0   Basophils Absolute 0.1  CBC with no significant leukocytosis or anemia. [CP]   1651 Urinalysis with Reflex to Culture:    Color, UA Yellow   Clarity, UA Clear   Glucose, Ur Negative   Bilirubin, Urine Negative   Ketones, Urine Negative   Specific Gravity, UA 1.015   Blood, Urine Negative   pH, Urine 6.0   Protein, UA Negative   Urobilinogen 0.2   Nitrite, Urine Negative   Leukocyte Esterase, Urine Negative   Microscopic Examination Not Indicated   Urine Type NotGiven   Urine Reflex to Culture Not Indicated  Negative UA [CP]   1706 BMP w/ Reflex to MG(!):    Sodium  Mental status is at baseline.      Motor: No weakness.      Coordination: Coordination normal.   Psychiatric:      Comments: Patient gives tangential information and seems overall slightly confused but does not seem disoriented.  Still A and O x 4.                 Elian Moise MD  Resident  03/21/25 1935

## 2025-03-21 NOTE — VIRTUAL HEALTH
Jennifer Guyelidia  [id number]  1960     Social Work Behavioral Health Crisis Assessment    25    Chief Complaint: depression     HPI: Patient is a 65 y.o. White (non-) female who presents for Depression . Patient presented to the ED on 25 from home.     Per chart review, past medical history, alcohol abuse, concern for hyponatremia and presentation for altered mental status and disorientation.  Exam and history notable for patient who is alert and oriented but gives a winding history and is a poor historian overall.  Multiple repeated statements about dog that had  3 to 4 weeks ago     Patient reported that she came in because she was concerned that her sodium was too high. Patient reported that her dog  3 weeks ago. Patient reported that the dog slept at the end of her bed. Patient reported that the death of her dog has been very stressful.  Patient reported when the dog was in the hospital, she found out her dog was sicker than she thought and would not live long. Patient reported that she has isra conditions that are effecting her daily life. Patient reported that she has a son that is bipolar, and is going through a stressful time. Patient reported that she feels like her kids are distant. Patient reports that most of her stressors have to do with her feeling isolated. During assessment patient repeats her self multiple times.     reported that the patient \"has not been able to bounce back\".  reported that the patient can't move on from the death of the dog.  reported that the patient keeps repeating her self several times to people.  reported that that they went on vacation to get a distraction but it didn't go well.  reported that the patient now wants a new dog, when he feels like all she needs is to go out and make friends.  reported no concerns with the patient coming home, and reported that they are always together so he can help  keep her safe.     Patient denies anhedonia, feelings of guilt, worthlessness, and hopelessness.  Patient denies hallucinations that are disturbing to him , delusions, paranoia, or ideas of reference.   patient denies homicidal ideations.  Patient denied suicidal ideation Patient does not meet criteria for inpatient admission.   Past Psychiatric History:  Previous Diagnoses/symptoms: Denies  Previous suicide attempts/self-harm: Denies  Inpatient psychiatric hospitalizations: no  Current outpatient psychiatric provider: Denies  Current therapist: States not in therapy  Previous psychiatric medication trials: No prior medication trials  Current psychiatric medications: Listed  Family Psychiatric History: Denies    Sleep Hours: \" haven't been sleeping well\"    Sleep concerns: difficulty attaining sleep    Use of sleep medications: denies    Substance Abuse History:  Tobacco: Denies  Alcohol: Endorses daily   Marijuana: Denies  Stimulant: Denies  Opiates: Denies  Benzodiazepine: Denies  Other illicit drug usage: Denies  History of substance/alcohol abuse treatment: Denies    Social History:  Education: H.S.  Living Situation/Interest: lives with    Marital/Committed relationship and parenting hx:   Occupation: moving  Legal History/Hx of Violence: Denies  Spiritual History: Denies  Psychological trauma, neglect, or abuse: denies hx of trauma/abuse   Access to guns or other weapons: denies having access to firearms/dangerous weapons     Past Medical History:  Active Ambulatory Problems     Diagnosis Date Noted    Alcohol abuse 05/14/2021    Alcohol dependence ([facility]) 02/25/2022    Alcohol withdrawal syndrome with complication ([facility]) 07/07/2022    Hyponatremia 11/26/2022    Tremors of nervous system 11/27/2022    Electrolyte imbalance 11/27/2022    Secondary hypertension 11/27/2022    Weakness of both legs 11/28/2022    Closed nondisplaced avulsion fracture of right talus 06/15/2024    Alcohol withdrawal,

## 2025-03-21 NOTE — ED PROVIDER NOTES
ED Attending Attestation Note     Date of evaluation: 3/21/2025    This patient was seen by the resident.  I have seen and examined the patient, agree with the workup, evaluation, management and diagnosis. The care plan has been discussed.  I have reviewed the ECG and concur with the resident's interpretation. I was present for any procedures performed in the resident's  note and have made edits to the note where appropriate.    My assessment reveals 65 y.o. female with history of alcohol use disorder, hypertension, hyponatremia, multiple other comorbidities presenting for concern for abnormal sodium level and disorientation.  Patient states she has been feeling somewhat out of it and disoriented as well as tremulous.  This began in the setting of a very stressful and sudden loss of her dog 3 weeks ago.  Here she is alert and oriented x 4, has no clinical toxidrome.  Labs are reassuring.  She denies SI or HI but is quite tangential, very fixated on the loss of her dog and appears to likely be having acute adjustment disorder versus developing depression.  Will involve telepsych.        Blaine Dela Cruz MD  03/22/25 [id number]

## (undated) DEVICE — TRAP SPEC RETRV CLR PLAS POLYP IN LN SUCT QUIK CTCH

## (undated) DEVICE — DILATOR ENDOSCP L180CM DIA6FR BLLN L8CM DIA54-60FR

## (undated) DEVICE — SYRINGE INFL 60ML DISP ALLIANCE II

## (undated) DEVICE — FORCEPS BX L240CM JAW DIA2.8MM L CAP W/ NDL MIC MESH TOOTH

## (undated) DEVICE — FORCEPS BX L240CM DIA2.4MM L NDL RAD JAW 4 133340

## (undated) DEVICE — SINGLE-USE POLYPECTOMY SNARE: Brand: CAPTIVATOR